# Patient Record
Sex: FEMALE | Race: WHITE | NOT HISPANIC OR LATINO | Employment: UNEMPLOYED | URBAN - METROPOLITAN AREA
[De-identification: names, ages, dates, MRNs, and addresses within clinical notes are randomized per-mention and may not be internally consistent; named-entity substitution may affect disease eponyms.]

---

## 2017-01-03 ENCOUNTER — TRANSCRIBE ORDERS (OUTPATIENT)
Dept: RADIOLOGY | Facility: HOSPITAL | Age: 82
End: 2017-01-03

## 2017-01-03 ENCOUNTER — HOSPITAL ENCOUNTER (OUTPATIENT)
Dept: RADIOLOGY | Facility: HOSPITAL | Age: 82
Discharge: HOME | End: 2017-01-03
Attending: INTERNAL MEDICINE
Payer: MEDICARE

## 2017-01-03 ENCOUNTER — GENERIC CONVERSION - ENCOUNTER (OUTPATIENT)
Dept: OTHER | Facility: OTHER | Age: 82
End: 2017-01-03

## 2017-01-03 DIAGNOSIS — M25.572 LEFT ANKLE PAIN, UNSPECIFIED CHRONICITY: ICD-10-CM

## 2017-01-03 DIAGNOSIS — M25.572 LEFT ANKLE PAIN, UNSPECIFIED CHRONICITY: Primary | ICD-10-CM

## 2017-01-03 PROCEDURE — 73590 X-RAY EXAM OF LOWER LEG: CPT

## 2017-01-03 PROCEDURE — 73610 X-RAY EXAM OF ANKLE: CPT

## 2017-01-05 ENCOUNTER — ALLSCRIPTS OFFICE VISIT (OUTPATIENT)
Dept: OTHER | Facility: OTHER | Age: 82
End: 2017-01-05

## 2017-01-10 ENCOUNTER — APPOINTMENT (OUTPATIENT)
Dept: SPEECH THERAPY | Facility: CLINIC | Age: 82
End: 2017-01-10
Payer: MEDICARE

## 2017-01-10 PROCEDURE — G9171 VOICE CURRENT STATUS: HCPCS

## 2017-01-10 PROCEDURE — G9172 VOICE GOAL STATUS: HCPCS

## 2017-01-10 PROCEDURE — 92524 BEHAVRAL QUALIT ANALYS VOICE: CPT

## 2017-01-11 ENCOUNTER — GENERIC CONVERSION - ENCOUNTER (OUTPATIENT)
Dept: OTHER | Facility: OTHER | Age: 82
End: 2017-01-11

## 2017-01-16 ENCOUNTER — APPOINTMENT (OUTPATIENT)
Dept: SPEECH THERAPY | Facility: CLINIC | Age: 82
End: 2017-01-16
Payer: MEDICARE

## 2017-01-16 PROCEDURE — 92507 TX SP LANG VOICE COMM INDIV: CPT

## 2017-01-17 ENCOUNTER — APPOINTMENT (OUTPATIENT)
Dept: SPEECH THERAPY | Facility: CLINIC | Age: 82
End: 2017-01-17
Payer: MEDICARE

## 2017-01-17 PROCEDURE — 92507 TX SP LANG VOICE COMM INDIV: CPT

## 2017-01-18 ENCOUNTER — APPOINTMENT (OUTPATIENT)
Dept: SPEECH THERAPY | Facility: CLINIC | Age: 82
End: 2017-01-18
Payer: MEDICARE

## 2017-01-18 PROCEDURE — 92507 TX SP LANG VOICE COMM INDIV: CPT

## 2017-01-19 ENCOUNTER — APPOINTMENT (OUTPATIENT)
Dept: SPEECH THERAPY | Facility: CLINIC | Age: 82
End: 2017-01-19
Payer: MEDICARE

## 2017-01-19 PROCEDURE — 92507 TX SP LANG VOICE COMM INDIV: CPT

## 2017-01-23 ENCOUNTER — APPOINTMENT (OUTPATIENT)
Dept: SPEECH THERAPY | Facility: CLINIC | Age: 82
End: 2017-01-23
Payer: MEDICARE

## 2017-01-24 ENCOUNTER — APPOINTMENT (OUTPATIENT)
Dept: SPEECH THERAPY | Facility: CLINIC | Age: 82
End: 2017-01-24
Payer: MEDICARE

## 2017-01-24 PROCEDURE — 92507 TX SP LANG VOICE COMM INDIV: CPT

## 2017-01-25 ENCOUNTER — APPOINTMENT (OUTPATIENT)
Dept: SPEECH THERAPY | Facility: CLINIC | Age: 82
End: 2017-01-25
Payer: MEDICARE

## 2017-01-25 PROCEDURE — 92507 TX SP LANG VOICE COMM INDIV: CPT

## 2017-01-26 ENCOUNTER — APPOINTMENT (OUTPATIENT)
Dept: SPEECH THERAPY | Facility: CLINIC | Age: 82
End: 2017-01-26
Payer: MEDICARE

## 2017-01-26 PROCEDURE — 92507 TX SP LANG VOICE COMM INDIV: CPT

## 2017-01-27 ENCOUNTER — APPOINTMENT (OUTPATIENT)
Dept: SPEECH THERAPY | Facility: CLINIC | Age: 82
End: 2017-01-27
Payer: MEDICARE

## 2017-01-30 ENCOUNTER — APPOINTMENT (OUTPATIENT)
Dept: SPEECH THERAPY | Facility: CLINIC | Age: 82
End: 2017-01-30
Payer: MEDICARE

## 2017-01-30 PROCEDURE — 92507 TX SP LANG VOICE COMM INDIV: CPT

## 2017-01-31 ENCOUNTER — APPOINTMENT (OUTPATIENT)
Dept: SPEECH THERAPY | Facility: CLINIC | Age: 82
End: 2017-01-31
Payer: MEDICARE

## 2017-01-31 ENCOUNTER — GENERIC CONVERSION - ENCOUNTER (OUTPATIENT)
Dept: OTHER | Facility: OTHER | Age: 82
End: 2017-01-31

## 2017-01-31 PROCEDURE — G9172 VOICE GOAL STATUS: HCPCS

## 2017-01-31 PROCEDURE — 92507 TX SP LANG VOICE COMM INDIV: CPT

## 2017-01-31 PROCEDURE — G9171 VOICE CURRENT STATUS: HCPCS

## 2017-02-01 ENCOUNTER — APPOINTMENT (OUTPATIENT)
Dept: SPEECH THERAPY | Facility: CLINIC | Age: 82
End: 2017-02-01
Payer: MEDICARE

## 2017-02-01 PROCEDURE — 92507 TX SP LANG VOICE COMM INDIV: CPT

## 2017-02-02 ENCOUNTER — APPOINTMENT (OUTPATIENT)
Dept: SPEECH THERAPY | Facility: CLINIC | Age: 82
End: 2017-02-02
Payer: MEDICARE

## 2017-02-02 ENCOUNTER — GENERIC CONVERSION - ENCOUNTER (OUTPATIENT)
Dept: OTHER | Facility: OTHER | Age: 82
End: 2017-02-02

## 2017-02-02 PROCEDURE — 92507 TX SP LANG VOICE COMM INDIV: CPT

## 2017-02-06 ENCOUNTER — APPOINTMENT (OUTPATIENT)
Dept: SPEECH THERAPY | Facility: CLINIC | Age: 82
End: 2017-02-06
Payer: MEDICARE

## 2017-02-06 ENCOUNTER — GENERIC CONVERSION - ENCOUNTER (OUTPATIENT)
Dept: OTHER | Facility: OTHER | Age: 82
End: 2017-02-06

## 2017-02-06 PROCEDURE — 92507 TX SP LANG VOICE COMM INDIV: CPT

## 2017-02-07 ENCOUNTER — APPOINTMENT (OUTPATIENT)
Dept: SPEECH THERAPY | Facility: CLINIC | Age: 82
End: 2017-02-07
Payer: MEDICARE

## 2017-02-07 PROCEDURE — 92507 TX SP LANG VOICE COMM INDIV: CPT

## 2017-02-08 ENCOUNTER — APPOINTMENT (OUTPATIENT)
Dept: SPEECH THERAPY | Facility: CLINIC | Age: 82
End: 2017-02-08
Payer: MEDICARE

## 2017-02-08 PROCEDURE — 92507 TX SP LANG VOICE COMM INDIV: CPT

## 2017-02-09 ENCOUNTER — APPOINTMENT (OUTPATIENT)
Dept: SPEECH THERAPY | Facility: CLINIC | Age: 82
End: 2017-02-09
Payer: MEDICARE

## 2017-02-11 ENCOUNTER — OFFICE VISIT (OUTPATIENT)
Dept: URGENT CARE | Facility: MEDICAL CENTER | Age: 82
End: 2017-02-11
Payer: MEDICARE

## 2017-02-11 ENCOUNTER — APPOINTMENT (OUTPATIENT)
Dept: LAB | Facility: HOSPITAL | Age: 82
End: 2017-02-11
Attending: FAMILY MEDICINE
Payer: MEDICARE

## 2017-02-11 DIAGNOSIS — G20 PARKINSON'S DISEASE (HCC): ICD-10-CM

## 2017-02-11 DIAGNOSIS — J06.9 ACUTE UPPER RESPIRATORY INFECTION: ICD-10-CM

## 2017-02-11 PROCEDURE — 87798 DETECT AGENT NOS DNA AMP: CPT

## 2017-02-11 PROCEDURE — 99204 OFFICE O/P NEW MOD 45 MIN: CPT

## 2017-02-11 PROCEDURE — G0463 HOSPITAL OUTPT CLINIC VISIT: HCPCS

## 2017-02-13 ENCOUNTER — APPOINTMENT (OUTPATIENT)
Dept: SPEECH THERAPY | Facility: CLINIC | Age: 82
End: 2017-02-13
Payer: MEDICARE

## 2017-02-13 LAB
FLUAV AG SPEC QL: NORMAL
FLUBV AG SPEC QL: NORMAL
RSV B RNA SPEC QL NAA+PROBE: NORMAL

## 2017-02-16 ENCOUNTER — APPOINTMENT (OUTPATIENT)
Dept: SPEECH THERAPY | Facility: CLINIC | Age: 82
End: 2017-02-16
Payer: MEDICARE

## 2017-02-22 ENCOUNTER — APPOINTMENT (INPATIENT)
Dept: RADIOLOGY | Facility: HOSPITAL | Age: 82
DRG: 480 | End: 2017-02-22
Payer: MEDICARE

## 2017-02-22 ENCOUNTER — APPOINTMENT (EMERGENCY)
Dept: RADIOLOGY | Facility: HOSPITAL | Age: 82
DRG: 480 | End: 2017-02-22
Payer: MEDICARE

## 2017-02-22 ENCOUNTER — HOSPITAL ENCOUNTER (INPATIENT)
Facility: HOSPITAL | Age: 82
LOS: 12 days | Discharge: NURSING, REHAB OR LONG-TERM CARE | DRG: 480 | End: 2017-03-06
Attending: EMERGENCY MEDICINE | Admitting: ORTHOPAEDIC SURGERY
Payer: MEDICARE

## 2017-02-22 DIAGNOSIS — E16.2 HYPOGLYCEMIA: ICD-10-CM

## 2017-02-22 DIAGNOSIS — F41.8 DEPRESSION WITH ANXIETY: ICD-10-CM

## 2017-02-22 DIAGNOSIS — M51.36 DDD (DEGENERATIVE DISC DISEASE), LUMBAR: ICD-10-CM

## 2017-02-22 DIAGNOSIS — R41.82 ALTERED MENTAL STATUS, UNSPECIFIED ALTERED MENTAL STATUS TYPE: ICD-10-CM

## 2017-02-22 DIAGNOSIS — S72.141A CLOSED INTERTROCHANTERIC FRACTURE OF RIGHT FEMUR, INITIAL ENCOUNTER (HCC): Primary | ICD-10-CM
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
## 2017-02-22 DIAGNOSIS — S72.91XA FEMUR FRACTURE, RIGHT (HCC): ICD-10-CM

## 2017-02-22 DIAGNOSIS — G20 PARKINSON DISEASE (HCC): ICD-10-CM

## 2017-02-22 LAB
ABO GROUP BLD: NORMAL
ANION GAP SERPL CALCULATED.3IONS-SCNC: 3 MMOL/L (ref 4–13)
APTT PPP: 28 SECONDS (ref 24–36)
ATRIAL RATE: 51 BPM
BASOPHILS # BLD AUTO: 0.01 THOUSANDS/ΜL (ref 0–0.1)
BASOPHILS NFR BLD AUTO: 0 % (ref 0–1)
BLD GP AB SCN SERPL QL: NEGATIVE
BUN SERPL-MCNC: 29 MG/DL (ref 5–25)
CALCIUM SERPL-MCNC: 9.1 MG/DL (ref 8.3–10.1)
CHLORIDE SERPL-SCNC: 105 MMOL/L (ref 100–108)
CO2 SERPL-SCNC: 32 MMOL/L (ref 21–32)
CREAT SERPL-MCNC: 0.74 MG/DL (ref 0.6–1.3)
EOSINOPHIL # BLD AUTO: 0.06 THOUSAND/ΜL (ref 0–0.61)
EOSINOPHIL NFR BLD AUTO: 1 % (ref 0–6)
ERYTHROCYTE [DISTWIDTH] IN BLOOD BY AUTOMATED COUNT: 14.4 % (ref 11.6–15.1)
GFR SERPL CREATININE-BSD FRML MDRD: >60 ML/MIN/1.73SQ M
GLUCOSE SERPL-MCNC: 129 MG/DL (ref 65–140)
HCT VFR BLD AUTO: 39.6 % (ref 34.8–46.1)
HGB BLD-MCNC: 13.3 G/DL (ref 11.5–15.4)
INR PPP: 0.98 (ref 0.86–1.16)
LYMPHOCYTES # BLD AUTO: 1.13 THOUSANDS/ΜL (ref 0.6–4.47)
LYMPHOCYTES NFR BLD AUTO: 17 % (ref 14–44)
MCH RBC QN AUTO: 31.6 PG (ref 26.8–34.3)
MCHC RBC AUTO-ENTMCNC: 33.6 G/DL (ref 31.4–37.4)
MCV RBC AUTO: 94 FL (ref 82–98)
MONOCYTES # BLD AUTO: 0.24 THOUSAND/ΜL (ref 0.17–1.22)
MONOCYTES NFR BLD AUTO: 4 % (ref 4–12)
NEUTROPHILS # BLD AUTO: 5.23 THOUSANDS/ΜL (ref 1.85–7.62)
NEUTS SEG NFR BLD AUTO: 78 % (ref 43–75)
NRBC BLD AUTO-RTO: 0 /100 WBCS
P AXIS: 60 DEGREES
PLATELET # BLD AUTO: 163 THOUSANDS/UL (ref 149–390)
PMV BLD AUTO: 10.9 FL (ref 8.9–12.7)
POTASSIUM SERPL-SCNC: 4.1 MMOL/L (ref 3.5–5.3)
PR INTERVAL: 138 MS
PROTHROMBIN TIME: 13.1 SECONDS (ref 12–14.3)
QRS AXIS: 2 DEGREES
QRSD INTERVAL: 158 MS
QT INTERVAL: 486 MS
QTC INTERVAL: 447 MS
RBC # BLD AUTO: 4.21 MILLION/UL (ref 3.81–5.12)
RH BLD: POSITIVE
SODIUM SERPL-SCNC: 140 MMOL/L (ref 136–145)
T WAVE AXIS: 48 DEGREES
VENTRICULAR RATE: 51 BPM
WBC # BLD AUTO: 6.69 THOUSAND/UL (ref 4.31–10.16)

## 2017-02-22 PROCEDURE — 93005 ELECTROCARDIOGRAM TRACING: CPT | Performed by: ORTHOPAEDIC SURGERY

## 2017-02-22 PROCEDURE — 86901 BLOOD TYPING SEROLOGIC RH(D): CPT | Performed by: EMERGENCY MEDICINE

## 2017-02-22 PROCEDURE — 73502 X-RAY EXAM HIP UNI 2-3 VIEWS: CPT

## 2017-02-22 PROCEDURE — 36415 COLL VENOUS BLD VENIPUNCTURE: CPT | Performed by: EMERGENCY MEDICINE

## 2017-02-22 PROCEDURE — 80048 BASIC METABOLIC PNL TOTAL CA: CPT | Performed by: EMERGENCY MEDICINE

## 2017-02-22 PROCEDURE — 86850 RBC ANTIBODY SCREEN: CPT | Performed by: EMERGENCY MEDICINE

## 2017-02-22 PROCEDURE — 93005 ELECTROCARDIOGRAM TRACING: CPT | Performed by: EMERGENCY MEDICINE

## 2017-02-22 PROCEDURE — 73501 X-RAY EXAM HIP UNI 1 VIEW: CPT

## 2017-02-22 PROCEDURE — 85610 PROTHROMBIN TIME: CPT | Performed by: EMERGENCY MEDICINE

## 2017-02-22 PROCEDURE — 73552 X-RAY EXAM OF FEMUR 2/>: CPT

## 2017-02-22 PROCEDURE — 71010 HB CHEST X-RAY 1 VIEW FRONTAL (PORTABLE): CPT

## 2017-02-22 PROCEDURE — 85025 COMPLETE CBC W/AUTO DIFF WBC: CPT | Performed by: EMERGENCY MEDICINE

## 2017-02-22 PROCEDURE — 86900 BLOOD TYPING SEROLOGIC ABO: CPT | Performed by: EMERGENCY MEDICINE

## 2017-02-22 PROCEDURE — 85730 THROMBOPLASTIN TIME PARTIAL: CPT | Performed by: EMERGENCY MEDICINE

## 2017-02-22 RX ORDER — SODIUM CHLORIDE, SODIUM LACTATE, POTASSIUM CHLORIDE, CALCIUM CHLORIDE 600; 310; 30; 20 MG/100ML; MG/100ML; MG/100ML; MG/100ML
75 INJECTION, SOLUTION INTRAVENOUS CONTINUOUS
Status: DISCONTINUED | OUTPATIENT
Start: 2017-02-22 | End: 2017-02-23

## 2017-02-22 RX ORDER — MIRTAZAPINE 15 MG/1
7.5 TABLET, FILM COATED ORAL
Status: DISCONTINUED | OUTPATIENT
Start: 2017-02-23 | End: 2017-03-06

## 2017-02-22 RX ORDER — ENTACAPONE 200 MG/1
200 TABLET ORAL
Status: DISCONTINUED | OUTPATIENT
Start: 2017-02-23 | End: 2017-03-06 | Stop reason: HOSPADM

## 2017-02-22 RX ORDER — OXYCODONE HYDROCHLORIDE 5 MG/1
5 TABLET ORAL EVERY 4 HOURS PRN
Status: DISCONTINUED | OUTPATIENT
Start: 2017-02-22 | End: 2017-02-25

## 2017-02-22 RX ORDER — OXYCODONE HYDROCHLORIDE 10 MG/1
10 TABLET ORAL EVERY 4 HOURS PRN
Status: DISCONTINUED | OUTPATIENT
Start: 2017-02-22 | End: 2017-02-25

## 2017-02-22 RX ORDER — B-COMPLEX WITH VITAMIN C
1 TABLET ORAL DAILY
Status: DISCONTINUED | OUTPATIENT
Start: 2017-02-23 | End: 2017-03-06 | Stop reason: HOSPADM

## 2017-02-22 RX ORDER — MORPHINE SULFATE 2 MG/ML
1 INJECTION, SOLUTION INTRAMUSCULAR; INTRAVENOUS EVERY 4 HOURS PRN
Status: DISCONTINUED | OUTPATIENT
Start: 2017-02-22 | End: 2017-03-01

## 2017-02-22 RX ORDER — DOCUSATE SODIUM 100 MG/1
100 CAPSULE, LIQUID FILLED ORAL 2 TIMES DAILY
Status: DISCONTINUED | OUTPATIENT
Start: 2017-02-22 | End: 2017-03-06 | Stop reason: HOSPADM

## 2017-02-22 RX ORDER — MORPHINE SULFATE 2 MG/ML
INJECTION, SOLUTION INTRAMUSCULAR; INTRAVENOUS
Status: DISPENSED
Start: 2017-02-22 | End: 2017-02-23

## 2017-02-22 RX ORDER — ONDANSETRON 2 MG/ML
4 INJECTION INTRAMUSCULAR; INTRAVENOUS EVERY 6 HOURS PRN
Status: DISCONTINUED | OUTPATIENT
Start: 2017-02-22 | End: 2017-03-06 | Stop reason: HOSPADM

## 2017-02-22 RX ORDER — GLUCOSAMINE SULFATE 500 MG
1000 CAPSULE ORAL DAILY
COMMUNITY

## 2017-02-22 RX ORDER — SODIUM PHOSPHATE,MONO-DIBASIC 19G-7G/118
1000 ENEMA (ML) RECTAL DAILY
Status: DISCONTINUED | OUTPATIENT
Start: 2017-02-23 | End: 2017-03-06 | Stop reason: HOSPADM

## 2017-02-22 RX ORDER — TOLTERODINE 4 MG/1
4 CAPSULE, EXTENDED RELEASE ORAL DAILY
Status: DISCONTINUED | OUTPATIENT
Start: 2017-02-23 | End: 2017-03-06 | Stop reason: HOSPADM

## 2017-02-22 RX ORDER — CHOLECALCIFEROL (VITAMIN D3) 125 MCG
1000 CAPSULE ORAL DAILY
Status: DISCONTINUED | OUTPATIENT
Start: 2017-02-23 | End: 2017-03-06 | Stop reason: HOSPADM

## 2017-02-22 RX ORDER — SENNOSIDES 8.6 MG
1 TABLET ORAL DAILY
Status: DISCONTINUED | OUTPATIENT
Start: 2017-02-23 | End: 2017-03-06 | Stop reason: HOSPADM

## 2017-02-22 RX ORDER — SODIUM CHLORIDE 9 MG/ML
75 INJECTION, SOLUTION INTRAVENOUS CONTINUOUS
Status: DISCONTINUED | OUTPATIENT
Start: 2017-02-22 | End: 2017-02-23

## 2017-02-22 RX ORDER — CARBIDOPA AND LEVODOPA 50; 200 MG/1; MG/1
1 TABLET, EXTENDED RELEASE ORAL
Status: DISCONTINUED | OUTPATIENT
Start: 2017-02-23 | End: 2017-02-25

## 2017-02-22 RX ADMIN — SODIUM CHLORIDE, SODIUM LACTATE, POTASSIUM CHLORIDE, AND CALCIUM CHLORIDE 75 ML/HR: .6; .31; .03; .02 INJECTION, SOLUTION INTRAVENOUS at 21:58

## 2017-02-22 RX ADMIN — MORPHINE SULFATE 1 MG: 2 INJECTION, SOLUTION INTRAMUSCULAR; INTRAVENOUS at 22:34

## 2017-02-23 ENCOUNTER — APPOINTMENT (INPATIENT)
Dept: RADIOLOGY | Facility: HOSPITAL | Age: 82
DRG: 480 | End: 2017-02-23
Payer: MEDICARE

## 2017-02-23 ENCOUNTER — ANESTHESIA (INPATIENT)
Dept: PERIOP | Facility: HOSPITAL | Age: 82
DRG: 480 | End: 2017-02-23
Payer: MEDICARE

## 2017-02-23 ENCOUNTER — ANESTHESIA EVENT (INPATIENT)
Dept: PERIOP | Facility: HOSPITAL | Age: 82
DRG: 480 | End: 2017-02-23
Payer: MEDICARE

## 2017-02-23 LAB
ALBUMIN SERPL BCP-MCNC: 3.4 G/DL (ref 3.5–5)
ALP SERPL-CCNC: 75 U/L (ref 46–116)
ALT SERPL W P-5'-P-CCNC: 9 U/L (ref 12–78)
ANION GAP SERPL CALCULATED.3IONS-SCNC: 4 MMOL/L (ref 4–13)
APTT PPP: 25 SECONDS (ref 24–36)
AST SERPL W P-5'-P-CCNC: 21 U/L (ref 5–45)
BILIRUB SERPL-MCNC: 0.58 MG/DL (ref 0.2–1)
BUN SERPL-MCNC: 25 MG/DL (ref 5–25)
CALCIUM SERPL-MCNC: 8.7 MG/DL (ref 8.3–10.1)
CHLORIDE SERPL-SCNC: 105 MMOL/L (ref 100–108)
CO2 SERPL-SCNC: 30 MMOL/L (ref 21–32)
CREAT SERPL-MCNC: 0.66 MG/DL (ref 0.6–1.3)
ERYTHROCYTE [DISTWIDTH] IN BLOOD BY AUTOMATED COUNT: 14.1 % (ref 11.6–15.1)
GFR SERPL CREATININE-BSD FRML MDRD: >60 ML/MIN/1.73SQ M
GLUCOSE SERPL-MCNC: 107 MG/DL (ref 65–140)
HCT VFR BLD AUTO: 34.7 % (ref 34.8–46.1)
HGB BLD-MCNC: 11.6 G/DL (ref 11.5–15.4)
INR PPP: 1.05 (ref 0.86–1.16)
MCH RBC QN AUTO: 31.3 PG (ref 26.8–34.3)
MCHC RBC AUTO-ENTMCNC: 33.4 G/DL (ref 31.4–37.4)
MCV RBC AUTO: 94 FL (ref 82–98)
PLATELET # BLD AUTO: 163 THOUSANDS/UL (ref 149–390)
PMV BLD AUTO: 11 FL (ref 8.9–12.7)
POTASSIUM SERPL-SCNC: 3.8 MMOL/L (ref 3.5–5.3)
PROT SERPL-MCNC: 6.4 G/DL (ref 6.4–8.2)
PROTHROMBIN TIME: 13.8 SECONDS (ref 12–14.3)
RBC # BLD AUTO: 3.71 MILLION/UL (ref 3.81–5.12)
SODIUM SERPL-SCNC: 139 MMOL/L (ref 136–145)
WBC # BLD AUTO: 8.26 THOUSAND/UL (ref 4.31–10.16)

## 2017-02-23 PROCEDURE — 85610 PROTHROMBIN TIME: CPT | Performed by: ORTHOPAEDIC SURGERY

## 2017-02-23 PROCEDURE — 99285 EMERGENCY DEPT VISIT HI MDM: CPT

## 2017-02-23 PROCEDURE — C1769 GUIDE WIRE: HCPCS | Performed by: ORTHOPAEDIC SURGERY

## 2017-02-23 PROCEDURE — C1713 ANCHOR/SCREW BN/BN,TIS/BN: HCPCS | Performed by: ORTHOPAEDIC SURGERY

## 2017-02-23 PROCEDURE — 36415 COLL VENOUS BLD VENIPUNCTURE: CPT | Performed by: ORTHOPAEDIC SURGERY

## 2017-02-23 PROCEDURE — 85730 THROMBOPLASTIN TIME PARTIAL: CPT | Performed by: ORTHOPAEDIC SURGERY

## 2017-02-23 PROCEDURE — 73502 X-RAY EXAM HIP UNI 2-3 VIEWS: CPT

## 2017-02-23 PROCEDURE — 0QH606Z INSERTION OF INTRAMEDULLARY INTERNAL FIXATION DEVICE INTO RIGHT UPPER FEMUR, OPEN APPROACH: ICD-10-PCS | Performed by: ORTHOPAEDIC SURGERY

## 2017-02-23 PROCEDURE — 80053 COMPREHEN METABOLIC PANEL: CPT | Performed by: ORTHOPAEDIC SURGERY

## 2017-02-23 PROCEDURE — 85027 COMPLETE CBC AUTOMATED: CPT | Performed by: ORTHOPAEDIC SURGERY

## 2017-02-23 DEVICE — 10MM/130 DEG TI CANN TROCH FIXATION NAIL 170MM-STERILE: Type: IMPLANTABLE DEVICE | Site: HIP | Status: FUNCTIONAL

## 2017-02-23 DEVICE — 11.0MM TI HELICAL BLADE 100MM-STERILE: Type: IMPLANTABLE DEVICE | Site: HIP | Status: FUNCTIONAL

## 2017-02-23 RX ORDER — ONDANSETRON 2 MG/ML
4 INJECTION INTRAMUSCULAR; INTRAVENOUS EVERY 4 HOURS PRN
Status: DISCONTINUED | OUTPATIENT
Start: 2017-02-23 | End: 2017-02-23 | Stop reason: HOSPADM

## 2017-02-23 RX ORDER — CLINDAMYCIN PHOSPHATE 900 MG/50ML
900 INJECTION INTRAVENOUS ONCE
Status: DISCONTINUED | OUTPATIENT
Start: 2017-02-23 | End: 2017-02-23 | Stop reason: HOSPADM

## 2017-02-23 RX ORDER — FENTANYL CITRATE 50 UG/ML
INJECTION, SOLUTION INTRAMUSCULAR; INTRAVENOUS AS NEEDED
Status: DISCONTINUED | OUTPATIENT
Start: 2017-02-23 | End: 2017-02-23 | Stop reason: SURG

## 2017-02-23 RX ORDER — SODIUM CHLORIDE, SODIUM LACTATE, POTASSIUM CHLORIDE, CALCIUM CHLORIDE 600; 310; 30; 20 MG/100ML; MG/100ML; MG/100ML; MG/100ML
100 INJECTION, SOLUTION INTRAVENOUS CONTINUOUS
Status: DISCONTINUED | OUTPATIENT
Start: 2017-02-23 | End: 2017-02-23

## 2017-02-23 RX ORDER — SODIUM CHLORIDE 9 MG/ML
75 INJECTION, SOLUTION INTRAVENOUS CONTINUOUS
Status: DISCONTINUED | OUTPATIENT
Start: 2017-02-23 | End: 2017-03-02

## 2017-02-23 RX ORDER — ONDANSETRON 2 MG/ML
INJECTION INTRAMUSCULAR; INTRAVENOUS AS NEEDED
Status: DISCONTINUED | OUTPATIENT
Start: 2017-02-23 | End: 2017-02-23 | Stop reason: SURG

## 2017-02-23 RX ORDER — MAGNESIUM HYDROXIDE 1200 MG/15ML
LIQUID ORAL AS NEEDED
Status: DISCONTINUED | OUTPATIENT
Start: 2017-02-23 | End: 2017-02-23 | Stop reason: HOSPADM

## 2017-02-23 RX ORDER — PROPOFOL 10 MG/ML
INJECTION, EMULSION INTRAVENOUS CONTINUOUS PRN
Status: DISCONTINUED | OUTPATIENT
Start: 2017-02-23 | End: 2017-02-23 | Stop reason: SURG

## 2017-02-23 RX ORDER — CLINDAMYCIN PHOSPHATE 150 MG/ML
INJECTION, SOLUTION INTRAVENOUS AS NEEDED
Status: DISCONTINUED | OUTPATIENT
Start: 2017-02-23 | End: 2017-02-23 | Stop reason: SURG

## 2017-02-23 RX ORDER — EPHEDRINE SULFATE 50 MG/ML
INJECTION, SOLUTION INTRAVENOUS AS NEEDED
Status: DISCONTINUED | OUTPATIENT
Start: 2017-02-23 | End: 2017-02-23 | Stop reason: SURG

## 2017-02-23 RX ORDER — CLINDAMYCIN PHOSPHATE 600 MG/50ML
600 INJECTION INTRAVENOUS EVERY 8 HOURS
Status: COMPLETED | OUTPATIENT
Start: 2017-02-23 | End: 2017-02-24

## 2017-02-23 RX ORDER — PROPOFOL 10 MG/ML
INJECTION, EMULSION INTRAVENOUS AS NEEDED
Status: DISCONTINUED | OUTPATIENT
Start: 2017-02-23 | End: 2017-02-23 | Stop reason: SURG

## 2017-02-23 RX ADMIN — PROPOFOL 100 MCG/KG/MIN: 10 INJECTION, EMULSION INTRAVENOUS at 12:48

## 2017-02-23 RX ADMIN — DEXAMETHASONE SODIUM PHOSPHATE 10 MG: 10 INJECTION INTRAMUSCULAR; INTRAVENOUS at 12:49

## 2017-02-23 RX ADMIN — CARBIDOPA AND LEVODOPA 1 TABLET: 50; 200 TABLET, EXTENDED RELEASE ORAL at 19:26

## 2017-02-23 RX ADMIN — OXYCODONE HYDROCHLORIDE 10 MG: 10 TABLET ORAL at 21:30

## 2017-02-23 RX ADMIN — TOLTERODINE TARTRATE 4 MG: 4 CAPSULE, EXTENDED RELEASE ORAL at 22:09

## 2017-02-23 RX ADMIN — FENTANYL CITRATE 25 MCG: 50 INJECTION, SOLUTION INTRAMUSCULAR; INTRAVENOUS at 13:08

## 2017-02-23 RX ADMIN — SODIUM CHLORIDE 75 ML/HR: 0.9 INJECTION, SOLUTION INTRAVENOUS at 15:00

## 2017-02-23 RX ADMIN — PROPOFOL 100 MG: 10 INJECTION, EMULSION INTRAVENOUS at 12:47

## 2017-02-23 RX ADMIN — CLINDAMYCIN PHOSPHATE 900 MG: 150 INJECTION, SOLUTION INTRAMUSCULAR; INTRAVENOUS at 12:55

## 2017-02-23 RX ADMIN — EPHEDRINE SULFATE 15 MG: 50 INJECTION, SOLUTION INTRAMUSCULAR; INTRAVENOUS; SUBCUTANEOUS at 12:50

## 2017-02-23 RX ADMIN — DOCUSATE SODIUM 100 MG: 100 CAPSULE, LIQUID FILLED ORAL at 01:54

## 2017-02-23 RX ADMIN — CARBIDOPA AND LEVODOPA 1 TABLET: 50; 200 TABLET, EXTENDED RELEASE ORAL at 22:10

## 2017-02-23 RX ADMIN — ONDANSETRON 4 MG: 2 INJECTION INTRAMUSCULAR; INTRAVENOUS at 13:35

## 2017-02-23 RX ADMIN — ENTACAPONE 200 MG: 200 TABLET, FILM COATED ORAL at 09:21

## 2017-02-23 RX ADMIN — CARBIDOPA AND LEVODOPA 1 TABLET: 50; 200 TABLET, EXTENDED RELEASE ORAL at 09:21

## 2017-02-23 RX ADMIN — SODIUM CHLORIDE, SODIUM LACTATE, POTASSIUM CHLORIDE, AND CALCIUM CHLORIDE: .6; .31; .03; .02 INJECTION, SOLUTION INTRAVENOUS at 12:39

## 2017-02-23 RX ADMIN — EPHEDRINE SULFATE 5 MG: 50 INJECTION, SOLUTION INTRAMUSCULAR; INTRAVENOUS; SUBCUTANEOUS at 13:43

## 2017-02-23 RX ADMIN — OXYCODONE HYDROCHLORIDE 10 MG: 10 TABLET ORAL at 01:54

## 2017-02-23 RX ADMIN — EPHEDRINE SULFATE 5 MG: 50 INJECTION, SOLUTION INTRAMUSCULAR; INTRAVENOUS; SUBCUTANEOUS at 13:05

## 2017-02-23 RX ADMIN — ENTACAPONE 200 MG: 200 TABLET, FILM COATED ORAL at 22:09

## 2017-02-23 RX ADMIN — MIRTAZAPINE 7.5 MG: 15 TABLET, FILM COATED ORAL at 22:09

## 2017-02-23 RX ADMIN — OXYCODONE HYDROCHLORIDE 10 MG: 10 TABLET ORAL at 16:42

## 2017-02-23 RX ADMIN — DOCUSATE SODIUM 100 MG: 100 CAPSULE, LIQUID FILLED ORAL at 19:25

## 2017-02-23 RX ADMIN — CALCIUM CARBONATE 500 MG (1,250 MG)-VITAMIN D3 200 UNIT TABLET 1 TABLET: at 19:26

## 2017-02-23 RX ADMIN — ENTACAPONE 200 MG: 200 TABLET, FILM COATED ORAL at 19:25

## 2017-02-23 RX ADMIN — CLINDAMYCIN PHOSPHATE 600 MG: 12 INJECTION, SOLUTION INTRAMUSCULAR; INTRAVENOUS at 22:08

## 2017-02-23 RX ADMIN — EPHEDRINE SULFATE 10 MG: 50 INJECTION, SOLUTION INTRAMUSCULAR; INTRAVENOUS; SUBCUTANEOUS at 13:20

## 2017-02-24 LAB
ANION GAP SERPL CALCULATED.3IONS-SCNC: 6 MMOL/L (ref 4–13)
BUN SERPL-MCNC: 29 MG/DL (ref 5–25)
CALCIUM SERPL-MCNC: 8.7 MG/DL (ref 8.3–10.1)
CHLORIDE SERPL-SCNC: 104 MMOL/L (ref 100–108)
CO2 SERPL-SCNC: 27 MMOL/L (ref 21–32)
CREAT SERPL-MCNC: 0.86 MG/DL (ref 0.6–1.3)
ERYTHROCYTE [DISTWIDTH] IN BLOOD BY AUTOMATED COUNT: 14.5 % (ref 11.6–15.1)
GFR SERPL CREATININE-BSD FRML MDRD: >60 ML/MIN/1.73SQ M
GLUCOSE SERPL-MCNC: 119 MG/DL (ref 65–140)
HCT VFR BLD AUTO: 27.5 % (ref 34.8–46.1)
HGB BLD-MCNC: 9.5 G/DL (ref 11.5–15.4)
MCH RBC QN AUTO: 32.1 PG (ref 26.8–34.3)
MCHC RBC AUTO-ENTMCNC: 34.5 G/DL (ref 31.4–37.4)
MCV RBC AUTO: 93 FL (ref 82–98)
PLATELET # BLD AUTO: 132 THOUSANDS/UL (ref 149–390)
PMV BLD AUTO: 10.8 FL (ref 8.9–12.7)
POTASSIUM SERPL-SCNC: 4.3 MMOL/L (ref 3.5–5.3)
RBC # BLD AUTO: 2.96 MILLION/UL (ref 3.81–5.12)
SODIUM SERPL-SCNC: 137 MMOL/L (ref 136–145)
WBC # BLD AUTO: 7.11 THOUSAND/UL (ref 4.31–10.16)

## 2017-02-24 PROCEDURE — 85027 COMPLETE CBC AUTOMATED: CPT | Performed by: ORTHOPAEDIC SURGERY

## 2017-02-24 PROCEDURE — 97167 OT EVAL HIGH COMPLEX 60 MIN: CPT

## 2017-02-24 PROCEDURE — G8979 MOBILITY GOAL STATUS: HCPCS

## 2017-02-24 PROCEDURE — 80048 BASIC METABOLIC PNL TOTAL CA: CPT | Performed by: ORTHOPAEDIC SURGERY

## 2017-02-24 PROCEDURE — G8987 SELF CARE CURRENT STATUS: HCPCS

## 2017-02-24 PROCEDURE — 97163 PT EVAL HIGH COMPLEX 45 MIN: CPT

## 2017-02-24 PROCEDURE — G8988 SELF CARE GOAL STATUS: HCPCS

## 2017-02-24 PROCEDURE — G8978 MOBILITY CURRENT STATUS: HCPCS

## 2017-02-24 RX ADMIN — CARBIDOPA AND LEVODOPA 1 TABLET: 50; 200 TABLET, EXTENDED RELEASE ORAL at 18:35

## 2017-02-24 RX ADMIN — ENOXAPARIN SODIUM 40 MG: 40 INJECTION SUBCUTANEOUS at 08:26

## 2017-02-24 RX ADMIN — ENTACAPONE 200 MG: 200 TABLET, FILM COATED ORAL at 11:17

## 2017-02-24 RX ADMIN — CARBIDOPA AND LEVODOPA 1 TABLET: 50; 200 TABLET, EXTENDED RELEASE ORAL at 22:00

## 2017-02-24 RX ADMIN — CARBIDOPA AND LEVODOPA 1 TABLET: 50; 200 TABLET, EXTENDED RELEASE ORAL at 13:09

## 2017-02-24 RX ADMIN — Medication 1 TABLET: at 08:26

## 2017-02-24 RX ADMIN — CLINDAMYCIN PHOSPHATE 600 MG: 12 INJECTION, SOLUTION INTRAMUSCULAR; INTRAVENOUS at 04:57

## 2017-02-24 RX ADMIN — CARBIDOPA AND LEVODOPA 1 TABLET: 50; 200 TABLET, EXTENDED RELEASE ORAL at 10:11

## 2017-02-24 RX ADMIN — SODIUM CHLORIDE 75 ML/HR: 0.9 INJECTION, SOLUTION INTRAVENOUS at 06:37

## 2017-02-24 RX ADMIN — MIRTAZAPINE 7.5 MG: 15 TABLET, FILM COATED ORAL at 21:59

## 2017-02-24 RX ADMIN — DOCUSATE SODIUM 100 MG: 100 CAPSULE, LIQUID FILLED ORAL at 18:35

## 2017-02-24 RX ADMIN — ENTACAPONE 200 MG: 200 TABLET, FILM COATED ORAL at 22:00

## 2017-02-24 RX ADMIN — ENTACAPONE 200 MG: 200 TABLET, FILM COATED ORAL at 18:35

## 2017-02-24 RX ADMIN — SENNOSIDES 8.6 MG: 8.6 TABLET, FILM COATED ORAL at 08:26

## 2017-02-24 RX ADMIN — DOCUSATE SODIUM 100 MG: 100 CAPSULE, LIQUID FILLED ORAL at 08:26

## 2017-02-24 RX ADMIN — CARBIDOPA AND LEVODOPA 1 TABLET: 50; 200 TABLET, EXTENDED RELEASE ORAL at 05:00

## 2017-02-24 RX ADMIN — Medication 1000 MG: at 08:26

## 2017-02-24 RX ADMIN — TOLTERODINE TARTRATE 4 MG: 4 CAPSULE, EXTENDED RELEASE ORAL at 08:26

## 2017-02-24 RX ADMIN — CYANOCOBALAMIN TAB 500 MCG 1000 MCG: 500 TAB at 08:26

## 2017-02-24 RX ADMIN — CALCIUM CARBONATE 500 MG (1,250 MG)-VITAMIN D3 200 UNIT TABLET 1 TABLET: at 08:26

## 2017-02-24 RX ADMIN — ENTACAPONE 200 MG: 200 TABLET, FILM COATED ORAL at 13:09

## 2017-02-24 RX ADMIN — ENTACAPONE 200 MG: 200 TABLET, FILM COATED ORAL at 05:00

## 2017-02-25 ENCOUNTER — APPOINTMENT (INPATIENT)
Dept: RADIOLOGY | Facility: HOSPITAL | Age: 82
DRG: 480 | End: 2017-02-25
Payer: MEDICARE

## 2017-02-25 LAB
BACTERIA UR QL AUTO: ABNORMAL /HPF
BILIRUB UR QL STRIP: ABNORMAL
CLARITY UR: ABNORMAL
COLOR UR: ABNORMAL
GLUCOSE UR STRIP-MCNC: NEGATIVE MG/DL
HGB UR QL STRIP.AUTO: NEGATIVE
KETONES UR STRIP-MCNC: ABNORMAL MG/DL
LEUKOCYTE ESTERASE UR QL STRIP: ABNORMAL
NITRITE UR QL STRIP: POSITIVE
NON-SQ EPI CELLS URNS QL MICRO: ABNORMAL /HPF
PH UR STRIP.AUTO: 8 [PH] (ref 4.5–8)
PROT UR STRIP-MCNC: ABNORMAL MG/DL
RBC #/AREA URNS AUTO: ABNORMAL /HPF
SP GR UR STRIP.AUTO: 1.02 (ref 1–1.03)
TRI-PHOS CRY URNS QL MICRO: ABNORMAL /HPF
UROBILINOGEN UR QL STRIP.AUTO: 0.2 E.U./DL
WBC #/AREA URNS AUTO: ABNORMAL /HPF

## 2017-02-25 PROCEDURE — 97530 THERAPEUTIC ACTIVITIES: CPT

## 2017-02-25 PROCEDURE — 97535 SELF CARE MNGMENT TRAINING: CPT

## 2017-02-25 PROCEDURE — 81001 URINALYSIS AUTO W/SCOPE: CPT | Performed by: PSYCHIATRY & NEUROLOGY

## 2017-02-25 PROCEDURE — 70450 CT HEAD/BRAIN W/O DYE: CPT

## 2017-02-25 RX ORDER — ACETAMINOPHEN 325 MG/1
650 TABLET ORAL EVERY 6 HOURS PRN
Status: DISCONTINUED | OUTPATIENT
Start: 2017-02-25 | End: 2017-03-06 | Stop reason: HOSPADM

## 2017-02-25 RX ADMIN — CYANOCOBALAMIN TAB 500 MCG 1000 MCG: 500 TAB at 08:51

## 2017-02-25 RX ADMIN — SENNOSIDES 8.6 MG: 8.6 TABLET, FILM COATED ORAL at 08:52

## 2017-02-25 RX ADMIN — CARBIDOPA AND LEVODOPA 1 TABLET: 50; 200 TABLET, EXTENDED RELEASE ORAL at 06:19

## 2017-02-25 RX ADMIN — ENOXAPARIN SODIUM 40 MG: 40 INJECTION SUBCUTANEOUS at 08:50

## 2017-02-25 RX ADMIN — MIRTAZAPINE 7.5 MG: 15 TABLET, FILM COATED ORAL at 22:04

## 2017-02-25 RX ADMIN — CARBIDOPA AND LEVODOPA 1 TABLET: 50; 200 TABLET, EXTENDED RELEASE ORAL at 09:03

## 2017-02-25 RX ADMIN — CARBIDOPA AND LEVODOPA 2 TABLET: 25; 100 TABLET ORAL at 17:15

## 2017-02-25 RX ADMIN — DOCUSATE SODIUM 100 MG: 100 CAPSULE, LIQUID FILLED ORAL at 08:53

## 2017-02-25 RX ADMIN — Medication 1 TABLET: at 08:51

## 2017-02-25 RX ADMIN — ENTACAPONE 200 MG: 200 TABLET, FILM COATED ORAL at 17:15

## 2017-02-25 RX ADMIN — CARBIDOPA AND LEVODOPA 2 TABLET: 25; 100 TABLET ORAL at 22:04

## 2017-02-25 RX ADMIN — ENTACAPONE 200 MG: 200 TABLET, FILM COATED ORAL at 22:05

## 2017-02-25 RX ADMIN — ENTACAPONE 200 MG: 200 TABLET, FILM COATED ORAL at 09:03

## 2017-02-25 RX ADMIN — CARBIDOPA AND LEVODOPA 1 TABLET: 50; 200 TABLET, EXTENDED RELEASE ORAL at 14:09

## 2017-02-25 RX ADMIN — DOCUSATE SODIUM 100 MG: 100 CAPSULE, LIQUID FILLED ORAL at 17:16

## 2017-02-25 RX ADMIN — ENTACAPONE 200 MG: 200 TABLET, FILM COATED ORAL at 06:19

## 2017-02-25 RX ADMIN — CALCIUM CARBONATE 500 MG (1,250 MG)-VITAMIN D3 200 UNIT TABLET 1 TABLET: at 08:52

## 2017-02-25 RX ADMIN — Medication 1000 MG: at 08:53

## 2017-02-25 RX ADMIN — ENTACAPONE 200 MG: 200 TABLET, FILM COATED ORAL at 14:09

## 2017-02-25 RX ADMIN — TOLTERODINE TARTRATE 4 MG: 4 CAPSULE, EXTENDED RELEASE ORAL at 08:54

## 2017-02-26 LAB
ANION GAP SERPL CALCULATED.3IONS-SCNC: 5 MMOL/L (ref 4–13)
ATRIAL RATE: 63 BPM
BACTERIA UR QL AUTO: ABNORMAL /HPF
BASOPHILS # BLD MANUAL: 0 THOUSAND/UL (ref 0–0.1)
BASOPHILS NFR MAR MANUAL: 0 % (ref 0–1)
BILIRUB UR QL STRIP: NEGATIVE
BUN SERPL-MCNC: 22 MG/DL (ref 5–25)
CALCIUM SERPL-MCNC: 8.5 MG/DL (ref 8.3–10.1)
CHLORIDE SERPL-SCNC: 103 MMOL/L (ref 100–108)
CLARITY UR: ABNORMAL
CO2 SERPL-SCNC: 30 MMOL/L (ref 21–32)
COLOR UR: ABNORMAL
CREAT SERPL-MCNC: 0.66 MG/DL (ref 0.6–1.3)
EOSINOPHIL # BLD MANUAL: 0 THOUSAND/UL (ref 0–0.4)
EOSINOPHIL NFR BLD MANUAL: 0 % (ref 0–6)
ERYTHROCYTE [DISTWIDTH] IN BLOOD BY AUTOMATED COUNT: 14.3 % (ref 11.6–15.1)
GFR SERPL CREATININE-BSD FRML MDRD: >60 ML/MIN/1.73SQ M
GLUCOSE SERPL-MCNC: 131 MG/DL (ref 65–140)
GLUCOSE SERPL-MCNC: 45 MG/DL (ref 65–140)
GLUCOSE SERPL-MCNC: 50 MG/DL (ref 65–140)
GLUCOSE SERPL-MCNC: 60 MG/DL (ref 65–140)
GLUCOSE SERPL-MCNC: 74 MG/DL (ref 65–140)
GLUCOSE SERPL-MCNC: 99 MG/DL (ref 65–140)
GLUCOSE UR STRIP-MCNC: NEGATIVE MG/DL
HCT VFR BLD AUTO: 26.9 % (ref 34.8–46.1)
HGB BLD-MCNC: 9.2 G/DL (ref 11.5–15.4)
HGB UR QL STRIP.AUTO: ABNORMAL
HYALINE CASTS #/AREA URNS LPF: ABNORMAL /LPF
KETONES UR STRIP-MCNC: ABNORMAL MG/DL
LEUKOCYTE ESTERASE UR QL STRIP: ABNORMAL
LYMPHOCYTES # BLD AUTO: 0.69 THOUSAND/UL (ref 0.6–4.47)
LYMPHOCYTES # BLD AUTO: 10 % (ref 14–44)
MCH RBC QN AUTO: 32.2 PG (ref 26.8–34.3)
MCHC RBC AUTO-ENTMCNC: 34.2 G/DL (ref 31.4–37.4)
MCV RBC AUTO: 94 FL (ref 82–98)
MONOCYTES # BLD AUTO: 0.21 THOUSAND/UL (ref 0–1.22)
MONOCYTES NFR BLD: 3 % (ref 4–12)
NEUTROPHILS # BLD MANUAL: 5.72 THOUSAND/UL (ref 1.85–7.62)
NEUTS SEG NFR BLD AUTO: 83 % (ref 43–75)
NITRITE UR QL STRIP: NEGATIVE
NON-SQ EPI CELLS URNS QL MICRO: ABNORMAL /HPF
NRBC BLD AUTO-RTO: 0 /100 WBCS
P AXIS: 27 DEGREES
PH UR STRIP.AUTO: 7.5 [PH] (ref 4.5–8)
PLATELET # BLD AUTO: 139 THOUSANDS/UL (ref 149–390)
PLATELET BLD QL SMEAR: ADEQUATE
PMV BLD AUTO: 10.6 FL (ref 8.9–12.7)
POTASSIUM SERPL-SCNC: 4 MMOL/L (ref 3.5–5.3)
PR INTERVAL: 134 MS
PROT UR STRIP-MCNC: ABNORMAL MG/DL
QRS AXIS: 44 DEGREES
QRSD INTERVAL: 150 MS
QT INTERVAL: 452 MS
QTC INTERVAL: 462 MS
RBC # BLD AUTO: 2.86 MILLION/UL (ref 3.81–5.12)
RBC #/AREA URNS AUTO: ABNORMAL /HPF
RBC MORPH BLD: NORMAL
SODIUM SERPL-SCNC: 138 MMOL/L (ref 136–145)
SP GR UR STRIP.AUTO: 1.02 (ref 1–1.03)
T WAVE AXIS: 6 DEGREES
TSH SERPL DL<=0.05 MIU/L-ACNC: 3.27 UIU/ML (ref 0.36–3.74)
UROBILINOGEN UR QL STRIP.AUTO: 0.2 E.U./DL
VARIANT LYMPHS # BLD AUTO: 4 %
VENTRICULAR RATE: 63 BPM
WBC # BLD AUTO: 6.89 THOUSAND/UL (ref 4.31–10.16)
WBC #/AREA URNS AUTO: ABNORMAL /HPF

## 2017-02-26 PROCEDURE — 97535 SELF CARE MNGMENT TRAINING: CPT

## 2017-02-26 PROCEDURE — 87086 URINE CULTURE/COLONY COUNT: CPT | Performed by: INTERNAL MEDICINE

## 2017-02-26 PROCEDURE — 93005 ELECTROCARDIOGRAM TRACING: CPT | Performed by: INTERNAL MEDICINE

## 2017-02-26 PROCEDURE — 87077 CULTURE AEROBIC IDENTIFY: CPT | Performed by: INTERNAL MEDICINE

## 2017-02-26 PROCEDURE — 85027 COMPLETE CBC AUTOMATED: CPT | Performed by: PSYCHIATRY & NEUROLOGY

## 2017-02-26 PROCEDURE — 81001 URINALYSIS AUTO W/SCOPE: CPT | Performed by: INTERNAL MEDICINE

## 2017-02-26 PROCEDURE — 84443 ASSAY THYROID STIM HORMONE: CPT | Performed by: INTERNAL MEDICINE

## 2017-02-26 PROCEDURE — 82948 REAGENT STRIP/BLOOD GLUCOSE: CPT

## 2017-02-26 PROCEDURE — 85007 BL SMEAR W/DIFF WBC COUNT: CPT | Performed by: PSYCHIATRY & NEUROLOGY

## 2017-02-26 PROCEDURE — 80048 BASIC METABOLIC PNL TOTAL CA: CPT | Performed by: PSYCHIATRY & NEUROLOGY

## 2017-02-26 PROCEDURE — 87186 SC STD MICRODIL/AGAR DIL: CPT | Performed by: INTERNAL MEDICINE

## 2017-02-26 PROCEDURE — 97530 THERAPEUTIC ACTIVITIES: CPT

## 2017-02-26 RX ORDER — DEXTROSE MONOHYDRATE 25 G/50ML
INJECTION, SOLUTION INTRAVENOUS
Status: DISPENSED
Start: 2017-02-26 | End: 2017-02-26

## 2017-02-26 RX ORDER — DEXTROSE MONOHYDRATE 50 MG/ML
75 INJECTION, SOLUTION INTRAVENOUS CONTINUOUS
Status: DISCONTINUED | OUTPATIENT
Start: 2017-02-26 | End: 2017-02-27

## 2017-02-26 RX ORDER — DEXTROSE MONOHYDRATE 25 G/50ML
25 INJECTION, SOLUTION INTRAVENOUS ONCE
Status: COMPLETED | OUTPATIENT
Start: 2017-02-26 | End: 2017-02-26

## 2017-02-26 RX ORDER — DEXTROSE MONOHYDRATE 25 G/50ML
25 INJECTION, SOLUTION INTRAVENOUS ONCE
Status: DISCONTINUED | OUTPATIENT
Start: 2017-02-26 | End: 2017-02-26

## 2017-02-26 RX ADMIN — ENTACAPONE 200 MG: 200 TABLET, FILM COATED ORAL at 18:05

## 2017-02-26 RX ADMIN — DEXTROSE MONOHYDRATE 25 ML: 25 INJECTION, SOLUTION INTRAVENOUS at 08:38

## 2017-02-26 RX ADMIN — DOCUSATE SODIUM 100 MG: 100 CAPSULE, LIQUID FILLED ORAL at 18:05

## 2017-02-26 RX ADMIN — DEXTROSE 75 ML/HR: 5 SOLUTION INTRAVENOUS at 12:59

## 2017-02-26 RX ADMIN — CARBIDOPA AND LEVODOPA 2 TABLET: 25; 100 TABLET ORAL at 18:05

## 2017-02-26 RX ADMIN — ENOXAPARIN SODIUM 40 MG: 40 INJECTION SUBCUTANEOUS at 08:24

## 2017-02-27 LAB
GLUCOSE SERPL-MCNC: 106 MG/DL (ref 65–140)
GLUCOSE SERPL-MCNC: 144 MG/DL (ref 65–140)
GLUCOSE SERPL-MCNC: 162 MG/DL (ref 65–140)
GLUCOSE SERPL-MCNC: 73 MG/DL (ref 65–140)

## 2017-02-27 PROCEDURE — 82948 REAGENT STRIP/BLOOD GLUCOSE: CPT

## 2017-02-27 PROCEDURE — 97116 GAIT TRAINING THERAPY: CPT

## 2017-02-27 RX ORDER — MAGNESIUM CARB/ALUMINUM HYDROX 105-160MG
296 TABLET,CHEWABLE ORAL ONCE
Status: COMPLETED | OUTPATIENT
Start: 2017-02-27 | End: 2017-02-27

## 2017-02-27 RX ORDER — SODIUM PHOSPHATE, DIBASIC AND SODIUM PHOSPHATE, MONOBASIC 7; 19 G/133ML; G/133ML
1 ENEMA RECTAL DAILY PRN
Status: DISCONTINUED | OUTPATIENT
Start: 2017-02-27 | End: 2017-03-02

## 2017-02-27 RX ORDER — POLYETHYLENE GLYCOL 3350 17 G/17G
17 POWDER, FOR SOLUTION ORAL DAILY
Status: DISCONTINUED | OUTPATIENT
Start: 2017-02-27 | End: 2017-03-06 | Stop reason: HOSPADM

## 2017-02-27 RX ORDER — OXYCODONE HYDROCHLORIDE 5 MG/1
5 TABLET ORAL EVERY 4 HOURS PRN
Qty: 30 TABLET | Refills: 0
Start: 2017-02-27 | End: 2017-03-06 | Stop reason: HOSPADM

## 2017-02-27 RX ADMIN — ENTACAPONE 200 MG: 200 TABLET, FILM COATED ORAL at 14:31

## 2017-02-27 RX ADMIN — DOCUSATE SODIUM 100 MG: 100 CAPSULE, LIQUID FILLED ORAL at 08:51

## 2017-02-27 RX ADMIN — DOCUSATE SODIUM 100 MG: 100 CAPSULE, LIQUID FILLED ORAL at 18:41

## 2017-02-27 RX ADMIN — Medication 1000 MG: at 08:51

## 2017-02-27 RX ADMIN — ENTACAPONE 200 MG: 200 TABLET, FILM COATED ORAL at 22:05

## 2017-02-27 RX ADMIN — DEXTROSE 75 ML/HR: 5 SOLUTION INTRAVENOUS at 02:27

## 2017-02-27 RX ADMIN — MIRTAZAPINE 7.5 MG: 15 TABLET, FILM COATED ORAL at 22:05

## 2017-02-27 RX ADMIN — SODIUM PHOSPHATE 1 ENEMA: 7; 19 ENEMA RECTAL at 12:27

## 2017-02-27 RX ADMIN — ENOXAPARIN SODIUM 40 MG: 40 INJECTION SUBCUTANEOUS at 08:53

## 2017-02-27 RX ADMIN — SENNOSIDES 8.6 MG: 8.6 TABLET, FILM COATED ORAL at 08:51

## 2017-02-27 RX ADMIN — ENTACAPONE 200 MG: 200 TABLET, FILM COATED ORAL at 12:01

## 2017-02-27 RX ADMIN — CARBIDOPA AND LEVODOPA 2 TABLET: 25; 100 TABLET ORAL at 06:36

## 2017-02-27 RX ADMIN — CARBIDOPA AND LEVODOPA 2 TABLET: 25; 100 TABLET ORAL at 18:41

## 2017-02-27 RX ADMIN — POLYETHYLENE GLYCOL 3350 17 G: 17 POWDER, FOR SOLUTION ORAL at 12:00

## 2017-02-27 RX ADMIN — ENTACAPONE 200 MG: 200 TABLET, FILM COATED ORAL at 18:41

## 2017-02-27 RX ADMIN — MAGESIUM CITRATE 296 ML: 1.75 LIQUID ORAL at 16:06

## 2017-02-27 RX ADMIN — ENTACAPONE 200 MG: 200 TABLET, FILM COATED ORAL at 06:36

## 2017-02-27 RX ADMIN — CYANOCOBALAMIN TAB 500 MCG 1000 MCG: 500 TAB at 08:51

## 2017-02-27 RX ADMIN — CARBIDOPA AND LEVODOPA 2 TABLET: 25; 100 TABLET ORAL at 22:05

## 2017-02-27 RX ADMIN — TOLTERODINE TARTRATE 4 MG: 4 CAPSULE, EXTENDED RELEASE ORAL at 08:52

## 2017-02-27 RX ADMIN — CALCIUM CARBONATE 500 MG (1,250 MG)-VITAMIN D3 200 UNIT TABLET 1 TABLET: at 08:51

## 2017-02-27 RX ADMIN — CARBIDOPA AND LEVODOPA 2 TABLET: 25; 100 TABLET ORAL at 12:01

## 2017-02-27 RX ADMIN — CARBIDOPA AND LEVODOPA 2 TABLET: 25; 100 TABLET ORAL at 14:31

## 2017-02-27 RX ADMIN — Medication 1 TABLET: at 08:51

## 2017-02-28 LAB
ANION GAP SERPL CALCULATED.3IONS-SCNC: 6 MMOL/L (ref 4–13)
BACTERIA UR CULT: NORMAL
BUN SERPL-MCNC: 20 MG/DL (ref 5–25)
CALCIUM SERPL-MCNC: 8.1 MG/DL (ref 8.3–10.1)
CHLORIDE SERPL-SCNC: 101 MMOL/L (ref 100–108)
CO2 SERPL-SCNC: 31 MMOL/L (ref 21–32)
CORTIS AM PEAK SERPL-MCNC: 11.8 UG/ML (ref 4.2–22.4)
CREAT SERPL-MCNC: 0.68 MG/DL (ref 0.6–1.3)
GFR SERPL CREATININE-BSD FRML MDRD: >60 ML/MIN/1.73SQ M
GLUCOSE SERPL-MCNC: 100 MG/DL (ref 65–140)
GLUCOSE SERPL-MCNC: 141 MG/DL (ref 65–140)
GLUCOSE SERPL-MCNC: 146 MG/DL (ref 65–140)
GLUCOSE SERPL-MCNC: 155 MG/DL (ref 65–140)
GLUCOSE SERPL-MCNC: 21 MG/DL (ref 65–140)
GLUCOSE SERPL-MCNC: 221 MG/DL (ref 65–140)
GLUCOSE SERPL-MCNC: 28 MG/DL (ref 65–140)
GLUCOSE SERPL-MCNC: 29 MG/DL (ref 65–140)
GLUCOSE SERPL-MCNC: 30 MG/DL (ref 65–140)
GLUCOSE SERPL-MCNC: 300 MG/DL (ref 65–140)
GLUCOSE SERPL-MCNC: 442 MG/DL (ref 65–140)
GLUCOSE SERPL-MCNC: <20 MG/DL (ref 65–140)
GLUCOSE SERPL-MCNC: <20 MG/DL (ref 65–140)
POTASSIUM SERPL-SCNC: 3.9 MMOL/L (ref 3.5–5.3)
SODIUM SERPL-SCNC: 138 MMOL/L (ref 136–145)

## 2017-02-28 PROCEDURE — 82533 TOTAL CORTISOL: CPT | Performed by: INTERNAL MEDICINE

## 2017-02-28 PROCEDURE — 82948 REAGENT STRIP/BLOOD GLUCOSE: CPT

## 2017-02-28 PROCEDURE — 80048 BASIC METABOLIC PNL TOTAL CA: CPT | Performed by: INTERNAL MEDICINE

## 2017-02-28 PROCEDURE — 97530 THERAPEUTIC ACTIVITIES: CPT

## 2017-02-28 RX ORDER — DEXTROSE MONOHYDRATE 25 G/50ML
INJECTION, SOLUTION INTRAVENOUS
Status: COMPLETED
Start: 2017-02-28 | End: 2017-02-28

## 2017-02-28 RX ORDER — DEXTROSE MONOHYDRATE 25 G/50ML
50 INJECTION, SOLUTION INTRAVENOUS ONCE
Status: COMPLETED | OUTPATIENT
Start: 2017-02-28 | End: 2017-02-28

## 2017-02-28 RX ADMIN — ENTACAPONE 200 MG: 200 TABLET, FILM COATED ORAL at 17:31

## 2017-02-28 RX ADMIN — DOCUSATE SODIUM 100 MG: 100 CAPSULE, LIQUID FILLED ORAL at 17:31

## 2017-02-28 RX ADMIN — ENTACAPONE 200 MG: 200 TABLET, FILM COATED ORAL at 06:14

## 2017-02-28 RX ADMIN — CALCIUM CARBONATE 500 MG (1,250 MG)-VITAMIN D3 200 UNIT TABLET 1 TABLET: at 08:30

## 2017-02-28 RX ADMIN — TOLTERODINE TARTRATE 4 MG: 4 CAPSULE, EXTENDED RELEASE ORAL at 08:32

## 2017-02-28 RX ADMIN — ENTACAPONE 200 MG: 200 TABLET, FILM COATED ORAL at 14:39

## 2017-02-28 RX ADMIN — CARBIDOPA AND LEVODOPA 2 TABLET: 25; 100 TABLET ORAL at 17:31

## 2017-02-28 RX ADMIN — ENTACAPONE 200 MG: 200 TABLET, FILM COATED ORAL at 11:22

## 2017-02-28 RX ADMIN — Medication 1000 MG: at 08:31

## 2017-02-28 RX ADMIN — DOCUSATE SODIUM 100 MG: 100 CAPSULE, LIQUID FILLED ORAL at 08:30

## 2017-02-28 RX ADMIN — DEXTROSE MONOHYDRATE 50 ML: 25 INJECTION, SOLUTION INTRAVENOUS at 07:16

## 2017-02-28 RX ADMIN — Medication 1 TABLET: at 08:30

## 2017-02-28 RX ADMIN — CARBIDOPA AND LEVODOPA 2 TABLET: 25; 100 TABLET ORAL at 14:39

## 2017-02-28 RX ADMIN — SENNOSIDES 8.6 MG: 8.6 TABLET, FILM COATED ORAL at 08:30

## 2017-02-28 RX ADMIN — ENTACAPONE 200 MG: 200 TABLET, FILM COATED ORAL at 21:36

## 2017-02-28 RX ADMIN — CYANOCOBALAMIN TAB 500 MCG 1000 MCG: 500 TAB at 08:31

## 2017-02-28 RX ADMIN — ENOXAPARIN SODIUM 40 MG: 40 INJECTION SUBCUTANEOUS at 08:31

## 2017-02-28 RX ADMIN — CARBIDOPA AND LEVODOPA 2 TABLET: 25; 100 TABLET ORAL at 21:35

## 2017-02-28 RX ADMIN — POLYETHYLENE GLYCOL 3350 17 G: 17 POWDER, FOR SOLUTION ORAL at 08:31

## 2017-02-28 RX ADMIN — CARBIDOPA AND LEVODOPA 2 TABLET: 25; 100 TABLET ORAL at 06:14

## 2017-02-28 RX ADMIN — DEXTROSE MONOHYDRATE 50 ML: 25 INJECTION, SOLUTION INTRAVENOUS at 06:39

## 2017-02-28 RX ADMIN — MIRTAZAPINE 7.5 MG: 15 TABLET, FILM COATED ORAL at 21:35

## 2017-02-28 RX ADMIN — CARBIDOPA AND LEVODOPA 2 TABLET: 25; 100 TABLET ORAL at 11:22

## 2017-03-01 ENCOUNTER — APPOINTMENT (INPATIENT)
Dept: PHYSICAL THERAPY | Facility: HOSPITAL | Age: 82
DRG: 480 | End: 2017-03-01
Payer: MEDICARE

## 2017-03-01 ENCOUNTER — APPOINTMENT (INPATIENT)
Dept: OCCUPATIONAL THERAPY | Facility: HOSPITAL | Age: 82
DRG: 480 | End: 2017-03-01
Payer: MEDICARE

## 2017-03-01 PROBLEM — D62 ANEMIA ASSOCIATED WITH ACUTE BLOOD LOSS: Status: ACTIVE | Noted: 2017-03-01

## 2017-03-01 LAB
25(OH)D3 SERPL-MCNC: 38.1 NG/ML (ref 30–100)
ANION GAP SERPL CALCULATED.3IONS-SCNC: 5 MMOL/L (ref 4–13)
BUN SERPL-MCNC: 22 MG/DL (ref 5–25)
CALCIUM SERPL-MCNC: 8.3 MG/DL (ref 8.3–10.1)
CHLORIDE SERPL-SCNC: 100 MMOL/L (ref 100–108)
CO2 SERPL-SCNC: 31 MMOL/L (ref 21–32)
CREAT SERPL-MCNC: 0.76 MG/DL (ref 0.6–1.3)
ERYTHROCYTE [DISTWIDTH] IN BLOOD BY AUTOMATED COUNT: 14.4 % (ref 11.6–15.1)
GFR SERPL CREATININE-BSD FRML MDRD: >60 ML/MIN/1.73SQ M
GLUCOSE SERPL-MCNC: 110 MG/DL (ref 65–140)
GLUCOSE SERPL-MCNC: 114 MG/DL (ref 65–140)
GLUCOSE SERPL-MCNC: 164 MG/DL (ref 65–140)
GLUCOSE SERPL-MCNC: 184 MG/DL (ref 65–140)
GLUCOSE SERPL-MCNC: 191 MG/DL (ref 65–140)
GLUCOSE SERPL-MCNC: 191 MG/DL (ref 65–140)
GLUCOSE SERPL-MCNC: 49 MG/DL (ref 65–140)
GLUCOSE SERPL-MCNC: 74 MG/DL (ref 65–140)
GLUCOSE SERPL-MCNC: 93 MG/DL (ref 65–140)
HCT VFR BLD AUTO: 29.7 % (ref 34.8–46.1)
HGB BLD-MCNC: 10 G/DL (ref 11.5–15.4)
INSULIN SERPL-ACNC: 56.1 MU/L (ref 3–25)
MCH RBC QN AUTO: 31.8 PG (ref 26.8–34.3)
MCHC RBC AUTO-ENTMCNC: 33.7 G/DL (ref 31.4–37.4)
MCV RBC AUTO: 95 FL (ref 82–98)
PLATELET # BLD AUTO: 230 THOUSANDS/UL (ref 149–390)
PMV BLD AUTO: 9.7 FL (ref 8.9–12.7)
POTASSIUM SERPL-SCNC: 4.2 MMOL/L (ref 3.5–5.3)
RBC # BLD AUTO: 3.14 MILLION/UL (ref 3.81–5.12)
SODIUM SERPL-SCNC: 136 MMOL/L (ref 136–145)
WBC # BLD AUTO: 8.43 THOUSAND/UL (ref 4.31–10.16)

## 2017-03-01 PROCEDURE — 85027 COMPLETE CBC AUTOMATED: CPT | Performed by: INTERNAL MEDICINE

## 2017-03-01 PROCEDURE — 97530 THERAPEUTIC ACTIVITIES: CPT

## 2017-03-01 PROCEDURE — 82948 REAGENT STRIP/BLOOD GLUCOSE: CPT

## 2017-03-01 PROCEDURE — 80048 BASIC METABOLIC PNL TOTAL CA: CPT | Performed by: INTERNAL MEDICINE

## 2017-03-01 PROCEDURE — 82306 VITAMIN D 25 HYDROXY: CPT | Performed by: INTERNAL MEDICINE

## 2017-03-01 PROCEDURE — 84681 ASSAY OF C-PEPTIDE: CPT | Performed by: INTERNAL MEDICINE

## 2017-03-01 PROCEDURE — 84305 ASSAY OF SOMATOMEDIN: CPT | Performed by: INTERNAL MEDICINE

## 2017-03-01 PROCEDURE — 83525 ASSAY OF INSULIN: CPT | Performed by: INTERNAL MEDICINE

## 2017-03-01 PROCEDURE — 82947 ASSAY GLUCOSE BLOOD QUANT: CPT | Performed by: INTERNAL MEDICINE

## 2017-03-01 PROCEDURE — 84165 PROTEIN E-PHORESIS SERUM: CPT | Performed by: INTERNAL MEDICINE

## 2017-03-01 PROCEDURE — 84206 ASSAY OF PROINSULIN: CPT | Performed by: INTERNAL MEDICINE

## 2017-03-01 PROCEDURE — 97535 SELF CARE MNGMENT TRAINING: CPT

## 2017-03-01 RX ORDER — DEXTROSE MONOHYDRATE 25 G/50ML
25 INJECTION, SOLUTION INTRAVENOUS ONCE
Status: COMPLETED | OUTPATIENT
Start: 2017-03-01 | End: 2017-03-01

## 2017-03-01 RX ORDER — DEXTROSE MONOHYDRATE 25 G/50ML
25 INJECTION, SOLUTION INTRAVENOUS AS NEEDED
Status: DISCONTINUED | OUTPATIENT
Start: 2017-03-01 | End: 2017-03-06 | Stop reason: HOSPADM

## 2017-03-01 RX ADMIN — TOLTERODINE TARTRATE 4 MG: 4 CAPSULE, EXTENDED RELEASE ORAL at 08:25

## 2017-03-01 RX ADMIN — MIRTAZAPINE 7.5 MG: 15 TABLET, FILM COATED ORAL at 22:21

## 2017-03-01 RX ADMIN — CARBIDOPA AND LEVODOPA 2 TABLET: 25; 100 TABLET ORAL at 14:25

## 2017-03-01 RX ADMIN — DOCUSATE SODIUM 100 MG: 100 CAPSULE, LIQUID FILLED ORAL at 17:39

## 2017-03-01 RX ADMIN — Medication 1 TABLET: at 08:25

## 2017-03-01 RX ADMIN — ENTACAPONE 200 MG: 200 TABLET, FILM COATED ORAL at 17:39

## 2017-03-01 RX ADMIN — CARBIDOPA AND LEVODOPA 2 TABLET: 25; 100 TABLET ORAL at 10:59

## 2017-03-01 RX ADMIN — DOCUSATE SODIUM 100 MG: 100 CAPSULE, LIQUID FILLED ORAL at 08:25

## 2017-03-01 RX ADMIN — CYANOCOBALAMIN TAB 500 MCG 1000 MCG: 500 TAB at 08:25

## 2017-03-01 RX ADMIN — CARBIDOPA AND LEVODOPA 2 TABLET: 25; 100 TABLET ORAL at 22:21

## 2017-03-01 RX ADMIN — CARBIDOPA AND LEVODOPA 2 TABLET: 25; 100 TABLET ORAL at 06:04

## 2017-03-01 RX ADMIN — ENTACAPONE 200 MG: 200 TABLET, FILM COATED ORAL at 14:25

## 2017-03-01 RX ADMIN — ENOXAPARIN SODIUM 40 MG: 40 INJECTION SUBCUTANEOUS at 08:25

## 2017-03-01 RX ADMIN — DEXTROSE MONOHYDRATE 25 ML: 25 INJECTION, SOLUTION INTRAVENOUS at 04:36

## 2017-03-01 RX ADMIN — Medication 1000 MG: at 08:25

## 2017-03-01 RX ADMIN — ENTACAPONE 200 MG: 200 TABLET, FILM COATED ORAL at 06:04

## 2017-03-01 RX ADMIN — ENTACAPONE 200 MG: 200 TABLET, FILM COATED ORAL at 22:22

## 2017-03-01 RX ADMIN — SENNOSIDES 8.6 MG: 8.6 TABLET, FILM COATED ORAL at 08:25

## 2017-03-01 RX ADMIN — POLYETHYLENE GLYCOL 3350 17 G: 17 POWDER, FOR SOLUTION ORAL at 08:25

## 2017-03-01 RX ADMIN — CALCIUM CARBONATE 500 MG (1,250 MG)-VITAMIN D3 200 UNIT TABLET 1 TABLET: at 08:25

## 2017-03-01 RX ADMIN — CARBIDOPA AND LEVODOPA 2 TABLET: 25; 100 TABLET ORAL at 17:39

## 2017-03-01 RX ADMIN — ENTACAPONE 200 MG: 200 TABLET, FILM COATED ORAL at 10:59

## 2017-03-02 ENCOUNTER — GENERIC CONVERSION - ENCOUNTER (OUTPATIENT)
Dept: OTHER | Facility: OTHER | Age: 82
End: 2017-03-02

## 2017-03-02 ENCOUNTER — APPOINTMENT (INPATIENT)
Dept: NEUROLOGY | Facility: AMBULATORY SURGERY CENTER | Age: 82
DRG: 480 | End: 2017-03-02
Payer: MEDICARE

## 2017-03-02 ENCOUNTER — APPOINTMENT (INPATIENT)
Dept: PHYSICAL THERAPY | Facility: HOSPITAL | Age: 82
DRG: 480 | End: 2017-03-02
Payer: MEDICARE

## 2017-03-02 LAB
ANION GAP SERPL CALCULATED.3IONS-SCNC: 5 MMOL/L (ref 4–13)
BASE EXCESS BLDA CALC-SCNC: 6 MMOL/L (ref -2–3)
BUN SERPL-MCNC: 25 MG/DL (ref 5–25)
C PEPTIDE SERPL-MCNC: 6.9 NG/ML (ref 1.1–4.4)
CA-I BLD-SCNC: 1.15 MMOL/L (ref 1.12–1.32)
CALCIUM SERPL-MCNC: 8.5 MG/DL (ref 8.3–10.1)
CHLORIDE SERPL-SCNC: 100 MMOL/L (ref 100–108)
CO2 SERPL-SCNC: 33 MMOL/L (ref 21–32)
CREAT SERPL-MCNC: 0.76 MG/DL (ref 0.6–1.3)
ERYTHROCYTE [DISTWIDTH] IN BLOOD BY AUTOMATED COUNT: 14.7 % (ref 11.6–15.1)
FIO2 GAS DIL.REBREATH: 21 L
GFR SERPL CREATININE-BSD FRML MDRD: >60 ML/MIN/1.73SQ M
GLUCOSE SERPL-MCNC: 101 MG/DL (ref 65–140)
GLUCOSE SERPL-MCNC: 104 MG/DL (ref 65–140)
GLUCOSE SERPL-MCNC: 105 MG/DL (ref 65–140)
GLUCOSE SERPL-MCNC: 105 MG/DL (ref 65–140)
GLUCOSE SERPL-MCNC: 110 MG/DL (ref 65–140)
GLUCOSE SERPL-MCNC: 131 MG/DL (ref 65–140)
GLUCOSE SERPL-MCNC: 138 MG/DL (ref 65–140)
GLUCOSE SERPL-MCNC: 143 MG/DL (ref 65–140)
GLUCOSE SERPL-MCNC: 159 MG/DL (ref 65–140)
GLUCOSE SERPL-MCNC: 163 MG/DL (ref 65–140)
GLUCOSE SERPL-MCNC: 38 MG/DL (ref 65–140)
GLUCOSE SERPL-MCNC: 80 MG/DL (ref 65–140)
GLUCOSE SERPL-MCNC: 99 MG/DL (ref 65–140)
HCO3 BLDA-SCNC: 30.3 MMOL/L (ref 22–28)
HCT VFR BLD AUTO: 27.7 % (ref 34.8–46.1)
HCT VFR BLD CALC: 25 % (ref 34.8–46.1)
HGB BLD-MCNC: 9.1 G/DL (ref 11.5–15.4)
HGB BLDA-MCNC: 8.5 G/DL (ref 11.5–15.4)
IGF-I SERPL-MCNC: 74 NG/ML (ref 34–178)
LABORATORY COMMENT REPORT: NORMAL
MCH RBC QN AUTO: 31.4 PG (ref 26.8–34.3)
MCHC RBC AUTO-ENTMCNC: 32.9 G/DL (ref 31.4–37.4)
MCV RBC AUTO: 96 FL (ref 82–98)
PCO2 BLD: 32 MMOL/L (ref 21–32)
PCO2 BLD: 43.4 MM HG (ref 36–44)
PH BLD: 7.45 [PH] (ref 7.35–7.45)
PLATELET # BLD AUTO: 259 THOUSANDS/UL (ref 149–390)
PMV BLD AUTO: 9.8 FL (ref 8.9–12.7)
PO2 BLD: 68 MM HG (ref 75–129)
POTASSIUM BLD-SCNC: 4.3 MMOL/L (ref 3.5–5.3)
POTASSIUM SERPL-SCNC: 4.5 MMOL/L (ref 3.5–5.3)
RBC # BLD AUTO: 2.9 MILLION/UL (ref 3.81–5.12)
SAO2 % BLD FROM PO2: 94 % (ref 95–98)
SODIUM BLD-SCNC: 136 MMOL/L (ref 136–145)
SODIUM SERPL-SCNC: 138 MMOL/L (ref 136–145)
SPECIMEN SOURCE: ABNORMAL
WBC # BLD AUTO: 7.37 THOUSAND/UL (ref 4.31–10.16)

## 2017-03-02 PROCEDURE — 82948 REAGENT STRIP/BLOOD GLUCOSE: CPT

## 2017-03-02 PROCEDURE — 82803 BLOOD GASES ANY COMBINATION: CPT

## 2017-03-02 PROCEDURE — 82330 ASSAY OF CALCIUM: CPT

## 2017-03-02 PROCEDURE — 97116 GAIT TRAINING THERAPY: CPT

## 2017-03-02 PROCEDURE — 80048 BASIC METABOLIC PNL TOTAL CA: CPT | Performed by: INTERNAL MEDICINE

## 2017-03-02 PROCEDURE — 82947 ASSAY GLUCOSE BLOOD QUANT: CPT

## 2017-03-02 PROCEDURE — 84132 ASSAY OF SERUM POTASSIUM: CPT

## 2017-03-02 PROCEDURE — 85027 COMPLETE CBC AUTOMATED: CPT | Performed by: INTERNAL MEDICINE

## 2017-03-02 PROCEDURE — 97530 THERAPEUTIC ACTIVITIES: CPT

## 2017-03-02 PROCEDURE — 36600 WITHDRAWAL OF ARTERIAL BLOOD: CPT

## 2017-03-02 PROCEDURE — 95816 EEG AWAKE AND DROWSY: CPT

## 2017-03-02 PROCEDURE — 84295 ASSAY OF SERUM SODIUM: CPT

## 2017-03-02 PROCEDURE — 85014 HEMATOCRIT: CPT

## 2017-03-02 RX ADMIN — DOCUSATE SODIUM 100 MG: 100 CAPSULE, LIQUID FILLED ORAL at 10:30

## 2017-03-02 RX ADMIN — CARBIDOPA AND LEVODOPA 2 TABLET: 25; 100 TABLET ORAL at 10:30

## 2017-03-02 RX ADMIN — ENTACAPONE 200 MG: 200 TABLET, FILM COATED ORAL at 10:31

## 2017-03-02 RX ADMIN — Medication 1 TABLET: at 10:31

## 2017-03-02 RX ADMIN — SENNOSIDES 8.6 MG: 8.6 TABLET, FILM COATED ORAL at 10:30

## 2017-03-02 RX ADMIN — ACETAMINOPHEN 650 MG: 325 TABLET, FILM COATED ORAL at 03:03

## 2017-03-02 RX ADMIN — CYANOCOBALAMIN TAB 500 MCG 1000 MCG: 500 TAB at 10:30

## 2017-03-02 RX ADMIN — CARBIDOPA AND LEVODOPA 2 TABLET: 25; 100 TABLET ORAL at 22:17

## 2017-03-02 RX ADMIN — TOLTERODINE TARTRATE 4 MG: 4 CAPSULE, EXTENDED RELEASE ORAL at 10:31

## 2017-03-02 RX ADMIN — ENTACAPONE 200 MG: 200 TABLET, FILM COATED ORAL at 07:45

## 2017-03-02 RX ADMIN — ENTACAPONE 200 MG: 200 TABLET, FILM COATED ORAL at 22:17

## 2017-03-02 RX ADMIN — ENOXAPARIN SODIUM 40 MG: 40 INJECTION SUBCUTANEOUS at 10:31

## 2017-03-02 RX ADMIN — CARBIDOPA AND LEVODOPA 2 TABLET: 25; 100 TABLET ORAL at 07:44

## 2017-03-02 RX ADMIN — CALCIUM CARBONATE 500 MG (1,250 MG)-VITAMIN D3 200 UNIT TABLET 1 TABLET: at 10:30

## 2017-03-02 RX ADMIN — Medication 1000 MG: at 10:31

## 2017-03-02 RX ADMIN — POLYETHYLENE GLYCOL 3350 17 G: 17 POWDER, FOR SOLUTION ORAL at 10:31

## 2017-03-03 LAB
ATRIAL RATE: 55 BPM
GLUCOSE SERPL-MCNC: 107 MG/DL (ref 65–140)
GLUCOSE SERPL-MCNC: 116 MG/DL (ref 65–140)
GLUCOSE SERPL-MCNC: 131 MG/DL (ref 65–140)
GLUCOSE SERPL-MCNC: 133 MG/DL (ref 65–140)
GLUCOSE SERPL-MCNC: 156 MG/DL (ref 65–140)
GLUCOSE SERPL-MCNC: 175 MG/DL (ref 65–140)
GLUCOSE SERPL-MCNC: 87 MG/DL (ref 65–140)
P AXIS: 24 DEGREES
PR INTERVAL: 142 MS
PROINSULIN SERPL-SCNC: 6.7 PMOL/L (ref 0–10)
QRS AXIS: 4 DEGREES
QRSD INTERVAL: 148 MS
QT INTERVAL: 470 MS
QTC INTERVAL: 449 MS
T WAVE AXIS: 10 DEGREES
VENTRICULAR RATE: 55 BPM

## 2017-03-03 PROCEDURE — 97112 NEUROMUSCULAR REEDUCATION: CPT

## 2017-03-03 PROCEDURE — 97530 THERAPEUTIC ACTIVITIES: CPT

## 2017-03-03 PROCEDURE — 82948 REAGENT STRIP/BLOOD GLUCOSE: CPT

## 2017-03-03 PROCEDURE — 97535 SELF CARE MNGMENT TRAINING: CPT

## 2017-03-03 RX ORDER — SULFAMETHOXAZOLE AND TRIMETHOPRIM 800; 160 MG/1; MG/1
1 TABLET ORAL EVERY 12 HOURS SCHEDULED
Refills: 0 | Status: CANCELLED
Start: 2017-03-03 | End: 2017-03-06

## 2017-03-03 RX ORDER — DEXTROSE MONOHYDRATE 25 G/50ML
25 INJECTION, SOLUTION INTRAVENOUS AS NEEDED
Qty: 50 ML | Refills: 0 | Status: CANCELLED
Start: 2017-03-03 | End: 2017-04-02

## 2017-03-03 RX ORDER — SULFAMETHOXAZOLE AND TRIMETHOPRIM 800; 160 MG/1; MG/1
1 TABLET ORAL EVERY 12 HOURS SCHEDULED
Status: COMPLETED | OUTPATIENT
Start: 2017-03-03 | End: 2017-03-05

## 2017-03-03 RX ADMIN — ENTACAPONE 200 MG: 200 TABLET, FILM COATED ORAL at 17:52

## 2017-03-03 RX ADMIN — Medication 1 TABLET: at 09:18

## 2017-03-03 RX ADMIN — ENTACAPONE 200 MG: 200 TABLET, FILM COATED ORAL at 09:17

## 2017-03-03 RX ADMIN — ENTACAPONE 200 MG: 200 TABLET, FILM COATED ORAL at 13:34

## 2017-03-03 RX ADMIN — CARBIDOPA AND LEVODOPA 2 TABLET: 25; 100 TABLET ORAL at 09:17

## 2017-03-03 RX ADMIN — CARBIDOPA AND LEVODOPA 2 TABLET: 25; 100 TABLET ORAL at 13:34

## 2017-03-03 RX ADMIN — CARBIDOPA AND LEVODOPA 2 TABLET: 25; 100 TABLET ORAL at 06:10

## 2017-03-03 RX ADMIN — SULFAMETHOXAZOLE AND TRIMETHOPRIM 1 TABLET: 800; 160 TABLET ORAL at 13:34

## 2017-03-03 RX ADMIN — MIRTAZAPINE 7.5 MG: 15 TABLET, FILM COATED ORAL at 21:23

## 2017-03-03 RX ADMIN — Medication 1000 MG: at 09:17

## 2017-03-03 RX ADMIN — ENTACAPONE 200 MG: 200 TABLET, FILM COATED ORAL at 06:10

## 2017-03-03 RX ADMIN — ENOXAPARIN SODIUM 40 MG: 40 INJECTION SUBCUTANEOUS at 09:18

## 2017-03-03 RX ADMIN — SULFAMETHOXAZOLE AND TRIMETHOPRIM 1 TABLET: 800; 160 TABLET ORAL at 21:23

## 2017-03-03 RX ADMIN — CALCIUM CARBONATE 500 MG (1,250 MG)-VITAMIN D3 200 UNIT TABLET 1 TABLET: at 09:18

## 2017-03-03 RX ADMIN — TOLTERODINE TARTRATE 4 MG: 4 CAPSULE, EXTENDED RELEASE ORAL at 09:17

## 2017-03-03 RX ADMIN — CARBIDOPA AND LEVODOPA 2 TABLET: 25; 100 TABLET ORAL at 17:52

## 2017-03-03 RX ADMIN — CYANOCOBALAMIN TAB 500 MCG 1000 MCG: 500 TAB at 09:18

## 2017-03-03 RX ADMIN — ENTACAPONE 200 MG: 200 TABLET, FILM COATED ORAL at 21:23

## 2017-03-03 RX ADMIN — CARBIDOPA AND LEVODOPA 2 TABLET: 25; 100 TABLET ORAL at 21:23

## 2017-03-04 ENCOUNTER — APPOINTMENT (INPATIENT)
Dept: NON INVASIVE DIAGNOSTICS | Facility: HOSPITAL | Age: 82
DRG: 480 | End: 2017-03-04
Payer: MEDICARE

## 2017-03-04 LAB
GLUCOSE SERPL-MCNC: 107 MG/DL (ref 65–140)
GLUCOSE SERPL-MCNC: 110 MG/DL (ref 65–140)
GLUCOSE SERPL-MCNC: 111 MG/DL (ref 65–140)
GLUCOSE SERPL-MCNC: 117 MG/DL (ref 65–140)
GLUCOSE SERPL-MCNC: 118 MG/DL (ref 65–140)
GLUCOSE SERPL-MCNC: 118 MG/DL (ref 65–140)
GLUCOSE SERPL-MCNC: 120 MG/DL (ref 65–140)

## 2017-03-04 PROCEDURE — 93306 TTE W/DOPPLER COMPLETE: CPT

## 2017-03-04 PROCEDURE — 82948 REAGENT STRIP/BLOOD GLUCOSE: CPT

## 2017-03-04 PROCEDURE — 97530 THERAPEUTIC ACTIVITIES: CPT

## 2017-03-04 PROCEDURE — 97112 NEUROMUSCULAR REEDUCATION: CPT

## 2017-03-04 RX ADMIN — ENTACAPONE 200 MG: 200 TABLET, FILM COATED ORAL at 21:32

## 2017-03-04 RX ADMIN — Medication 1000 MG: at 08:12

## 2017-03-04 RX ADMIN — CARBIDOPA AND LEVODOPA 2 TABLET: 25; 100 TABLET ORAL at 21:32

## 2017-03-04 RX ADMIN — SULFAMETHOXAZOLE AND TRIMETHOPRIM 1 TABLET: 800; 160 TABLET ORAL at 21:32

## 2017-03-04 RX ADMIN — CARBIDOPA AND LEVODOPA 2 TABLET: 25; 100 TABLET ORAL at 05:58

## 2017-03-04 RX ADMIN — CALCIUM CARBONATE 500 MG (1,250 MG)-VITAMIN D3 200 UNIT TABLET 1 TABLET: at 08:11

## 2017-03-04 RX ADMIN — CYANOCOBALAMIN TAB 500 MCG 1000 MCG: 500 TAB at 08:11

## 2017-03-04 RX ADMIN — CARBIDOPA AND LEVODOPA 2 TABLET: 25; 100 TABLET ORAL at 13:41

## 2017-03-04 RX ADMIN — ENTACAPONE 200 MG: 200 TABLET, FILM COATED ORAL at 05:57

## 2017-03-04 RX ADMIN — CARBIDOPA AND LEVODOPA 2 TABLET: 25; 100 TABLET ORAL at 17:34

## 2017-03-04 RX ADMIN — ENTACAPONE 200 MG: 200 TABLET, FILM COATED ORAL at 13:41

## 2017-03-04 RX ADMIN — ENOXAPARIN SODIUM 40 MG: 40 INJECTION SUBCUTANEOUS at 08:11

## 2017-03-04 RX ADMIN — ENTACAPONE 200 MG: 200 TABLET, FILM COATED ORAL at 11:07

## 2017-03-04 RX ADMIN — CARBIDOPA AND LEVODOPA 2 TABLET: 25; 100 TABLET ORAL at 11:08

## 2017-03-04 RX ADMIN — Medication 1 TABLET: at 08:11

## 2017-03-04 RX ADMIN — TOLTERODINE TARTRATE 4 MG: 4 CAPSULE, EXTENDED RELEASE ORAL at 08:13

## 2017-03-04 RX ADMIN — ENTACAPONE 200 MG: 200 TABLET, FILM COATED ORAL at 17:34

## 2017-03-04 RX ADMIN — SULFAMETHOXAZOLE AND TRIMETHOPRIM 1 TABLET: 800; 160 TABLET ORAL at 08:12

## 2017-03-04 RX ADMIN — MIRTAZAPINE 7.5 MG: 15 TABLET, FILM COATED ORAL at 21:32

## 2017-03-05 ENCOUNTER — APPOINTMENT (INPATIENT)
Dept: OCCUPATIONAL THERAPY | Facility: HOSPITAL | Age: 82
DRG: 480 | End: 2017-03-05
Payer: MEDICARE

## 2017-03-05 LAB
ALBUMIN SERPL ELPH-MCNC: 2.88 G/DL (ref 3.5–5)
ALBUMIN SERPL ELPH-MCNC: 53.3 % (ref 52–65)
ALPHA1 GLOB SERPL ELPH-MCNC: 0.44 G/DL (ref 0.1–0.4)
ALPHA1 GLOB SERPL ELPH-MCNC: 8.1 % (ref 2.5–5)
ALPHA2 GLOB SERPL ELPH-MCNC: 0.72 G/DL (ref 0.4–1.2)
ALPHA2 GLOB SERPL ELPH-MCNC: 13.3 % (ref 7–13)
BETA GLOB ABNORMAL SERPL ELPH-MCNC: 0.3 G/DL (ref 0.4–0.8)
BETA1 GLOB SERPL ELPH-MCNC: 5.6 % (ref 5–13)
BETA2 GLOB SERPL ELPH-MCNC: 5.9 % (ref 2–8)
BETA2+GAMMA GLOB SERPL ELPH-MCNC: 0.32 G/DL (ref 0.2–0.5)
GAMMA GLOB ABNORMAL SERPL ELPH-MCNC: 0.75 G/DL (ref 0.5–1.6)
GAMMA GLOB SERPL ELPH-MCNC: 13.8 % (ref 12–22)
GLUCOSE SERPL-MCNC: 108 MG/DL (ref 65–140)
GLUCOSE SERPL-MCNC: 127 MG/DL (ref 65–140)
GLUCOSE SERPL-MCNC: 129 MG/DL (ref 65–140)
GLUCOSE SERPL-MCNC: 53 MG/DL (ref 65–140)
GLUCOSE SERPL-MCNC: 84 MG/DL (ref 65–140)
GLUCOSE SERPL-MCNC: 88 MG/DL (ref 65–140)
GLUCOSE SERPL-MCNC: 96 MG/DL (ref 65–140)
IGG/ALB SER: 1.14 {RATIO} (ref 1.1–1.8)
PROT SERPL-MCNC: 5.4 G/DL (ref 6.4–8.2)

## 2017-03-05 PROCEDURE — 97530 THERAPEUTIC ACTIVITIES: CPT

## 2017-03-05 PROCEDURE — 82948 REAGENT STRIP/BLOOD GLUCOSE: CPT

## 2017-03-05 PROCEDURE — 97532 HB COGNITIVE SKILLS DEVELOPMENT: CPT

## 2017-03-05 RX ADMIN — CARBIDOPA AND LEVODOPA 2 TABLET: 25; 100 TABLET ORAL at 11:26

## 2017-03-05 RX ADMIN — MIRTAZAPINE 7.5 MG: 15 TABLET, FILM COATED ORAL at 21:42

## 2017-03-05 RX ADMIN — ENTACAPONE 200 MG: 200 TABLET, FILM COATED ORAL at 06:11

## 2017-03-05 RX ADMIN — CARBIDOPA AND LEVODOPA 2 TABLET: 25; 100 TABLET ORAL at 06:11

## 2017-03-05 RX ADMIN — Medication 1 TABLET: at 09:00

## 2017-03-05 RX ADMIN — ENTACAPONE 200 MG: 200 TABLET, FILM COATED ORAL at 11:25

## 2017-03-05 RX ADMIN — CARBIDOPA AND LEVODOPA 2 TABLET: 25; 100 TABLET ORAL at 21:42

## 2017-03-05 RX ADMIN — Medication 16 G: at 04:02

## 2017-03-05 RX ADMIN — CARBIDOPA AND LEVODOPA 2 TABLET: 25; 100 TABLET ORAL at 13:53

## 2017-03-05 RX ADMIN — CALCIUM CARBONATE 500 MG (1,250 MG)-VITAMIN D3 200 UNIT TABLET 1 TABLET: at 09:00

## 2017-03-05 RX ADMIN — SULFAMETHOXAZOLE AND TRIMETHOPRIM 1 TABLET: 800; 160 TABLET ORAL at 21:42

## 2017-03-05 RX ADMIN — ENTACAPONE 200 MG: 200 TABLET, FILM COATED ORAL at 13:55

## 2017-03-05 RX ADMIN — DOCUSATE SODIUM 100 MG: 100 CAPSULE, LIQUID FILLED ORAL at 09:00

## 2017-03-05 RX ADMIN — ENTACAPONE 200 MG: 200 TABLET, FILM COATED ORAL at 17:44

## 2017-03-05 RX ADMIN — Medication 1000 MG: at 09:01

## 2017-03-05 RX ADMIN — SULFAMETHOXAZOLE AND TRIMETHOPRIM 1 TABLET: 800; 160 TABLET ORAL at 09:01

## 2017-03-05 RX ADMIN — CARBIDOPA AND LEVODOPA 2 TABLET: 25; 100 TABLET ORAL at 17:44

## 2017-03-05 RX ADMIN — TOLTERODINE TARTRATE 4 MG: 4 CAPSULE, EXTENDED RELEASE ORAL at 09:01

## 2017-03-05 RX ADMIN — ENOXAPARIN SODIUM 40 MG: 40 INJECTION SUBCUTANEOUS at 09:00

## 2017-03-05 RX ADMIN — CYANOCOBALAMIN TAB 500 MCG 1000 MCG: 500 TAB at 10:21

## 2017-03-05 RX ADMIN — DOCUSATE SODIUM 100 MG: 100 CAPSULE, LIQUID FILLED ORAL at 17:44

## 2017-03-05 RX ADMIN — ENTACAPONE 200 MG: 200 TABLET, FILM COATED ORAL at 21:42

## 2017-03-06 ENCOUNTER — APPOINTMENT (INPATIENT)
Dept: PHYSICAL THERAPY | Facility: HOSPITAL | Age: 82
DRG: 480 | End: 2017-03-06
Payer: MEDICARE

## 2017-03-06 VITALS
DIASTOLIC BLOOD PRESSURE: 61 MMHG | OXYGEN SATURATION: 100 % | RESPIRATION RATE: 18 BRPM | HEART RATE: 61 BPM | TEMPERATURE: 98 F | HEIGHT: 64 IN | BODY MASS INDEX: 25.14 KG/M2 | SYSTOLIC BLOOD PRESSURE: 129 MMHG | WEIGHT: 147.27 LBS

## 2017-03-06 LAB
ANION GAP SERPL CALCULATED.3IONS-SCNC: 6 MMOL/L (ref 4–13)
BUN SERPL-MCNC: 31 MG/DL (ref 5–25)
CALCIUM SERPL-MCNC: 8.8 MG/DL (ref 8.3–10.1)
CHLORIDE SERPL-SCNC: 104 MMOL/L (ref 100–108)
CO2 SERPL-SCNC: 29 MMOL/L (ref 21–32)
CREAT SERPL-MCNC: 0.81 MG/DL (ref 0.6–1.3)
ERYTHROCYTE [DISTWIDTH] IN BLOOD BY AUTOMATED COUNT: 15.9 % (ref 11.6–15.1)
GFR SERPL CREATININE-BSD FRML MDRD: >60 ML/MIN/1.73SQ M
GLUCOSE SERPL-MCNC: 100 MG/DL (ref 65–140)
GLUCOSE SERPL-MCNC: 101 MG/DL (ref 65–140)
GLUCOSE SERPL-MCNC: 110 MG/DL (ref 65–140)
GLUCOSE SERPL-MCNC: 89 MG/DL (ref 65–140)
GLUCOSE SERPL-MCNC: 91 MG/DL (ref 65–140)
HCT VFR BLD AUTO: 30.8 % (ref 34.8–46.1)
HGB BLD-MCNC: 10.4 G/DL (ref 11.5–15.4)
MCH RBC QN AUTO: 32.7 PG (ref 26.8–34.3)
MCHC RBC AUTO-ENTMCNC: 33.8 G/DL (ref 31.4–37.4)
MCV RBC AUTO: 97 FL (ref 82–98)
PLATELET # BLD AUTO: 314 THOUSANDS/UL (ref 149–390)
PMV BLD AUTO: 9.9 FL (ref 8.9–12.7)
POTASSIUM SERPL-SCNC: 4.4 MMOL/L (ref 3.5–5.3)
RBC # BLD AUTO: 3.18 MILLION/UL (ref 3.81–5.12)
SODIUM SERPL-SCNC: 139 MMOL/L (ref 136–145)
WBC # BLD AUTO: 6.19 THOUSAND/UL (ref 4.31–10.16)

## 2017-03-06 PROCEDURE — 82948 REAGENT STRIP/BLOOD GLUCOSE: CPT

## 2017-03-06 PROCEDURE — 80048 BASIC METABOLIC PNL TOTAL CA: CPT | Performed by: INTERNAL MEDICINE

## 2017-03-06 PROCEDURE — 97530 THERAPEUTIC ACTIVITIES: CPT

## 2017-03-06 PROCEDURE — 85027 COMPLETE CBC AUTOMATED: CPT | Performed by: INTERNAL MEDICINE

## 2017-03-06 PROCEDURE — 97116 GAIT TRAINING THERAPY: CPT

## 2017-03-06 RX ORDER — SULFAMETHOXAZOLE AND TRIMETHOPRIM 800; 160 MG/1; MG/1
1 TABLET ORAL EVERY 12 HOURS SCHEDULED
Refills: 0
Start: 2017-03-06 | End: 2017-03-06

## 2017-03-06 RX ORDER — MIRTAZAPINE 15 MG/1
15 TABLET, FILM COATED ORAL
Qty: 30 TABLET | Refills: 0 | Status: SHIPPED | OUTPATIENT
Start: 2017-03-06 | End: 2019-08-27

## 2017-03-06 RX ORDER — SULFAMETHOXAZOLE AND TRIMETHOPRIM 800; 160 MG/1; MG/1
1 TABLET ORAL EVERY 12 HOURS SCHEDULED
Qty: 8 TABLET | Refills: 0 | Status: SHIPPED | OUTPATIENT
Start: 2017-03-06 | End: 2017-03-10

## 2017-03-06 RX ORDER — SULFAMETHOXAZOLE AND TRIMETHOPRIM 800; 160 MG/1; MG/1
1 TABLET ORAL EVERY 12 HOURS SCHEDULED
Status: DISCONTINUED | OUTPATIENT
Start: 2017-03-06 | End: 2017-03-06 | Stop reason: HOSPADM

## 2017-03-06 RX ORDER — MIRTAZAPINE 15 MG/1
15 TABLET, FILM COATED ORAL
Status: DISCONTINUED | OUTPATIENT
Start: 2017-03-06 | End: 2017-03-06 | Stop reason: HOSPADM

## 2017-03-06 RX ORDER — MIRTAZAPINE 15 MG/1
15 TABLET, FILM COATED ORAL
Qty: 30 TABLET | Refills: 0
Start: 2017-03-06 | End: 2017-03-06

## 2017-03-06 RX ORDER — DEXTROSE MONOHYDRATE 25 G/50ML
25 INJECTION, SOLUTION INTRAVENOUS AS NEEDED
Qty: 50 ML | Refills: 0
Start: 2017-03-06 | End: 2017-03-06

## 2017-03-06 RX ORDER — DEXTROSE MONOHYDRATE 25 G/50ML
25 INJECTION, SOLUTION INTRAVENOUS AS NEEDED
Qty: 50 ML | Refills: 0 | Status: ON HOLD | OUTPATIENT
Start: 2017-03-06 | End: 2018-12-21 | Stop reason: ALTCHOICE

## 2017-03-06 RX ADMIN — ENOXAPARIN SODIUM 40 MG: 40 INJECTION SUBCUTANEOUS at 10:11

## 2017-03-06 RX ADMIN — CARBIDOPA AND LEVODOPA 2 TABLET: 25; 100 TABLET ORAL at 14:12

## 2017-03-06 RX ADMIN — CALCIUM CARBONATE 500 MG (1,250 MG)-VITAMIN D3 200 UNIT TABLET 1 TABLET: at 10:07

## 2017-03-06 RX ADMIN — ENTACAPONE 200 MG: 200 TABLET, FILM COATED ORAL at 10:07

## 2017-03-06 RX ADMIN — CARBIDOPA AND LEVODOPA 2 TABLET: 25; 100 TABLET ORAL at 07:13

## 2017-03-06 RX ADMIN — CARBIDOPA AND LEVODOPA 2 TABLET: 25; 100 TABLET ORAL at 10:07

## 2017-03-06 RX ADMIN — ENTACAPONE 200 MG: 200 TABLET, FILM COATED ORAL at 17:46

## 2017-03-06 RX ADMIN — ENTACAPONE 200 MG: 200 TABLET, FILM COATED ORAL at 14:12

## 2017-03-06 RX ADMIN — Medication 1 TABLET: at 10:11

## 2017-03-06 RX ADMIN — Medication 1000 MG: at 10:07

## 2017-03-06 RX ADMIN — CARBIDOPA AND LEVODOPA 2 TABLET: 25; 100 TABLET ORAL at 17:46

## 2017-03-06 RX ADMIN — CYANOCOBALAMIN TAB 500 MCG 1000 MCG: 500 TAB at 10:06

## 2017-03-06 RX ADMIN — SULFAMETHOXAZOLE AND TRIMETHOPRIM 1 TABLET: 800; 160 TABLET ORAL at 12:03

## 2017-03-06 RX ADMIN — ENTACAPONE 200 MG: 200 TABLET, FILM COATED ORAL at 07:12

## 2017-03-06 RX ADMIN — TOLTERODINE TARTRATE 4 MG: 4 CAPSULE, EXTENDED RELEASE ORAL at 10:07

## 2017-03-10 ENCOUNTER — GENERIC CONVERSION - ENCOUNTER (OUTPATIENT)
Dept: OTHER | Facility: OTHER | Age: 82
End: 2017-03-10

## 2017-03-28 ENCOUNTER — HOSPITAL ENCOUNTER (OUTPATIENT)
Dept: RADIOLOGY | Facility: HOSPITAL | Age: 82
Discharge: HOME | End: 2017-03-28
Attending: ORTHOPAEDIC SURGERY
Payer: COMMERCIAL

## 2017-03-28 ENCOUNTER — ALLSCRIPTS OFFICE VISIT (OUTPATIENT)
Dept: OTHER | Facility: OTHER | Age: 82
End: 2017-03-28

## 2017-03-28 DIAGNOSIS — Z47.89 ENCOUNTER FOR OTHER ORTHOPEDIC AFTERCARE: ICD-10-CM

## 2017-03-28 DIAGNOSIS — M25.551 PAIN IN RIGHT HIP: ICD-10-CM

## 2017-03-28 PROCEDURE — 73502 X-RAY EXAM HIP UNI 2-3 VIEWS: CPT

## 2017-04-14 ENCOUNTER — ALLSCRIPTS OFFICE VISIT (OUTPATIENT)
Dept: OTHER | Facility: OTHER | Age: 82
End: 2017-04-14

## 2017-04-25 ENCOUNTER — HOSPITAL ENCOUNTER (OUTPATIENT)
Dept: RADIOLOGY | Facility: HOSPITAL | Age: 82
Discharge: HOME | End: 2017-04-25
Attending: ORTHOPAEDIC SURGERY
Payer: MEDICARE

## 2017-04-25 ENCOUNTER — ALLSCRIPTS OFFICE VISIT (OUTPATIENT)
Dept: OTHER | Facility: OTHER | Age: 82
End: 2017-04-25

## 2017-04-25 DIAGNOSIS — Z47.89 OTHER ORTHOPEDIC AFTERCARE(V54.89): ICD-10-CM

## 2017-04-25 DIAGNOSIS — M25.551 PAIN IN RIGHT HIP: ICD-10-CM

## 2017-04-25 PROCEDURE — 73502 X-RAY EXAM HIP UNI 2-3 VIEWS: CPT

## 2017-05-17 ENCOUNTER — HOSPITAL ENCOUNTER (EMERGENCY)
Facility: HOSPITAL | Age: 82
Discharge: HOME | End: 2017-05-17
Attending: EMERGENCY MEDICINE
Payer: MEDICARE

## 2017-05-17 ENCOUNTER — APPOINTMENT (EMERGENCY)
Dept: RADIOLOGY | Facility: HOSPITAL | Age: 82
End: 2017-05-17
Payer: MEDICARE

## 2017-05-17 VITALS
RESPIRATION RATE: 20 BRPM | TEMPERATURE: 97.5 F | WEIGHT: 130 LBS | HEART RATE: 54 BPM | BODY MASS INDEX: 22.2 KG/M2 | HEIGHT: 64 IN | OXYGEN SATURATION: 96 % | DIASTOLIC BLOOD PRESSURE: 74 MMHG | SYSTOLIC BLOOD PRESSURE: 178 MMHG

## 2017-05-17 DIAGNOSIS — N39.0 UTI (URINARY TRACT INFECTION): Primary | ICD-10-CM

## 2017-05-17 LAB
ALBUMIN SERPL BCP-MCNC: 3.2 G/DL (ref 3.5–5)
ALP SERPL-CCNC: 121 U/L (ref 46–116)
ALT SERPL W P-5'-P-CCNC: 8 U/L (ref 12–78)
ANION GAP SERPL CALCULATED.3IONS-SCNC: 4 MMOL/L (ref 4–13)
APTT PPP: 29 SECONDS (ref 23–35)
AST SERPL W P-5'-P-CCNC: 28 U/L (ref 5–45)
ATRIAL RATE: 51 BPM
BACTERIA UR QL AUTO: ABNORMAL /HPF
BASOPHILS # BLD AUTO: 0.02 THOUSANDS/ΜL (ref 0–0.1)
BASOPHILS NFR BLD AUTO: 0 % (ref 0–1)
BILIRUB SERPL-MCNC: 0.48 MG/DL (ref 0.2–1)
BILIRUB UR QL STRIP: NEGATIVE
BUN SERPL-MCNC: 19 MG/DL (ref 5–25)
CALCIUM SERPL-MCNC: 8.8 MG/DL (ref 8.3–10.1)
CHLORIDE SERPL-SCNC: 105 MMOL/L (ref 100–108)
CLARITY UR: CLEAR
CO2 SERPL-SCNC: 27 MMOL/L (ref 21–32)
COLOR UR: YELLOW
COLOR, POC: YELLOW
CREAT SERPL-MCNC: 0.71 MG/DL (ref 0.6–1.3)
EOSINOPHIL # BLD AUTO: 0.07 THOUSAND/ΜL (ref 0–0.61)
EOSINOPHIL NFR BLD AUTO: 1 % (ref 0–6)
ERYTHROCYTE [DISTWIDTH] IN BLOOD BY AUTOMATED COUNT: 14.8 % (ref 11.6–15.1)
GFR SERPL CREATININE-BSD FRML MDRD: >60 ML/MIN/1.73SQ M
GLUCOSE SERPL-MCNC: 103 MG/DL (ref 65–140)
GLUCOSE UR STRIP-MCNC: NEGATIVE MG/DL
HCT VFR BLD AUTO: 39.8 % (ref 34.8–46.1)
HGB BLD-MCNC: 13.1 G/DL (ref 11.5–15.4)
HGB UR QL STRIP.AUTO: NEGATIVE
HYALINE CASTS #/AREA URNS LPF: ABNORMAL /LPF
KETONES UR STRIP-MCNC: NEGATIVE MG/DL
LEUKOCYTE ESTERASE UR QL STRIP: ABNORMAL
LYMPHOCYTES # BLD AUTO: 1.86 THOUSANDS/ΜL (ref 0.6–4.47)
LYMPHOCYTES NFR BLD AUTO: 39 % (ref 14–44)
MCH RBC QN AUTO: 31.3 PG (ref 26.8–34.3)
MCHC RBC AUTO-ENTMCNC: 32.9 G/DL (ref 31.4–37.4)
MCV RBC AUTO: 95 FL (ref 82–98)
MONOCYTES # BLD AUTO: 0.27 THOUSAND/ΜL (ref 0.17–1.22)
MONOCYTES NFR BLD AUTO: 6 % (ref 4–12)
NEUTROPHILS # BLD AUTO: 2.6 THOUSANDS/ΜL (ref 1.85–7.62)
NEUTS SEG NFR BLD AUTO: 54 % (ref 43–75)
NITRITE UR QL STRIP: NEGATIVE
NON-SQ EPI CELLS URNS QL MICRO: ABNORMAL /HPF
NRBC BLD AUTO-RTO: 0 /100 WBCS
P AXIS: 50 DEGREES
PH UR STRIP.AUTO: 6.5 [PH] (ref 4.5–8)
PLATELET # BLD AUTO: 202 THOUSANDS/UL (ref 149–390)
PMV BLD AUTO: 11 FL (ref 8.9–12.7)
POTASSIUM SERPL-SCNC: 4.8 MMOL/L (ref 3.5–5.3)
PR INTERVAL: 146 MS
PROT SERPL-MCNC: 6.6 G/DL (ref 6.4–8.2)
PROT UR STRIP-MCNC: NEGATIVE MG/DL
QRS AXIS: 31 DEGREES
QRSD INTERVAL: 136 MS
QT INTERVAL: 468 MS
QTC INTERVAL: 431 MS
RBC # BLD AUTO: 4.18 MILLION/UL (ref 3.81–5.12)
RBC #/AREA URNS AUTO: ABNORMAL /HPF
SODIUM SERPL-SCNC: 136 MMOL/L (ref 136–145)
SP GR UR STRIP.AUTO: 1.01 (ref 1–1.03)
SPECIMEN SOURCE: NORMAL
SPECIMEN SOURCE: NORMAL
T WAVE AXIS: 43 DEGREES
TROPONIN I BLD-MCNC: 0.01 NG/ML (ref 0–0.08)
TROPONIN I BLD-MCNC: 0.01 NG/ML (ref 0–0.08)
UROBILINOGEN UR QL STRIP.AUTO: 0.2 E.U./DL
VENTRICULAR RATE: 51 BPM
WBC # BLD AUTO: 4.83 THOUSAND/UL (ref 4.31–10.16)
WBC #/AREA URNS AUTO: ABNORMAL /HPF

## 2017-05-17 PROCEDURE — 87077 CULTURE AEROBIC IDENTIFY: CPT

## 2017-05-17 PROCEDURE — 99285 EMERGENCY DEPT VISIT HI MDM: CPT

## 2017-05-17 PROCEDURE — 85025 COMPLETE CBC W/AUTO DIFF WBC: CPT

## 2017-05-17 PROCEDURE — 81001 URINALYSIS AUTO W/SCOPE: CPT

## 2017-05-17 PROCEDURE — 93005 ELECTROCARDIOGRAM TRACING: CPT

## 2017-05-17 PROCEDURE — 96361 HYDRATE IV INFUSION ADD-ON: CPT

## 2017-05-17 PROCEDURE — 36415 COLL VENOUS BLD VENIPUNCTURE: CPT

## 2017-05-17 PROCEDURE — 71020 HB CHEST X-RAY 2VW FRONTAL&LATL: CPT

## 2017-05-17 PROCEDURE — 87186 SC STD MICRODIL/AGAR DIL: CPT

## 2017-05-17 PROCEDURE — 87086 URINE CULTURE/COLONY COUNT: CPT

## 2017-05-17 PROCEDURE — 96360 HYDRATION IV INFUSION INIT: CPT

## 2017-05-17 PROCEDURE — 84484 ASSAY OF TROPONIN QUANT: CPT

## 2017-05-17 PROCEDURE — 85730 THROMBOPLASTIN TIME PARTIAL: CPT | Performed by: EMERGENCY MEDICINE

## 2017-05-17 PROCEDURE — 81002 URINALYSIS NONAUTO W/O SCOPE: CPT | Performed by: EMERGENCY MEDICINE

## 2017-05-17 PROCEDURE — 70450 CT HEAD/BRAIN W/O DYE: CPT

## 2017-05-17 PROCEDURE — 80053 COMPREHEN METABOLIC PANEL: CPT | Performed by: EMERGENCY MEDICINE

## 2017-05-17 RX ORDER — SULFAMETHOXAZOLE AND TRIMETHOPRIM 800; 160 MG/1; MG/1
1 TABLET ORAL 2 TIMES DAILY
Qty: 6 TABLET | Refills: 0 | Status: SHIPPED | OUTPATIENT
Start: 2017-05-17 | End: 2017-05-20

## 2017-05-17 RX ORDER — LISINOPRIL 5 MG/1
5 TABLET ORAL DAILY
COMMUNITY
End: 2018-11-26 | Stop reason: ALTCHOICE

## 2017-05-17 RX ADMIN — SODIUM CHLORIDE 1000 ML: 0.9 INJECTION, SOLUTION INTRAVENOUS at 12:10

## 2017-05-18 ENCOUNTER — ALLSCRIPTS OFFICE VISIT (OUTPATIENT)
Dept: OTHER | Facility: OTHER | Age: 82
End: 2017-05-18

## 2017-06-02 ENCOUNTER — APPOINTMENT (OUTPATIENT)
Dept: LAB | Facility: HOSPITAL | Age: 82
End: 2017-06-02
Attending: INTERNAL MEDICINE
Payer: MEDICARE

## 2017-06-02 ENCOUNTER — TRANSCRIBE ORDERS (OUTPATIENT)
Dept: LAB | Facility: HOSPITAL | Age: 82
End: 2017-06-02

## 2017-06-02 DIAGNOSIS — D55.1: ICD-10-CM

## 2017-06-02 DIAGNOSIS — D55.1: Primary | ICD-10-CM

## 2017-06-02 LAB
ERYTHROCYTE [DISTWIDTH] IN BLOOD BY AUTOMATED COUNT: 15.2 % (ref 11.6–15.1)
FERRITIN SERPL-MCNC: 328 NG/ML (ref 8–388)
FOLATE SERPL-MCNC: >20 NG/ML (ref 3.1–17.5)
HCT VFR BLD AUTO: 39.7 % (ref 34.8–46.1)
HCYS SERPL-SCNC: 10.9 UMOL/L (ref 3.7–11.2)
HGB BLD-MCNC: 12.9 G/DL (ref 11.5–15.4)
MCH RBC QN AUTO: 30.7 PG (ref 26.8–34.3)
MCHC RBC AUTO-ENTMCNC: 32.5 G/DL (ref 31.4–37.4)
MCV RBC AUTO: 95 FL (ref 82–98)
PLATELET # BLD AUTO: 211 THOUSANDS/UL (ref 149–390)
PMV BLD AUTO: 11.1 FL (ref 8.9–12.7)
RBC # BLD AUTO: 4.2 MILLION/UL (ref 3.81–5.12)
VIT B12 SERPL-MCNC: 803 PG/ML (ref 100–900)
WBC # BLD AUTO: 5.5 THOUSAND/UL (ref 4.31–10.16)

## 2017-06-02 PROCEDURE — 36415 COLL VENOUS BLD VENIPUNCTURE: CPT

## 2017-06-02 PROCEDURE — 82728 ASSAY OF FERRITIN: CPT

## 2017-06-02 PROCEDURE — 84207 ASSAY OF VITAMIN B-6: CPT

## 2017-06-02 PROCEDURE — 83090 ASSAY OF HOMOCYSTEINE: CPT

## 2017-06-02 PROCEDURE — 82607 VITAMIN B-12: CPT

## 2017-06-02 PROCEDURE — 82746 ASSAY OF FOLIC ACID SERUM: CPT

## 2017-06-02 PROCEDURE — 83918 ORGANIC ACIDS TOTAL QUANT: CPT

## 2017-06-02 PROCEDURE — 85027 COMPLETE CBC AUTOMATED: CPT

## 2017-06-05 LAB — VIT B6 SERPL-MCNC: 32 UG/L (ref 2–32.8)

## 2017-06-06 LAB
BACTERIA UR CULT: NORMAL
METHYLMALONATE SERPL-SCNC: 211 NMOL/L (ref 0–378)

## 2017-10-30 DIAGNOSIS — G20 PARKINSON'S DISEASE (HCC): ICD-10-CM

## 2017-11-21 ENCOUNTER — ALLSCRIPTS OFFICE VISIT (OUTPATIENT)
Dept: OTHER | Facility: OTHER | Age: 82
End: 2017-11-21

## 2017-11-22 NOTE — PROGRESS NOTES
Assessment    1  Parkinson's disease (332 0) (G20)    Plan  Parkinson's disease    · Follow-up visit in 4 Months Evaluation and Treatment  Follow-up with Francine Robles  in [place] as patient will be leaving to [facility] for the summer/ fall  Status: Hold For -Scheduling  Requested for: [id number]   Ordered;Parkinson's disease; Ordered By: Taylor Lopez Performed:  Due: 64UWA5735    Discussion/Summary  Discussion Summary:   Overall doing well with regards to Parkinson disease  No longer with hypotensive episodes  She has bradykinesia and postural instability  She would benefit from BIG therapy which she will be starting next month  Will remain on Stalevo with no change  Discussed upcoming formulations of levodopa as there is some off time first thing in the morning  Counseling Documentation With Imm: The patient, patient's family was counseled regarding patient and family education,-- impressions  Patient Guardian understands agrees: The treatment plan was reviewed with the patient/guardian  The patient/guardian understands and agrees with the treatment plan      Chief Complaint  Chief Complaint Free Text Note Form: Patient presents today for a neurological follow up for Dementia and also Parkinsons Disease      History of Present Illness  HPI: Ms Vlad Shirley is an 80year old woman with Parkinsonâs disease and dementia who presents for follow up  She is accompanied by her daughter  To review, she was diagnosed with Parkinsonâs disease in 2010  She was started on Sinemet and feels it helped but she is not sure what symptoms were helped  When she initially presented she was taking Sinemet 25/100 1 5 tabs at 7am, 11, 3pm, 7pm  She was never on any other medication for PD  She had been on Paxil for over 5 years and Wellbutrin for over 2 years   Prior swallow evaluation revealed evidence of mild pharyngeal dysphagia and it was recommended that she be on a moist soft food diet with pills given with sheyla  seen after ER visit 5/17/17 with dizziness and fluctuating BP  She had recently been placed on Lisinopril by her PCP  She was also found to have a UTI  She had staring spells in the past mainly noted while in the hospital in 2/2017 following her surgery for right hip fracture  At that time an EEG was performed which revealed intermittent right temporal delta activity with diffuse theta activity and slow posterior rhythm  Findings indicated right temporal focal cerebral dysfunction superimposed on moderate diffuse cerebral dysfunction  No clear cause of her staring spells and confusion was found at that time  remains on Stalevo 200 5 times a day (6am, 10am, 2pm, 6pm, 10pm)  Her blood pressure has stabilize  No more unresponsive episodes  Teddy did not agree with her  She is assisted with dressing and showering  She just returned from being in [facility] the past 5 months  While there she had help every morning  They are having someone come in each morning now here  She will be starting BIG soon at [facility]  She denies any changes in appetite  She denies dysphagia but her son states they are careful with not giving her big pieces of steak, etc          Review of Systems  Neurological ROS:  Constitutional: no fever, no chills, no recent weight gain, no recent weight loss, no complaints of feeling tired, no changes in appetite  HEENT: hearing loss  Cardiovascular:  no chest pain or pressure, no palpitations present, the heart rate was not rapid or irregular, no swelling in the arms or legs, no poor circulation  Respiratory:  no unusual or persistant cough, no shortness of breath with or without exertion  Gastrointestinal:  no nausea, no vomiting, no diarrhea, no abdominal pain, no changes in bowel habits, no melena, no loss of bowel control  Genitourinary: incontinence  Musculoskeletal:  no arthralgias, no myalgias, no immobility or loss of function, no head/neck/back pain, no pain while walking  Integumentary  no masses, no rash, no skin lesions, no livedo reticularis  Psychiatric:  no anxiety, no depression, no mood swings, no psychiatric hospitalizations, no sleep problems  Endocrine  no unusual weight loss or gain, no excessive urination, no excessive thirst, no hair loss or gain, no hot or cold intolerance, no menstrual period change or irregularity, no loss of sexual ability or drive, no erection difficulty, no nipple discharge  Hematologic/Lymphatic:  no unusual bleeding, no tendency for easy bruising, no clotting skin or lumps  Neurological General:  no headache, no nausea or vomiting, no lightheadedness, no convulsions, no blackouts, no syncope, no trauma, no photopsia, no increased sleepiness, no trouble falling asleep, no snoring, no awakening at night  Neurological Mental Status:  no confusion, no mood swings, no alteration or loss of consciousness, no difficulty expressing/understanding speech, no memory problems  Neurological Cranial Nerves:  no blurry or double vision, no loss of vision, no face drooping, no facial numbness or weakness, no taste or smell loss/changes, no hearing loss or ringing, no vertigo or dizziness, no dysphagia, no slurred speech  Neurological Motor findings include:  no tremor, no twitching, no cramping(pre/post exercise), no atrophy  Neurological Coordination: balance difficulties  Neurological Sensory:  no numbness, no pain, no tingling, does not fall when eyes closed or taking a shower  Neurological Gait: difficulty walking  ROS Reviewed:   ROS reviewed  Active Problems    1  Abnormal weight loss (783 21) (R63 4)   2  Acute exacerbation of chronic low back pain (724 2,338 19,338 29) (M54 5,G89 29)   3  Acute gastric ulcer (531 30) (K25 3)   4  Acute URI (465 9) (J06 9)   5  Aftercare following other surgery of musculoskeletal system (V58 78) (Z47 89)   6  Anxiety (300 00) (F41 9)   7  Back pain (724 5) (M54 9)   8   BPPV (benign paroxysmal positional vertigo) (386 11) (H81 10)   9  Candida esophagitis (112 84) (B37 81)   10  Chronic low back pain (724 2,338 29) (M54 5,G89 29)   11  Chronic pain syndrome (338 4) (G89 4)   12  Cognitive changes (799 59) (R41 89)   13  DDD (degenerative disc disease), lumbosacral (722 52) (M51 37)   14  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,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,F02 80)   15  Depression with anxiety (300 4) (F41 8)   16  Dextroscoliosis (737 39) (M41 80)   17  Dysphagia (787 20) (R13 10)   18  Encounter for routine gynecological examination (V72 31) (Z01 419)   19  Encounter for screening mammogram for malignant neoplasm of breast (V76 12)  (Z12 31)   20  Fluctuating blood pressure (796 4) (I99 8)   21  Flu-like symptoms (780 99) (R68 89)   22  Helicobacter pylori (H  pylori) infection (041 86) (A04 8)   23  Intertrochanteric fracture of right hip, closed, with routine healing, subsequent encounter  (V54 13) (S72 141D)   24  Parkinson's disease (332 0) (G20)   25  Postmenopausal Atrophic Vaginitis (627 3)   26  Post-traumatic arthritis of left ankle (716 17) (M19 172)   27  Right hip pain (719 45) (M25 551)   28  Severe scoliosis (737 30) (M41 9)   29  Spondylolisthesis at L4-L5 level (756 12) (M43 16)   30  Transient alteration of awareness (780 02) (R40 4)   31  Visit for screening mammogram (V76 12) (Z12 31)    Past Medical History  1  History of Birth History   2  History of Depression (311) (F32 9)   3  History of hypercholesterolemia (V12 29) (Z86 39)   4  History of Parkinson's disease (V12 49) (Z86 69)   5  History of Raynauds disease (443 0) (I73 00)   6  History of Reported Pap Smear  Active Problems And Past Medical History Reviewed: The active problems and past medical history were reviewed and updated today  Surgical History  1  History of Arthroscopy Knee Left   2  History of  Section   3  History of Cholecystectomy   4  History of Knee Surgery  Surgical History Reviewed:    The surgical history was reviewed and updated today  Family History  Mother    1  No pertinent family history    Social History     · Denied: History of Alcohol Use (History)   · Being A Social Drinker   · Denied: History of Home Environment Domestic Violence   · Never A Smoker  Social History Reviewed: The social history was reviewed and updated today  Current Meds   1  Acetaminophen 325 MG Oral Tablet; PRN; Therapy: (Recorded:14Apr2017) to Recorded   2  Centrum TABS; TAKE 1 TABLET DAILY; Therapy: (Recorded:14Apr2017) to Recorded   3  Dulcolax TBEC; PRN; Therapy: (Recorded:14Apr2017) to Recorded   4  Glucosamine Sulfate 1000 MG Oral Capsule; take 1 capsule daily; Therapy: (Recorded:26May2016) to Recorded   5  Mirtazapine 15 MG Oral Tablet; Take 1 tablet daily; Therapy: (Recorded:14Apr2017) to Recorded   6  Multi-Vitamin TABS; TAKE 1 TABLET DAILY; Therapy: (Recorded:26May2016) to Recorded   7  Oscal 500/200 D-3 TABS; Take 1 tablet daily; Therapy: (Recorded:26May2016) to Recorded   8  Senna-Tabs TABS; TAKE 2 TABLET Bedtime; Therapy: (Recorded:14Apr2017) to Recorded   9  Stalevo 200 -200 MG Oral Tablet; TAKE 1 TABLET 5 TIMES DAILY; Therapy: 06YOY2925 to ([phone] 97 26)  Requested for: 53Bjb0038; Last Rx:04Ojg7979 Ordered   10  Tylenol TABS; TAKE 1 TO 2 TABLETS EVERY 6 HOURS AS NEEDED; Therapy: (Recorded:26May2016) to Recorded   11  VESIcare 5 MG Oral Tablet; Take 1 tablet daily; Therapy: (Recorded:26May2016) to Recorded   12  Vitamin B-12 TABS; TAKE 1 TABLET DAILY; Therapy: (Recorded:26May2016) to Recorded   13  Vitamin D TABS; Take 1 tablet daily; Therapy: (Recorded:26May2016) to Recorded  Medication List Reviewed: The medication list was reviewed and updated today  Allergies    1   Penicillins    Vitals  Signs   Recorded: 53FCV5926 57:91OW   Systolic: 98, LUE, Standing  Diastolic: 64, LUE, Standing  Recorded: 21Nov2017 12:09PM   Respiration: 16  Systolic: 005, LUE, Sitting  Diastolic: 64, LUE, Sitting  Height: 5 ft 3 in  Weight: 114 lb 4 8 oz  BMI Calculated: 20 25  BSA Calculated: 1 52    Physical Exam   Constitutional  General appearance: Abnormal  -- (moderate hypomimia 2, moderate hypophonia 2  Very pleasant and talkative)  Eyes  Ophthalmoscopic examination: Vision is grossly normal  Gross visual field testing by confrontation shows no abnormalities  EOMI in both eyes  Conjunctivae clear  Eyelids normal palpebral fissures equal  Orbits exhibit normal position  No discharge from the eyes  PERRL  Bilateral optic discs: not visualized  Musculoskeletal  Gait and station: Abnormal  -- (Arises using her hands 2  Walks with shortened stride more on the left 2  No freezing or festination  Spontaneous retropulsion)  Muscle strength: Normal strength throughout  Muscle tone: Abnormal  -- (RUE 0, LUE 1, RLE 0, LLE 1, neck 1)  Involuntary movements: Abnormal involuntary movements were observed  -- (Mild to moderate bradykinesia on FT 2,2 with decrement more on the left, HG 0,0, JOSE 1,1, heel taps 2,2  No action tremor  No resting tremor  right  No dystonia or dyskinesias)  Neurologic  Orientation to person, place, and time: Normal    Recent and remote memory: Abnormal    Attention span and concentration: Abnormal    Language: Abnormal  -- (MOCA 1/20/15 â 12/30; 1/5/15 â 14/30: 8 (4/14/17))  2nd cranial nerve: Normal    3rd, 4th, and 6th cranial nerves: Normal    5th cranial nerve: Normal    7th cranial nerve: Normal    8th cranial nerve: Normal    9th cranial nerve: Normal    11th cranial nerve: Normal    12th cranial nerve: Normal    Sensation: Normal    Reflexes: Abnormal  -- (positive glabellar) palmomental reflex absent bilaterally    Mood and affect: Normal        Signatures   Electronically signed by : Marissa Silverman MD; Nov 21 2017 12:49PM EST                       (Author)

## 2017-11-24 ENCOUNTER — GENERIC CONVERSION - ENCOUNTER (OUTPATIENT)
Dept: OTHER | Facility: OTHER | Age: 82
End: 2017-11-24

## 2017-12-04 ENCOUNTER — APPOINTMENT (OUTPATIENT)
Dept: PHYSICAL THERAPY | Facility: CLINIC | Age: 82
End: 2017-12-04
Payer: MEDICARE

## 2017-12-04 PROCEDURE — 97162 PT EVAL MOD COMPLEX 30 MIN: CPT

## 2017-12-04 PROCEDURE — G8978 MOBILITY CURRENT STATUS: HCPCS

## 2017-12-04 PROCEDURE — G8979 MOBILITY GOAL STATUS: HCPCS

## 2017-12-05 ENCOUNTER — APPOINTMENT (OUTPATIENT)
Dept: PHYSICAL THERAPY | Facility: CLINIC | Age: 82
End: 2017-12-05
Payer: MEDICARE

## 2017-12-06 ENCOUNTER — APPOINTMENT (OUTPATIENT)
Dept: PHYSICAL THERAPY | Facility: CLINIC | Age: 82
End: 2017-12-06
Payer: MEDICARE

## 2017-12-06 PROCEDURE — 97112 NEUROMUSCULAR REEDUCATION: CPT

## 2017-12-07 ENCOUNTER — APPOINTMENT (OUTPATIENT)
Dept: PHYSICAL THERAPY | Facility: CLINIC | Age: 82
End: 2017-12-07
Payer: MEDICARE

## 2017-12-07 ENCOUNTER — APPOINTMENT (OUTPATIENT)
Dept: LAB | Facility: HOSPITAL | Age: 82
End: 2017-12-07
Attending: INTERNAL MEDICINE
Payer: MEDICARE

## 2017-12-07 ENCOUNTER — TRANSCRIBE ORDERS (OUTPATIENT)
Dept: LAB | Facility: HOSPITAL | Age: 82
End: 2017-12-07

## 2017-12-07 DIAGNOSIS — I10 ESSENTIAL HYPERTENSION, MALIGNANT: ICD-10-CM

## 2017-12-07 DIAGNOSIS — I10 ESSENTIAL HYPERTENSION, MALIGNANT: Primary | ICD-10-CM

## 2017-12-07 DIAGNOSIS — D68.52 HETEROZYGOUS FOR PROTHROMBIN G20210A MUTATION (HCC): ICD-10-CM

## 2017-12-07 LAB
ALBUMIN SERPL BCP-MCNC: 3.5 G/DL (ref 3.5–5)
ALP SERPL-CCNC: 97 U/L (ref 46–116)
ALT SERPL W P-5'-P-CCNC: 7 U/L (ref 12–78)
ANION GAP SERPL CALCULATED.3IONS-SCNC: 4 MMOL/L (ref 4–13)
AST SERPL W P-5'-P-CCNC: 18 U/L (ref 5–45)
BILIRUB SERPL-MCNC: 0.47 MG/DL (ref 0.2–1)
BUN SERPL-MCNC: 28 MG/DL (ref 5–25)
CALCIUM SERPL-MCNC: 9.2 MG/DL (ref 8.3–10.1)
CHLORIDE SERPL-SCNC: 102 MMOL/L (ref 100–108)
CO2 SERPL-SCNC: 32 MMOL/L (ref 21–32)
CREAT SERPL-MCNC: 0.68 MG/DL (ref 0.6–1.3)
ERYTHROCYTE [DISTWIDTH] IN BLOOD BY AUTOMATED COUNT: 14 % (ref 11.6–15.1)
GFR SERPL CREATININE-BSD FRML MDRD: 79 ML/MIN/1.73SQ M
GLUCOSE P FAST SERPL-MCNC: 86 MG/DL (ref 65–99)
HCT VFR BLD AUTO: 45.6 % (ref 34.8–46.1)
HGB BLD-MCNC: 15 G/DL (ref 11.5–15.4)
MCH RBC QN AUTO: 31.7 PG (ref 26.8–34.3)
MCHC RBC AUTO-ENTMCNC: 32.9 G/DL (ref 31.4–37.4)
MCV RBC AUTO: 96 FL (ref 82–98)
PLATELET # BLD AUTO: 198 THOUSANDS/UL (ref 149–390)
PMV BLD AUTO: 11.9 FL (ref 8.9–12.7)
POTASSIUM SERPL-SCNC: 4.2 MMOL/L (ref 3.5–5.3)
PROT SERPL-MCNC: 7.3 G/DL (ref 6.4–8.2)
RBC # BLD AUTO: 4.73 MILLION/UL (ref 3.81–5.12)
SODIUM SERPL-SCNC: 138 MMOL/L (ref 136–145)
T4 FREE SERPL-MCNC: 1.2 NG/DL (ref 0.76–1.46)
TSH SERPL DL<=0.05 MIU/L-ACNC: 1.97 UIU/ML (ref 0.36–3.74)
WBC # BLD AUTO: 3.25 THOUSAND/UL (ref 4.31–10.16)

## 2017-12-07 PROCEDURE — 36415 COLL VENOUS BLD VENIPUNCTURE: CPT

## 2017-12-07 PROCEDURE — 84439 ASSAY OF FREE THYROXINE: CPT

## 2017-12-07 PROCEDURE — 84443 ASSAY THYROID STIM HORMONE: CPT

## 2017-12-07 PROCEDURE — 85027 COMPLETE CBC AUTOMATED: CPT

## 2017-12-07 PROCEDURE — 80053 COMPREHEN METABOLIC PANEL: CPT

## 2017-12-08 ENCOUNTER — APPOINTMENT (OUTPATIENT)
Dept: PHYSICAL THERAPY | Facility: CLINIC | Age: 82
End: 2017-12-08
Payer: MEDICARE

## 2017-12-11 ENCOUNTER — APPOINTMENT (OUTPATIENT)
Dept: PHYSICAL THERAPY | Facility: CLINIC | Age: 82
End: 2017-12-11
Payer: MEDICARE

## 2017-12-11 PROCEDURE — 97112 NEUROMUSCULAR REEDUCATION: CPT

## 2017-12-11 PROCEDURE — 97116 GAIT TRAINING THERAPY: CPT

## 2017-12-13 ENCOUNTER — APPOINTMENT (OUTPATIENT)
Dept: PHYSICAL THERAPY | Facility: CLINIC | Age: 82
End: 2017-12-13
Payer: MEDICARE

## 2017-12-13 PROCEDURE — 97112 NEUROMUSCULAR REEDUCATION: CPT

## 2017-12-13 PROCEDURE — 97116 GAIT TRAINING THERAPY: CPT

## 2017-12-14 ENCOUNTER — APPOINTMENT (OUTPATIENT)
Dept: PHYSICAL THERAPY | Facility: CLINIC | Age: 82
End: 2017-12-14
Payer: MEDICARE

## 2017-12-15 ENCOUNTER — APPOINTMENT (OUTPATIENT)
Dept: PHYSICAL THERAPY | Facility: CLINIC | Age: 82
End: 2017-12-15
Payer: MEDICARE

## 2017-12-15 PROCEDURE — 97112 NEUROMUSCULAR REEDUCATION: CPT

## 2017-12-15 PROCEDURE — 97116 GAIT TRAINING THERAPY: CPT

## 2017-12-18 ENCOUNTER — APPOINTMENT (OUTPATIENT)
Dept: PHYSICAL THERAPY | Facility: CLINIC | Age: 82
End: 2017-12-18
Payer: MEDICARE

## 2017-12-18 PROCEDURE — 97112 NEUROMUSCULAR REEDUCATION: CPT

## 2017-12-18 PROCEDURE — 97116 GAIT TRAINING THERAPY: CPT

## 2017-12-20 ENCOUNTER — APPOINTMENT (OUTPATIENT)
Dept: PHYSICAL THERAPY | Facility: CLINIC | Age: 82
End: 2017-12-20
Payer: MEDICARE

## 2017-12-20 PROCEDURE — 97112 NEUROMUSCULAR REEDUCATION: CPT

## 2017-12-20 PROCEDURE — 97116 GAIT TRAINING THERAPY: CPT

## 2017-12-21 ENCOUNTER — APPOINTMENT (OUTPATIENT)
Dept: PHYSICAL THERAPY | Facility: CLINIC | Age: 82
End: 2017-12-21
Payer: MEDICARE

## 2017-12-21 PROCEDURE — 97112 NEUROMUSCULAR REEDUCATION: CPT

## 2017-12-21 PROCEDURE — 97116 GAIT TRAINING THERAPY: CPT

## 2017-12-22 ENCOUNTER — APPOINTMENT (OUTPATIENT)
Dept: PHYSICAL THERAPY | Facility: CLINIC | Age: 82
End: 2017-12-22
Payer: MEDICARE

## 2017-12-22 PROCEDURE — 97116 GAIT TRAINING THERAPY: CPT

## 2017-12-22 PROCEDURE — 97112 NEUROMUSCULAR REEDUCATION: CPT

## 2017-12-27 ENCOUNTER — APPOINTMENT (OUTPATIENT)
Dept: PHYSICAL THERAPY | Facility: CLINIC | Age: 82
End: 2017-12-27
Payer: MEDICARE

## 2017-12-27 PROCEDURE — G8978 MOBILITY CURRENT STATUS: HCPCS

## 2017-12-27 PROCEDURE — 97112 NEUROMUSCULAR REEDUCATION: CPT

## 2017-12-27 PROCEDURE — 97530 THERAPEUTIC ACTIVITIES: CPT

## 2017-12-27 PROCEDURE — 97116 GAIT TRAINING THERAPY: CPT

## 2017-12-27 PROCEDURE — G8979 MOBILITY GOAL STATUS: HCPCS

## 2017-12-28 ENCOUNTER — APPOINTMENT (OUTPATIENT)
Dept: PHYSICAL THERAPY | Facility: CLINIC | Age: 82
End: 2017-12-28
Payer: MEDICARE

## 2017-12-28 PROCEDURE — 97116 GAIT TRAINING THERAPY: CPT

## 2017-12-28 PROCEDURE — 97112 NEUROMUSCULAR REEDUCATION: CPT

## 2017-12-29 ENCOUNTER — APPOINTMENT (OUTPATIENT)
Dept: PHYSICAL THERAPY | Facility: CLINIC | Age: 82
End: 2017-12-29
Payer: MEDICARE

## 2017-12-29 PROCEDURE — 97116 GAIT TRAINING THERAPY: CPT

## 2017-12-29 PROCEDURE — 97112 NEUROMUSCULAR REEDUCATION: CPT

## 2018-01-02 ENCOUNTER — APPOINTMENT (OUTPATIENT)
Dept: PHYSICAL THERAPY | Facility: CLINIC | Age: 83
End: 2018-01-02
Payer: MEDICARE

## 2018-01-02 PROCEDURE — 97112 NEUROMUSCULAR REEDUCATION: CPT

## 2018-01-02 PROCEDURE — 97116 GAIT TRAINING THERAPY: CPT

## 2018-01-03 ENCOUNTER — APPOINTMENT (OUTPATIENT)
Dept: PHYSICAL THERAPY | Facility: CLINIC | Age: 83
End: 2018-01-03
Payer: MEDICARE

## 2018-01-03 PROCEDURE — 97112 NEUROMUSCULAR REEDUCATION: CPT

## 2018-01-03 PROCEDURE — 97116 GAIT TRAINING THERAPY: CPT

## 2018-01-08 ENCOUNTER — APPOINTMENT (OUTPATIENT)
Dept: PHYSICAL THERAPY | Facility: CLINIC | Age: 83
End: 2018-01-08
Payer: MEDICARE

## 2018-01-08 PROCEDURE — 97112 NEUROMUSCULAR REEDUCATION: CPT

## 2018-01-08 PROCEDURE — 97116 GAIT TRAINING THERAPY: CPT

## 2018-01-10 ENCOUNTER — APPOINTMENT (OUTPATIENT)
Dept: PHYSICAL THERAPY | Facility: CLINIC | Age: 83
End: 2018-01-10
Payer: MEDICARE

## 2018-01-10 PROCEDURE — G8979 MOBILITY GOAL STATUS: HCPCS

## 2018-01-10 PROCEDURE — 97112 NEUROMUSCULAR REEDUCATION: CPT

## 2018-01-10 PROCEDURE — G8980 MOBILITY D/C STATUS: HCPCS

## 2018-01-12 VITALS
HEIGHT: 63 IN | DIASTOLIC BLOOD PRESSURE: 67 MMHG | HEART RATE: 68 BPM | WEIGHT: 128 LBS | SYSTOLIC BLOOD PRESSURE: 128 MMHG | BODY MASS INDEX: 22.68 KG/M2

## 2018-01-12 VITALS — SYSTOLIC BLOOD PRESSURE: 170 MMHG | RESPIRATION RATE: 18 BRPM | HEART RATE: 55 BPM | DIASTOLIC BLOOD PRESSURE: 60 MMHG

## 2018-01-12 NOTE — MISCELLANEOUS
Message   Recorded as Task   Date: 07/07/2016 02:57 PM, Created By: Devora Navarro   Task Name: Follow Up   Assigned To: Devora Navarro   Regarding Patient: Erin Zaidi, Status: Active   Comment:    Amrit,Jully - 07 Jul 2016 2:57 PM     TASK Shannan Cardenas (daughter) reports patient received about 40% relief from her procedure  Casper Carrillo states that her mom's pain is mostly at night when she is lying down and or waking up in the morning  Patient does take Baclofen at night which helps alleviate that pain and when she wakes up in the morning  Patient is traveling to [place] and would like a 3month supply of Baclofen?   Patient will also have to cancel her f/u appt with you on  7/26/16 since [place] be out of the [place]  She will f/u  when she returns in 3 months  Her pain level toda is a zero  S/p LESI L5-S1 done 7/1/16 by Dr Jose Blanton  [facility] Jul 2016 3:13 PM     TASK REPLIED TO: Previously Assigned To [facility] [facility] 90 day supply of baclofen to pharmacy  Please change follow up appointment to when patient returns  AmritJully - 08 Jul 2016 8:52 AM     TASK EDITED  Paz Siddiqui (daughter) was notified that Dr Jose Blanton called in a 90day supple of Baclofen for Melissa  She was also instructed to call our office to schedule a f/u when she returns from [place]  Active Problems    1  Abnormal weight loss (783 21) (R63 4)   2  Acute exacerbation of chronic low back pain (724 2,338 19,338 29) (M54 5,G89 29)   3  Acute gastric ulcer (531 30) (K25 3)   4  Anxiety (300 00) (F41 9)   5  Back pain (724 5) (M54 9)   6  Candida esophagitis (112 84) (B37 81)   7  Chronic low back pain (724 2,338 29) (M54 5,G89 29)   8  Chronic pain syndrome (338 4) (G89 4)   9  Cognitive changes (799 59) (R41 89)   10  DDD (degenerative disc disease), lumbosacral (722 52) (M51 37)   11  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,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,F02 80)   12  Depression with anxiety (300 4) (F41 8)   13   Dextroscoliosis (737 39) (M41 80)   14  Dysphagia (787 20) (R13 10)   15  Encounter for routine gynecological examination (V72 31) (Z01 419)   16  Encounter for screening mammogram for malignant neoplasm of breast (V76 12)    (Z12 31)   17  Helicobacter pylori (H  pylori) infection (041 86) (A04 8)   18  Parkinson's disease (332 0) (G20)   19  Postmenopausal Atrophic Vaginitis (627 3)   20  Severe scoliosis (737 30) (M41 9)   21  Spondylolisthesis at L4-L5 level (756 12) (M43 16)   22  Visit for screening mammogram (V76 12) (Z12 31)    Current Meds   1  Baclofen 10 MG Oral Tablet; take 1 tablet bedtime prn muscle spasms; Therapy: 98XBZ0793 to (Evaluate:05Oct2016)  Requested for: 69NSO9415; Last   Rx:17Aof1482 Ordered   2  Carbidopa-Levodopa  MG Oral Tablet; TAKE 2 TABLET 4 TIMES DAILY Recorded   3  Glucosamine Sulfate 1000 MG Oral Capsule; take 1 capsule daily; Therapy: (Recorded:26May2016) to Recorded   4  Mirtazapine 15 MG Oral Tablet; Take 1 tablet daily; Therapy: (Recorded:26May2016) to Recorded   5  Multi-Vitamin TABS; TAKE 1 TABLET DAILY; Therapy: (Recorded:26May2016) to Recorded   6  Oscal 500/200 D-3 TABS; Take 1 tablet daily; Therapy: (Recorded:26May2016) to Recorded   7  Stalevo 200 -200 MG Oral Tablet (Carbidopa-Levodopa-Entacapone); TAKE 1   TABLET 5 TIMES DAILY; Therapy: 51VGY4421 to (Evaluate:56Wgk5952)  Requested for: 03Scl0013; Last   Rx:60Uji5808 Ordered   8  Tylenol TABS; TAKE 1 TO 2 TABLETS EVERY 6 HOURS AS NEEDED; Therapy: (Recorded:26May2016) to Recorded   9  VESIcare 5 MG Oral Tablet; Take 1 tablet daily; Therapy: (Recorded:26May2016) to Recorded   10  Vitamin B-12 TABS; TAKE 1 TABLET DAILY; Therapy: (Recorded:26May2016) to Recorded   11  Vitamin D TABS; Take 1 tablet daily; Therapy: (Recorded:26May2016) to Recorded    Allergies    1   Penicillins    Signatures   Electronically signed by : Diane Marsh MA; Jul 8 2016  8:52AM EST                       (Author)

## 2018-01-12 NOTE — PROGRESS NOTES
Assessment    1  Acute gastric ulcer (531 30) (K25 3)    Plan  Acute gastric ulcer    · EGD; Status:Hold For - Scheduling; Requested OIZ:88PJR2163;    Perform:[facility]; ODU:12WMH7239;NDDZOCL; For:Acute gastric ulcer; Ordered By:Dean Pollard;  Postmenopausal Atrophic Vaginitis    · Estrace 0 1 MG/GM Vaginal Cream   Rx By: Navi Jennings; Dispense: 0 Days ; #:1 GM; Refill: 5; For: Postmenopausal Atrophic Vaginitis; BAYLEE = N; Sent To: [facility] [address]; Last Updated By: Lucy Sanford; 1/26/2016 3:12:03 PM    Discussion/Summary  Discussion Summary: This is a pleasant 49-year-old female with a persistent gastric ulcer  Biopsies on her repeat endoscopy were negative for H  pylori  #1 Persistent gastric ulcer: We'll repeat an upper endoscopy and further evaluate for other signs of her unintentional weight loss  #2 weight loss: I've encouraged her to take boost or ensure on a daily basis  If she doesn't GAIN weight or continues to lose weight and consider further evaluation with imaging studies  Chief Complaint  Chief Complaint Free Text Note Form: Follow-up      History of Present Illness  HPI: This is a pleasant 49-year-old female who had seen in last spring when she had an acute gastric ulcer, she had continued ulceration on a follow-up endoscopy  Since her last visit she patient had traveled to [facility]  She is doing relatively well that she has incidentally lost 10-12 pounds  She reports a somewhat decreased appetite and some increasing fatigue  She reports that when she eats, she can eat well, she denies any nausea, vomiting, heartburn, dysphagia, melanoma, rectal bleeding  She has some mild change in her bowel movements  She presents today with her son  As you know she has Parkinson's disease and is being currently evaluated for some of her oral pharyngeal dysphagia  Review of Systems  Complete-Female GI Adult: Other Symptoms: The remainder of the ten ROS was negative     ROS Reviewed:   ROS reviewed  Active Problems    1  Acute gastric ulcer (531 30) (K25 3)   2  Anxiety (300 00) (F41 9)   3  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,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,F02 80)   4  Depression with anxiety (300 4) (F41 8)   5  Encounter for routine gynecological examination (V72 31) (Z01 419)   6  Encounter for screening mammogram for malignant neoplasm of breast (V76 12)   (Z12 31)   7  Helicobacter pylori (H  pylori) infection (041 86) (B96 81)   8  Parkinson's disease (332 0) (G20)   9  Postmenopausal Atrophic Vaginitis (627 3)   10  Visit for screening mammogram (V76 12) (Z12 31)    Past Medical History    1  History of Birth History   2  History of Depression (311) (F32 9)   3  History of hypercholesterolemia (V12 29) (Z86 39)   4  History of Parkinson's disease (V12 49) (Z86 69)   5  History of Reported Pap Smear  Active Problems And Past Medical History Reviewed: The active problems and past medical history were reviewed and updated today  Surgical History    1  History of Arthroscopy Knee Left   2  History of  Section   3  History of Cholecystectomy   4  History of Knee Surgery  Surgical History Reviewed: The surgical history was reviewed and updated today  Social History    · Denied: History of Alcohol Use (History)   · Being A Social Drinker   · Denied: History of Home Environment Domestic Violence   · Never A Smoker    Current Meds   1  Estrace 0 1 MG/GM Vaginal Cream; 1/4 applicator every other wk; Therapy: 03KAO0605 to (Last VX:14VDG4959)  Requested for: 34JXL2651 Ordered   2  O-Arturo-D TABS; Therapy: (Recorded:2014) to Recorded   3  PARoxetine HCl - 30 MG Oral Tablet; TAKE 1 TABLET DAILY; Therapy: (Recorded:2015) to Recorded   4  Stalevo 200 -200 MG Oral Tablet; TAKE 1 TABLET 5 TIMES DAILY; Therapy: 67ZYP3766 to (Holli Severance)  Requested for: 34RRP8124; Last   Rx:92Fii1009 Ordered   5  Stool Softener TABS;    Therapy: (Recorded:92Eid9429) to Recorded   6  Tylenol TABS; Therapy: (Recorded:90Kbf0506) to Recorded   7  Vitamin B-12 TABS; Therapy: (Recorded:20Mar2014) to Recorded   8  Vitamin D TABS; Therapy: (Recorded:20Mar2014) to Recorded    Allergies    1  Penicillins    Vitals  Vital Signs [Data Includes: Current Encounter]    Recorded: 66BYD5165 03:20PM   Heart Rate 76   Systolic 921, LUE, Sitting   Diastolic 56, LUE, Sitting   BP Comments manual b/p   Height 5 ft 3 in   Weight 130 lb 2 oz   BMI Calculated 23 05   BSA Calculated 1 61     Physical Exam   Gen  : Thin, elderly female,, no acute distress  HEENT: Anicteric, moist mucous membranes, no cervical or supraclavicular lymphadenopathy  Cardiovascular: Regular rate and rhythm, no murmurs rubs or gallops  Pulmonary: Clear to auscultation bilaterally  Abdomen: Soft, nontender, nondistended  No hepatosplenomegaly  Bowel sounds present and regular  Extremities: No cyanosis, clubbing, or edema,  Skin: No rashes  Neuro: Alert, awake, oriented x3         Health Management  Acute gastric ulcer   EGD; every 6 months; Last 35YWT0974; Next Due: 76BLJ2362;  Overdue    Future Appointments    Date/Time Provider Specialty Site   03/23/2016 09:00 AM Luz Marina Brand [facility]   02/03/2016 09:45 AM [facility]     Signatures   Electronically signed by : KARLO Flynn ; Jan 26 2016  3:50PM EST                       (Author)

## 2018-01-12 NOTE — RESULT NOTES
Message      Please inform patient that the polyps removed were hyperplastic polyps  There was also 1 tubular adenoma  She does not need a repeat colonoscopy unless there is some clinical change  I would like to see her back in the office for further follow-up or as needed  There was no obvious cause for her weight loss on her upper endoscopy or colonoscopy  It is possible that some of her weight loss may be due to her underlying Parkinson's disease or medication side effects  Verified Results  (1) TISSUE EXAM [id number] 08:43AM Genaro Schrader     Test Name Result Flag Reference   LAB AP CASE REPORT (Report)     Surgical Pathology Report             Case: [id number]                   Authorizing Provider: Naida Fry MD      Collected:      03/18/2016 0843        Ordering Location:   [facility]    Received:      03/18/2016 [address] [facility] Endoscopy                               Pathologist:      Karri Kirkpatrick MD                                Specimens:  A) - Polyp, Colorectal, transverse polyp                                B) - Polyp, Colorectal, sigmoid polyp   LAB AP FINAL DIAGNOSIS (Report)     A  Polyp, Colorectal, transverse polyp ,colonoscopy:   -Benign redundant colonic mucosa with focal hyperplastic surface change   suggestive of early/incipient hyperplastic polyp  B  Polyp, Colorectal, sigmoid polyp:   -One tubular adenoma  -Fragments of hyperplastic polyp   -No evidence of high grade dysplasia or malignancy seen  LAB AP NOTE      Interpretation performed at [facility], [address] [place]   [id number]   LAB AP SURGICAL ADDITIONAL INFORMATION (Report)     These tests were developed and their performance characteristics   determined by [address] [place] [address] Specialty Laboratory or [place]  They may not be cleared or approved by the [place]  Food and   Drug Administration  The FDA has determined that such clearance or   approval is not necessary  These tests are used for clinical purposes  They should not be regarded as investigational or for research  This   laboratory has been approved by [place] [address], designated as a high-complexity   laboratory and is qualified to perform these tests  LAB AP GROSS DESCRIPTION (Report)     A  The specimen is received in formalin, labeled with the patient's name   and hospital number, and is designated transverse polyp  The specimen   consists of a single tan soft tissue fragment measuring 0 3 cm  Entirely   submitted  One cassette  B  The specimen is received in formalin, labeled with the patient's name   and hospital number, and is designated sigmoid polyp  The specimen   consists of multiple tan soft tissue fragments measuring in aggregate 0 5   x 0 4 x 0 1 cm  Entirely submitted  One cassette  Note: The estimated total formalin fixation time based upon information   provided by the submitting clinician and the standard processing schedule   is 19 75 hours      MAC       Signatures   Electronically signed by : KARLO Bliss ; Mar 28 2016  3:56PM EST                       (Author)

## 2018-01-12 NOTE — PSYCH
Assessment    1  Depression with anxiety (300 4) (F41 8)    Plan    1  PARoxetine HCl - 30 MG Oral Tablet (Paxil); TAKE 1 TABLET DAILY    2  PARoxetine HCl - 30 MG Oral Tablet; TAKE 1 TABLET DAILY    Chief Complaint  History of depression secondary to Parkinsons Disease  History of Present Illness  80year old female,accompanied by daughter  Patient has PArkinsons Disease and is now having more issues with swallowing  Sees Neurology for tx  Mood is good  Has a history of depression and anxiety, but lately has been having some mood lability  Today, though, she is doing fairly well  We will monitor and not make any med changes today  No history of suicide attempt  No suicidal ideation  Resides in her own home with her   They also still [place] a home in [facility]  Past Psychiatric History    Past Psychiatric History: Paxil 20mg per PCP  Substance Abuse Hx    Substance Abuse History: None  Active Problems    1  Acute gastric ulcer (531 30) (K25 3)   2  Anxiety (300 00) (F41 9)   3  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,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,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)   4  Depression with anxiety (300 4) (F41 8)   5  Encounter for routine gynecological examination (V72 31) (Z01 419)   6  Encounter for screening mammogram for malignant neoplasm of breast (V76 12)   (Z12 31)   7  Helicobacter pylori (H  pylori) infection (041 86) (B96 81)   8  Parkinson's disease (332 0) (G20)   9  Postmenopausal Atrophic Vaginitis (627 3)   10  Visit for screening mammogram (V76 12) (Z12 31)    Past Medical History    1  History of Birth History   2  History of Depression (311) (F32 9)   3  History of hypercholesterolemia (V12 29) (Z86 39)   4  History of Parkinson's disease (V12 49) (Z86 69)   5  History of Reported Pap Smear    Allergies    1  Penicillins    Current Meds   1  O-Arturo-D TABS; Therapy: (Recorded:20Mar2014) to Recorded   2  PARoxetine HCl - 30 MG Oral Tablet; TAKE 1 TABLET DAILY;    Therapy: (Recorded:13Pky1636) to Recorded   3  Stalevo 200 -200 MG Oral Tablet; TAKE 1 TABLET 5 TIMES DAILY; Therapy: 03WAQ3770 to ([employer])  Requested for: 60XGN7832; Last   Rx:78Hwr5988 Ordered   4  Stool Softener TABS; Therapy: (Recorded:00Nnf8487) to Recorded   5  Tylenol TABS; Therapy: (Recorded:45Hwn4186) to Recorded   6  Vitamin B-12 TABS; Therapy: (Recorded:20Mar2014) to Recorded   7  Vitamin D TABS; Therapy: (Recorded:20Mar2014) to Recorded    Family Psych History    1  No pertinent family history    Social History    · Denied: History of Alcohol Use (History)   · Being A Social Drinker   · Denied: History of Home Environment Domestic Violence   · Never A Smoker    Vitals  Signs [Data Includes: Current Encounter]   Recorded: 46FHN8028 88:34WN   Systolic: 087  Diastolic: 78  Height: 5 ft 3 in  Weight: 130 lb   BMI Calculated: 23 03  BSA Calculated: 1 61    Physical Exam    Appearance: was calm and cooperative and good eye contact  Observed mood: anxious, but mood appropriate  Observed mood: affect was blunted and affect was constricted   Pakinsons Disease  Speech: a normal rate  Thought processes: coherent/organized  Hallucinations: no hallucinations present  Thought Content: no delusions  Abnormal Thoughts: The patient has no suicidal thoughts  Orientation: The patient is oriented to person, place and time  Recent and Remote Memory: short term memory intact  Attention Span And Concentration: concentration intact  Insight: Insight intact  Judgment: Her judgment was intact  DSM    Provisional Diagnosis: Major Depression-Recurrent    GAF--60  End of Encounter Meds    1  PARoxetine HCl - 30 MG Oral Tablet (Paxil); TAKE 1 TABLET DAILY; Therapy: 23TCM2749 to (Evaluate:09Qum7397); Last Rx:86Bpz6738 Ordered    2  Stalevo 200 -200 MG Oral Tablet (Carbidopa-Levodopa-Entacapone); TAKE 1   TABLET 5 TIMES DAILY;    Therapy: 07CTK6728 to ([employer])  Requested for: 84IEN0655; Last   Rx:15Oln8442 Ordered    3  O-Arturo-D TABS; Therapy: (Recorded:20Mar2014) to Recorded   4  PARoxetine HCl - 30 MG Oral Tablet; TAKE 1 TABLET DAILY; Therapy: (Recorded:86Dht9332) to Recorded   5  Stool Softener TABS; Therapy: (Recorded:04Dec2015) to Recorded   6  Tylenol TABS; Therapy: (Recorded:04Dec2015) to Recorded   7  Vitamin B-12 TABS; Therapy: (Recorded:20Mar2014) to Recorded   8  Vitamin D TABS; Therapy: (Recorded:20Mar2014) to Recorded    Future Appointments    Date/Time Provider Specialty Site   03/23/2016 09:00 AM Gallito Oakes [facility] Neurology [facility]   02/24/2016 11:15 AM KARLO Carrillo   Gastroenterology [facility] OUTPATIENT     Signatures   Electronically signed by : Heidy Ryan; Feb  3 2016 10:43AM EST                       (Author)    Electronically signed by : Margie Cannon MD; Feb  3 2016  5:29PM EST

## 2018-01-13 VITALS — RESPIRATION RATE: 18 BRPM | SYSTOLIC BLOOD PRESSURE: 116 MMHG | HEART RATE: 61 BPM | DIASTOLIC BLOOD PRESSURE: 67 MMHG

## 2018-01-13 NOTE — MISCELLANEOUS
Message  GI Reminder Recall [facility]:   Date: 11/24/2017   Dear Manda Eaton:     Review of our records shows you are due for the following: EGD  Or records indicate that you are due at this time to have a follow-up examination for a EGD  As you know, these tests are done to prevent cancer, a very common disease in the [place] and responsible for thousands of patient deaths each year  We at [facility] are concerned for your health, and would very much appreciate you getting in touch with us at your earliest convenience  Again, this examination is vital to your proper health maintenance and for the prevention of cancer  Please call the following office to schedule your appointment:   [address] [phone]  We look forward to hearing from you!      Sincerely,     [facility]      Signatures   Electronically signed by : Padmini Ellis, ; Nov 24 2017 12:36PM EST                       (Author)

## 2018-01-13 NOTE — PROCEDURES
Procedures by LOYDA Espinosa at 2/15/2016 10:45 AM      Author:  LOYDA Espinosa Service:  (none) Author Type:  Speech and Language Pathologist     Filed:  2/15/2016 12:13 PM Date of Service:  2/15/2016 10:45 AM Status:  Signed     :  LOYDA Espinosa (Speech and Language Pathologist)         Pre-procedure Diagnoses:       1  Parkinson's disease [G20]                Post-procedure Diagnoses:       1  Oropharyngeal dysphagia [R13 12]                Procedures:       1  FL BARIUM Luz Marina Augustin SPEECH [TJY971 (Custom)]                                                      Video Swallow Study      Patient Name: Peter CELAYA Date: 2/15/2016  par  General Information:   Ordering Physician: Dr Marissa Silverman  Date of Evaluation: 02/15/16  Type of Study: Initial MBS  Past Medical History: Parkinson's Disease, dementia, anxiety/depression  Positioning: Lateral  Materials Adminstered: Puree, Soft/minced, Soft solid food, Honey thick liquid, Nectar thick liquid, Thin liquid, Pills with thin liquid      Oral Stage:        Oral Stage Performance: Midly impaired to [facility]  Mildly prolonged but adequate mastication c solid food (cookie)  Adequate bolus formation, transfer & control  Pharyngeal Stage:        Pharnygeal Stage: Mild impaired  Swallow initiation was prompt  Laryngeal rise and epiglottic inverstion (airway protection) were complete  Anterior cervical spine curbature noted at [facility] 5-6 & C 6-7  Joyckiley Pata Pureed and soft food, as well as thick and thin  liquid, passed through the pharynx with ease & safety  With solid food (cookie), at least mild residue noted in pyriforms sinuses/hypopharynx which I presume is related to narrowed space 2* anterior spine curvature  Solid food residue cleared with a  secondary swallow &/or sip of liquid  Risk for food stasis is greatest with dense foods/meats/raw vegetables/pills    Pill stasis did not occur with the 13mm pill given today       Esophageal Stage[de-identified] Gross screening of esophagus at the end of testing revealed some air in esophagus & stasis of a small amount of liquid in the distal esophagus  Assessment Summary: Mrs Florence Vazquez presented c mildly prolonged chewing as well as with s/s at least mild pharyngeal dysphagia characterized by transient stasis of solid food in the hypopharynx (appeared to be 2* narrowed passage way related  to excess anterior cervical spine curvature)  The food cleared from the pharynx with a sip of liquid or secondary swallow  No pill stasis occured today but risk for the same is present  Following testing, results and recommendations were reviewed with  Mr & Mrs Florence Vazquez  Recommendations:  Primary Recommendations:  Oral feedigns of Dysphagia 3 advanced/soft moist food diet (no dense raw vegetables, no skins on fruits & cucumbers,, limit nuts/ & fiber cereals/crackers if indicated  Continue thin liquid  Medication Administration:  (Consider cutting large meds, trial medications in applesauce to aid transit through esophagus if needed )  Dysphagia Treatment Recommendated: Not at this time  I provided my contact information should questions/concerns arise  LOYDA Sampson   [phone]                               Received for:Provider  EPIC   Feb 15 2016 12:13PM [facility] Standard Time

## 2018-01-14 VITALS
OXYGEN SATURATION: 94 % | WEIGHT: 118 LBS | BODY MASS INDEX: 20.91 KG/M2 | DIASTOLIC BLOOD PRESSURE: 54 MMHG | SYSTOLIC BLOOD PRESSURE: 98 MMHG | HEIGHT: 63 IN | HEART RATE: 62 BPM | RESPIRATION RATE: 12 BRPM

## 2018-01-14 VITALS
WEIGHT: 114.3 LBS | BODY MASS INDEX: 20.25 KG/M2 | SYSTOLIC BLOOD PRESSURE: 98 MMHG | HEIGHT: 63 IN | DIASTOLIC BLOOD PRESSURE: 64 MMHG | RESPIRATION RATE: 16 BRPM

## 2018-01-14 VITALS
SYSTOLIC BLOOD PRESSURE: 137 MMHG | HEIGHT: 63 IN | DIASTOLIC BLOOD PRESSURE: 63 MMHG | BODY MASS INDEX: 20.91 KG/M2 | WEIGHT: 118 LBS

## 2018-01-14 NOTE — PROCEDURES
Procedures by Raymundo Hdez MD at 3/2/2017   3:30 PM      Author:  Raymundo Hdez MD Service:  Neurology Author Type:  Physician     Filed:  3/2/2017  3:37 PM Date of Service:  3/2/2017  3:30 PM Status:  Signed     :  Raymundo Hdez MD (Physician)            ELECTROENCEPHALOGRAM    Patient Name:  Raisa Park  MRN: [id number]   :  1930 File #: Donna Stark 65   Age: 80 y o  Encounter #: [id number]   Date performed: 3/2/2017 Referring Provider: Paddy Melgar MD          Report date: 3/2/2017          Study type: awake and drowsy EEG    ICD 10 diagnosis: Spells/Fit NOS R56 9 and Transient alteration of awareness R40 4    Patient History:  EEG is requested to assess for seizures and/or classification of epilepsy  Patient is 80 y o  female with Parkinson's Disease, possible dementia, s/p hip surgery, who had a syncopal event, period of confusion, hallucinations, and an episode  of unresponsiveness  Current AEDs:  Medications include:   calcium carbonate-vitamin D 1 tablet Oral Daily   carbidopa-levodopa 2 tablet Oral 5x Daily   vitamin B-12 1,000 mcg Oral Daily   docusate sodium 100 mg Oral BID   enoxaparin 40 mg Subcutaneous Q24H KATI   entacapone 200 mg Oral 5x Daily   glucosamine sulfate 1,000 mg Oral Daily   mirtazapine 7 5 mg Oral HS   multivitamin-minerals 1 tablet Oral Daily   polyethylene glycol 17 g Oral Daily   senna 1 tablet Oral Daily   tolterodine 4 mg Oral Daily       Description of Procedure: The EEG was performed with electrodes applied using the International 10-20 System  Additional electrodes used included EOG, ECG and T1/T2 electrodes  A single lead ECG channel is also  present  At least 16 channels are reviewed at a time  and formatted into longitudinal bipolar, transverse bipolar, and referential (to common reference or calculated common reference) montages  The EEG was recorded  with the patient awake and drowsy  The recording was technically satisfactory     EEG was recorded from 11:44 to 12:17  Findings:   Background Activity: The background is grossly symmetric with respect to voltages and activities  During wakefulness, the background is fairly organized with anterior  low amplitude beta activity and low-moderate amplitude posterior theta activity and admixed low voltage alpha activity  There is a symmetric  6 5-7 Hz posteriorrhythm  Drowsiness is characterized by diffuse moderate voltage 1 Hz frontally predominant delta activity and central theta activity  Activation Procedures:   Hyperventilation was not performed  Stepped photic stimulation from 1 to 30 fps was performed and produced symmetric photic driving response  Abnormal findings: There is intermittent moderate voltage 0 5-1 Hz delta activity over the right temporal region  Other findings: The single lead ECG shows a sinus rhythm  Interpretation: This is an abnormal 33 minutes awake and drowsy EEG due to intermittent right temporal delta activity and diffuse theta activity and slow posterior rhythm  These findings indicate right temporal focal cerebral dysfunction, possibly structural in origin, superimposed on moderate diffuse cerebral dysfunction, which is etiologically nonspecific  Raymundo Hdez MD  [facility]  Luis THOMPSON    Mar  2 2017  3:37PM [facility] Standard Time

## 2018-01-15 NOTE — MISCELLANEOUS
Message   Recorded as Task   Date: 07/26/2016 10:24 AM, Created By: Katharina Carrillo   Task Name: Miscellaneous   Assigned To: Katharina Carrillo   Regarding Patient: Erlin Figueroa, Status: Active   Comment:    Katharina Carrillo - 26 Jul 2016 10:24 AM     TASK CREATED  Caller: son, Adult Child  Her son called to cx appt for today  He didn't realize she had one and she is out of town  He will call to r/s for her if needed  [address] - 26 Jul 2016 10:33 AM     TASK REPLIED TO: Previously Assigned To [place]  Thanks  Active Problems    1  Abnormal weight loss (783 21) (R63 4)   2  Acute exacerbation of chronic low back pain (724 2,338 19,338 29) (M54 5,G89 29)   3  Acute gastric ulcer (531 30) (K25 3)   4  Anxiety (300 00) (F41 9)   5  Back pain (724 5) (M54 9)   6  Candida esophagitis (112 84) (B37 81)   7  Chronic low back pain (724 2,338 29) (M54 5,G89 29)   8  Chronic pain syndrome (338 4) (G89 4)   9  Cognitive changes (799 59) (R41 89)   10  DDD (degenerative disc disease), lumbosacral (722 52) (M51 37)   11  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,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,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)   12  Depression with anxiety (300 4) (F41 8)   13  Dextroscoliosis (737 39) (M41 80)   14  Dysphagia (787 20) (R13 10)   15  Encounter for routine gynecological examination (V72 31) (Z01 419)   16  Encounter for screening mammogram for malignant neoplasm of breast (V76 12)    (Z12 31)   17  Helicobacter pylori (H  pylori) infection (041 86) (A04 8)   18  Parkinson's disease (332 0) (G20)   19  Postmenopausal Atrophic Vaginitis (627 3)   20  Severe scoliosis (737 30) (M41 9)   21  Spondylolisthesis at L4-L5 level (756 12) (M43 16)   22  Visit for screening mammogram (V76 12) (Z12 31)    Current Meds   1  Baclofen 10 MG Oral Tablet; take 1 tablet bedtime prn muscle spasms; Therapy: 48CVS1200 to (Evaluate:05Oct2016)  Requested for: 81QXO6363; Last   Rx:25Gdp1246 Ordered   2   Carbidopa-Levodopa  MG Oral Tablet; TAKE 2 TABLET 4 TIMES DAILY Recorded   3  Glucosamine Sulfate 1000 MG Oral Capsule; take 1 capsule daily; Therapy: (Recorded:26May2016) to Recorded   4  Mirtazapine 15 MG Oral Tablet; Take 1 tablet daily; Therapy: (Recorded:26May2016) to Recorded   5  Multi-Vitamin TABS; TAKE 1 TABLET DAILY; Therapy: (Recorded:26May2016) to Recorded   6  Oscal 500/200 D-3 TABS; Take 1 tablet daily; Therapy: (Recorded:26May2016) to Recorded   7  Stalevo 200 -200 MG Oral Tablet (Carbidopa-Levodopa-Entacapone); TAKE 1   TABLET 5 TIMES DAILY; Therapy: 89XRA9712 to (Evaluate:40Fco6196)  Requested for: 68Pwt1998; Last   Rx:06Zcp6752 Ordered   8  Tylenol TABS; TAKE 1 TO 2 TABLETS EVERY 6 HOURS AS NEEDED; Therapy: (Recorded:26May2016) to Recorded   9  VESIcare 5 MG Oral Tablet; Take 1 tablet daily; Therapy: (Recorded:26May2016) to Recorded   10  Vitamin B-12 TABS; TAKE 1 TABLET DAILY; Therapy: (Recorded:26May2016) to Recorded   11  Vitamin D TABS; Take 1 tablet daily; Therapy: (Recorded:26May2016) to Recorded    Allergies    1   Penicillins    Signatures   Electronically signed by : Bonnie Gonsales, ; Jul 26 2016 10:34AM EST                       (Author)

## 2018-01-15 NOTE — RESULT NOTES
Verified Results  XR SPINE LUMBAR COMPLETE [place] MINIMUM 6 VIEWS [id number] 03:30PM Azalea Aase Order Number: [id number]   Performing Comments: rm 3     Test Name Result Flag Reference   XR SPINE LUMBAR COMPLETE W BENDING MINIMUM 6 VIEWS (Report)     LUMBAR SPINE     INDICATION: Back pain  COMPARISON: None     VIEWS: AP, bilateral oblique down and lateral projections in neutral, flexion and extension; 6 images     FINDINGS:     Moderate thoracolumbar dextroscoliosis  Grade 1 anterolisthesis is noted at L4-5, unchanged in flexion and extension  Degenerative changes are noted in the posterior elements at L3-4 through L5-S1  There is no radiographic evidence of acute fracture or destructive osseous lesion  Surgical clips are noted in the right upper quadrant of the abdomen  Visualized soft tissues appear unremarkable  IMPRESSION:     Degenerative changes, mid and lower lumbar spine associated with grade 1 spondylolisthesis at L4-5, unchanged in flexion and extension  Workstation performed: [id number]     Signed by:   Leia Ta MD   6/14/16

## 2018-01-16 NOTE — RESULT NOTES
Message   Please inform patient and her family that laboratory studies were normal  Biopsies from endoscopy confirmed fungal infection, she has already been prescribed fluconazole  There was no evidence of H  pylori  The CAT scan did demonstrate some thickening in her rectum and I would recommend strongly that she proceed with a colonoscopy  Please schedule for the patient  Verified Results  * CT CHEST ABDOMEN PELVIS W CONTRAST [id number] 09:46AM Dean Pollard     Test Name Result Flag Reference   CT CHEST ABDOMEN PELVIS W CONTRAST (Report)     CT CHEST, ABDOMEN AND PELVIS WITH IV CONTRAST     INDICATION: Weight loss, loss of appetite, history of ulcer  COMPARISON: None  TECHNIQUE: CT examination of the chest, abdomen and pelvis was performed  This examination, like all CT scans performed in the [facility], was performed utilizing techniques to minimize radiation dose exposure, including the use of    iterative reconstruction and automated exposure control  Axial, sagittal, and coronal reformatted images were submitted for interpretation  100 cc of intravenous Omnipaque 350 was administered for this examination  Enteric contrast was administered  FINDINGS:     CHEST     LUNGS: No infiltrates or suspicious pulmonary masses are seen  There is no evidence of bronchial obstruction  There is tiny 2 mm nodule anteriorly in the right upper lobe on image 18 and there is a similar tiny 2 mm nodule posteriorly in the left    lower lobe on image 35  These are likely not of any clinical significance  PLEURA: Unremarkable  HEART/GREAT VESSELS: The heart appears normal size  There is calcification of the mitral annulus and probably at least minimally involving the aortic valve leaflets  Ascending thoracic aorta is 3 8 cm diameter which is upper limits of normal      MEDIASTINUM AND MIMA: Unremarkable  CHEST WALL AND LOWER NECK: Unremarkable       ABDOMEN     LIVER/BILIARY TREE: There is mild to moderate intrahepatic biliary dilatation as well as moderate dilatation of the common bile duct  This is probably due to advanced patient age and cholecystectomy  There is no visible obstructing calculus or mass to   the level of the ampulla Vater and there does not appear to be suspicious pancreatic duct dilatation  No parenchymal masses are seen in the liver  GALLBLADDER: Gallbladder is surgically absent  SPLEEN: Unremarkable  PANCREAS: The pancreatic duct is visible but does not seem suspiciously dilated  No calcifications or masses or inflammation seen  ADRENAL GLANDS: Unremarkable  KIDNEYS/URETERS: There is a cyst posteriorly at the interpolar level of the left kidney  It measures 13 mm  Otherwise, the kidneys appear within normal limits  No ureteral dilatation  STOMACH AND BOWEL: There is no obstruction  The only area of suspected potential abnormality is in the rectal area where there seems to be somewhat prominent enhancement of the wall of the bowel and perhaps some slight thickening  I would recommend    correlation with physical exam findings or perhaps sigmoidoscopy to help exclude any rectosigmoid pathology  APPENDIX: A normal appendix was visualized  ABDOMINOPELVIC CAVITY: No ascites or free intraperitoneal air  No lymphadenopathy  VESSELS: There is atherosclerosis of the aorta  PELVIS     REPRODUCTIVE ORGANS: Unremarkable for patient's age  URINARY BLADDER: Unremarkable  ABDOMINAL WALL/INGUINAL REGIONS: Unremarkable  OSSEOUS STRUCTURES: 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  There is dextroscoliosis  There is degenerative arthritis of the lower lumbar discs and facet joints  No fractures or destructive bone lesions are seen  IMPRESSION:     Questionable thickening and enhancement of the wall of the rectum  Correlate with physical exam findings and perhaps direct visual assessment with sigmoidoscope       No other suspicious findings       Workstation performed: [id number]     Signed by:   Franklin Valadez MD   2/26/16     (1) TISSUE EXAM [id number] 08:48AM Jorge Rossi     Test Name Result Flag Reference   LAB AP CASE REPORT (Report)     Surgical Pathology Report             Case: [id number]                   Authorizing Provider: Rohith Santizo MD      Collected:      02/24/2016 0848        Ordering Location:   [facility]    Received:      02/24/2016 [address] [place] [address] Endoscopy                               Pathologist:      Melinda Harrell MD                                 Specimens:  A) - Stomach, stomach body bx r/o Cancer, gastritis, gastric ulcer                   B) - Esophagus, bx r/o candida   LAB AP FINAL DIAGNOSIS (Report)     A  Gastric body (biopsy):    - Chronic inactive gastritis involving antral mucosa  - Positive for intestinal metaplasia; negative for dysplasia  - Immunostain for H  pylori (with appropriate positive control) is   negative  - No malignancy identified  B  Esophagus (biopsy):    - Hyperplastic squamous mucosa containing fungal yeast and hyphal forms   with associated acute inflammation and bacteria  - Findings compatible with Candida esophagitis  - Eosinophils number less than 2 per HPF     - No dysplasia or malignancy identified     - See note  LAB AP NOTE (Report)     - The esophageal biopsy (Part B) contains a detached fragment of   foveolar-type mucosa with intestinal metaplasia  This is favored to   represent artifactual displacement  Clinical correlation is recommended  - Microorganism stains to highlight the fungal forms (GMS/PAS) will be   reported in an addendum   - Intradepartmental consultation concurs with the diagnosis  LAB AP SURGICAL ADDITIONAL INFORMATION (Report)     These tests were developed and their performance characteristics   determined by [address] [place] [address] [facility] or [employer]   They may not be cleared or approved by the [place]  Food and   Drug Administration  The FDA has determined that such clearance or   approval is not necessary  These tests are used for clinical purposes  They should not be regarded as investigational or for research  This   laboratory has been approved by CLIA 88, designated as a high-complexity   laboratory and is qualified to perform these tests  LAB AP GROSS DESCRIPTION (Report)     A  The specimen is received in formalin, labeled with the patient's name   and hospital number, and is designated stomach body biopsy rule out   cancer, gastritis, gastric ulcer  The specimen consists of several,   rubbery, tan-brown tissue fragments measuring 1 3 x 0 9 x 0 2 cm in   aggregate dimension  Entirely submitted  One cassette  B  The specimen is received in formalin, labeled with the patient's name   and hospital number, and is designated esophagus biopsy rule out   candida  The specimen consists of several, rubbery, tan-brown tissue   fragments measuring 1 0 x 0 7 x 0 2 cm in aggregate dimension  Entirely   submitted  One cassette  Note: The estimated total formalin fixation elton based upon information   provided by the submitting clinician and the standard processing schedule   is 19 75 hours  SRS   LAB AP CLINICAL INFORMATION Gastric ulcer     LAB AP ADDENDUM 1      Fungal stains (GMS/PAS) highlight pseudohyphae and yeast forms supporting   the diagnosis of Candida esophagitis  (1) CBC/PLT/DIFF [id number] 09:17AM Dean Pollard     Test Name Result Flag Reference   WBC COUNT 5 58 Thousand/uL  4 31-10 16   RBC COUNT 4 58 Million/uL  3 81-5 12   HEMOGLOBIN 14 4 g/dL  11 5-15 4   HEMATOCRIT 43 3 %  34 8-46  1   MCV 95 fL  82-98   MCH 31 4 pg  26 8-34 3   MCHC 33 3 g/dL  31 4-37 4   RDW 14 5 %  11 6-15 1   MPV 10 3 fL  8 9-12 7   PLATELET COUNT 863 Thousands/uL  149-390   nRBC AUTOMATED 0 /100 WBCs     NEUTROPHILS RELATIVE PERCENT 54 %  43-75   LYMPHOCYTES RELATIVE PERCENT 35 %  14-44   MONOCYTES RELATIVE PERCENT 8 %  4-12   EOSINOPHILS RELATIVE PERCENT 2 %  0-6   BASOPHILS RELATIVE PERCENT 1 %  0-1   NEUTROPHILS ABSOLUTE COUNT 3 03 Thousands/µL  1 85-7 62   LYMPHOCYTES ABSOLUTE COUNT 1 93 Thousands/µL  0 60-4 47   MONOCYTES ABSOLUTE COUNT 0 44 Thousand/µL  0 17-1 22   EOSINOPHILS ABSOLUTE COUNT 0 12 Thousand/µL  0 00-0 61   BASOPHILS ABSOLUTE COUNT 0 04 Thousands/µL  0 00-0 10     (1) BILIRUBIN, DIRECT [id number] 09:17AM Dean Pollard     Test Name Result Flag Reference   BILI, DIRECT 0 13 mg/dL  0 00-0 20       Signatures   Electronically signed by : KARLO Bliss ; Feb 27 2016  1:04PM EST                       (Author)

## 2018-01-17 NOTE — RESULT NOTES
Verified Results  * MRI BRAIN WO CONTRAST [id number] 11:43AM Mily Barnhart   TW Order Number: [id number]   Performing Comments: Patient with acute cogntiive changes  R/o stroke   - Patient Instructions: To schedule this appointment, please contact Central Scheduling at [phone]  Test Name Result Flag Reference   MRI BRAIN WO CONTRAST (Report)     This is a summary report  The complete report is available in the patient's medical record  If you cannot access the medical record, please contact the sending organization for a detailed fax or copy  MRI BRAIN WITHOUT CONTRAST     INDICATION: 80-year-old female, cognitive decline, confusion   COMPARISON:  3/21/2015 CT     TECHNIQUE: Sagittal T1, axial T2, axial FLAIR, axial T1, axial Gradient and axial diffusion imaging  IMAGE QUALITY: Diagnostic  FINDINGS:      BRAIN PARENCHYMA:    Mild chronic microvascular ischemic changes are present within the subcortical and deep white matter of the frontal and parietal lobes bilaterally  These findings were less conspicuous by CT  No acute ischemic disease is identified  Age-appropriate cerebral atrophy is present  There is no discrete mass, mass effect or midline shift  No hemorrhage  Brainstem and cerebellum demonstrate normal signal  Diffusion imaging is unremarkable  VENTRICLES: The ventricles are normal in size and contour  SELLA AND PITUITARY GLAND: Normal      ORBITS: Normal      PARANASAL SINUSES: The paranasal sinuses are clear  VASCULATURE: Evaluation of the major intracranial vasculature demonstrates appropriate flow voids       CALVARIUM AND SKULL BASE: Normal      EXTRACRANIAL SOFT TISSUES: Normal        IMPRESSION:   Mild chronic microvascular ischemic disease     No acute ischemic disease     Age-related atrophy       Workstation performed: [id number]     Signed by:   Stella Otero MD   3/14/16

## 2018-02-05 ENCOUNTER — APPOINTMENT (EMERGENCY)
Dept: RADIOLOGY | Facility: HOSPITAL | Age: 83
DRG: 536 | End: 2018-02-05
Payer: MEDICARE

## 2018-02-05 ENCOUNTER — HOSPITAL ENCOUNTER (INPATIENT)
Facility: HOSPITAL | Age: 83
LOS: 3 days | Discharge: NURSING, REHAB OR LONG-TERM CARE | DRG: 536 | End: 2018-02-08
Attending: EMERGENCY MEDICINE | Admitting: ORTHOPAEDIC SURGERY
Payer: MEDICARE

## 2018-02-05 DIAGNOSIS — D62 ANEMIA ASSOCIATED WITH ACUTE BLOOD LOSS: ICD-10-CM

## 2018-02-05 DIAGNOSIS — S72.112A: Primary | ICD-10-CM

## 2018-02-05 DIAGNOSIS — G20 PARKINSON DISEASE (HCC): ICD-10-CM

## 2018-02-05 DIAGNOSIS — E16.2 HYPOGLYCEMIA: ICD-10-CM

## 2018-02-05 LAB
ANION GAP SERPL CALCULATED.3IONS-SCNC: 4 MMOL/L (ref 4–13)
ATRIAL RATE: 54 BPM
BASOPHILS # BLD AUTO: 0.02 THOUSANDS/ΜL (ref 0–0.1)
BASOPHILS NFR BLD AUTO: 0 % (ref 0–1)
BUN SERPL-MCNC: 25 MG/DL (ref 5–25)
CALCIUM SERPL-MCNC: 8.6 MG/DL (ref 8.3–10.1)
CHLORIDE SERPL-SCNC: 104 MMOL/L (ref 100–108)
CO2 SERPL-SCNC: 30 MMOL/L (ref 21–32)
CREAT SERPL-MCNC: 0.71 MG/DL (ref 0.6–1.3)
EOSINOPHIL # BLD AUTO: 0.09 THOUSAND/ΜL (ref 0–0.61)
EOSINOPHIL NFR BLD AUTO: 2 % (ref 0–6)
ERYTHROCYTE [DISTWIDTH] IN BLOOD BY AUTOMATED COUNT: 13.9 % (ref 11.6–15.1)
GFR SERPL CREATININE-BSD FRML MDRD: 77 ML/MIN/1.73SQ M
GLUCOSE SERPL-MCNC: 98 MG/DL (ref 65–140)
HCT VFR BLD AUTO: 37.6 % (ref 34.8–46.1)
HGB BLD-MCNC: 12.6 G/DL (ref 11.5–15.4)
LYMPHOCYTES # BLD AUTO: 1.08 THOUSANDS/ΜL (ref 0.6–4.47)
LYMPHOCYTES NFR BLD AUTO: 18 % (ref 14–44)
MCH RBC QN AUTO: 32.1 PG (ref 26.8–34.3)
MCHC RBC AUTO-ENTMCNC: 33.5 G/DL (ref 31.4–37.4)
MCV RBC AUTO: 96 FL (ref 82–98)
MONOCYTES # BLD AUTO: 0.4 THOUSAND/ΜL (ref 0.17–1.22)
MONOCYTES NFR BLD AUTO: 7 % (ref 4–12)
NEUTROPHILS # BLD AUTO: 4.56 THOUSANDS/ΜL (ref 1.85–7.62)
NEUTS SEG NFR BLD AUTO: 73 % (ref 43–75)
NRBC BLD AUTO-RTO: 0 /100 WBCS
P AXIS: 31 DEGREES
PLATELET # BLD AUTO: 153 THOUSANDS/UL (ref 149–390)
PMV BLD AUTO: 10.8 FL (ref 8.9–12.7)
POTASSIUM SERPL-SCNC: 4 MMOL/L (ref 3.5–5.3)
PR INTERVAL: 144 MS
QRS AXIS: 27 DEGREES
QRSD INTERVAL: 146 MS
QT INTERVAL: 478 MS
QTC INTERVAL: 453 MS
RBC # BLD AUTO: 3.93 MILLION/UL (ref 3.81–5.12)
SODIUM SERPL-SCNC: 138 MMOL/L (ref 136–145)
T WAVE AXIS: 42 DEGREES
TROPONIN I SERPL-MCNC: <0.02 NG/ML
VENTRICULAR RATE: 54 BPM
WBC # BLD AUTO: 6.15 THOUSAND/UL (ref 4.31–10.16)

## 2018-02-05 PROCEDURE — 80048 BASIC METABOLIC PNL TOTAL CA: CPT | Performed by: EMERGENCY MEDICINE

## 2018-02-05 PROCEDURE — 73502 X-RAY EXAM HIP UNI 2-3 VIEWS: CPT

## 2018-02-05 PROCEDURE — 99285 EMERGENCY DEPT VISIT HI MDM: CPT

## 2018-02-05 PROCEDURE — 70450 CT HEAD/BRAIN W/O DYE: CPT

## 2018-02-05 PROCEDURE — 93005 ELECTROCARDIOGRAM TRACING: CPT

## 2018-02-05 PROCEDURE — 84484 ASSAY OF TROPONIN QUANT: CPT | Performed by: EMERGENCY MEDICINE

## 2018-02-05 PROCEDURE — 96361 HYDRATE IV INFUSION ADD-ON: CPT

## 2018-02-05 PROCEDURE — 96360 HYDRATION IV INFUSION INIT: CPT

## 2018-02-05 PROCEDURE — 85025 COMPLETE CBC W/AUTO DIFF WBC: CPT | Performed by: EMERGENCY MEDICINE

## 2018-02-05 PROCEDURE — 93010 ELECTROCARDIOGRAM REPORT: CPT | Performed by: INTERNAL MEDICINE

## 2018-02-05 PROCEDURE — 72125 CT NECK SPINE W/O DYE: CPT

## 2018-02-05 PROCEDURE — 73610 X-RAY EXAM OF ANKLE: CPT

## 2018-02-05 PROCEDURE — 72192 CT PELVIS W/O DYE: CPT

## 2018-02-05 PROCEDURE — 36415 COLL VENOUS BLD VENIPUNCTURE: CPT | Performed by: EMERGENCY MEDICINE

## 2018-02-05 PROCEDURE — 73630 X-RAY EXAM OF FOOT: CPT

## 2018-02-05 RX ORDER — MIRTAZAPINE 15 MG/1
15 TABLET, FILM COATED ORAL
Status: DISCONTINUED | OUTPATIENT
Start: 2018-02-05 | End: 2018-02-08 | Stop reason: HOSPADM

## 2018-02-05 RX ORDER — ONDANSETRON 2 MG/ML
4 INJECTION INTRAMUSCULAR; INTRAVENOUS EVERY 6 HOURS PRN
Status: DISCONTINUED | OUTPATIENT
Start: 2018-02-05 | End: 2018-02-08 | Stop reason: HOSPADM

## 2018-02-05 RX ORDER — MORPHINE SULFATE 2 MG/ML
2 INJECTION, SOLUTION INTRAMUSCULAR; INTRAVENOUS
Status: DISCONTINUED | OUTPATIENT
Start: 2018-02-05 | End: 2018-02-08 | Stop reason: HOSPADM

## 2018-02-05 RX ORDER — SODIUM CHLORIDE, SODIUM LACTATE, POTASSIUM CHLORIDE, CALCIUM CHLORIDE 600; 310; 30; 20 MG/100ML; MG/100ML; MG/100ML; MG/100ML
75 INJECTION, SOLUTION INTRAVENOUS CONTINUOUS
Status: DISCONTINUED | OUTPATIENT
Start: 2018-02-05 | End: 2018-02-08 | Stop reason: HOSPADM

## 2018-02-05 RX ORDER — OXYCODONE HYDROCHLORIDE 10 MG/1
10 TABLET ORAL EVERY 4 HOURS PRN
Status: DISCONTINUED | OUTPATIENT
Start: 2018-02-05 | End: 2018-02-08 | Stop reason: HOSPADM

## 2018-02-05 RX ORDER — SOLIFENACIN SUCCINATE 5 MG/1
5 TABLET, FILM COATED ORAL
COMMUNITY
End: 2019-08-30 | Stop reason: HOSPADM

## 2018-02-05 RX ORDER — ACETAMINOPHEN 325 MG/1
650 TABLET ORAL EVERY 4 HOURS PRN
Status: DISCONTINUED | OUTPATIENT
Start: 2018-02-05 | End: 2018-02-08 | Stop reason: HOSPADM

## 2018-02-05 RX ORDER — OXYCODONE HYDROCHLORIDE 5 MG/1
5 TABLET ORAL EVERY 4 HOURS PRN
Status: DISCONTINUED | OUTPATIENT
Start: 2018-02-05 | End: 2018-02-08 | Stop reason: HOSPADM

## 2018-02-05 RX ORDER — CARBIDOPA, LEVODOPA AND ENTACAPONE 50; 200; 200 MG/1; MG/1; MG/1
1 TABLET, FILM COATED ORAL
Status: DISCONTINUED | OUTPATIENT
Start: 2018-02-05 | End: 2018-02-05 | Stop reason: CLARIF

## 2018-02-05 RX ORDER — SENNOSIDES 8.6 MG
1 TABLET ORAL DAILY
Status: DISCONTINUED | OUTPATIENT
Start: 2018-02-06 | End: 2018-02-08 | Stop reason: HOSPADM

## 2018-02-05 RX ORDER — ACETAMINOPHEN 325 MG/1
650 TABLET ORAL ONCE
Status: COMPLETED | OUTPATIENT
Start: 2018-02-05 | End: 2018-02-05

## 2018-02-05 RX ORDER — ENTACAPONE 200 MG/1
200 TABLET ORAL
Status: DISCONTINUED | OUTPATIENT
Start: 2018-02-05 | End: 2018-02-08 | Stop reason: HOSPADM

## 2018-02-05 RX ORDER — DOCUSATE SODIUM 100 MG/1
100 CAPSULE, LIQUID FILLED ORAL 2 TIMES DAILY
Status: DISCONTINUED | OUTPATIENT
Start: 2018-02-05 | End: 2018-02-08 | Stop reason: HOSPADM

## 2018-02-05 RX ORDER — TOLTERODINE 2 MG/1
2 CAPSULE, EXTENDED RELEASE ORAL DAILY
Status: DISCONTINUED | OUTPATIENT
Start: 2018-02-06 | End: 2018-02-08 | Stop reason: HOSPADM

## 2018-02-05 RX ORDER — LISINOPRIL 5 MG/1
5 TABLET ORAL DAILY
Status: DISCONTINUED | OUTPATIENT
Start: 2018-02-06 | End: 2018-02-05

## 2018-02-05 RX ADMIN — CARBIDOPA AND LEVODOPA 2 TABLET: 25; 100 TABLET ORAL at 21:26

## 2018-02-05 RX ADMIN — ENTACAPONE 200 MG: 200 TABLET, FILM COATED ORAL at 21:26

## 2018-02-05 RX ADMIN — SODIUM CHLORIDE 1000 ML: 0.9 INJECTION, SOLUTION INTRAVENOUS at 13:00

## 2018-02-05 RX ADMIN — SODIUM CHLORIDE, SODIUM LACTATE, POTASSIUM CHLORIDE, AND CALCIUM CHLORIDE 75 ML/HR: .6; .31; .03; .02 INJECTION, SOLUTION INTRAVENOUS at 20:21

## 2018-02-05 RX ADMIN — DOCUSATE SODIUM 100 MG: 100 CAPSULE, LIQUID FILLED ORAL at 20:21

## 2018-02-05 RX ADMIN — MIRTAZAPINE 15 MG: 15 TABLET, FILM COATED ORAL at 21:26

## 2018-02-05 RX ADMIN — ACETAMINOPHEN 650 MG: 325 TABLET ORAL at 14:29

## 2018-02-05 NOTE — CONSULTS
Orthopedics   Sabi Chang 80 y o  female MRN: [id number]  Unit/Bed#: Cat Scan      Chief Complaint:   left hip pain    HPI:   80 y  o female ambulator with walker, history of Parkinson's disease, complaining of left hip pain status post mechanical fall  Per her son, she was not using her walker when she tripped and fell onto her left side  Her fall reportedly happened yesterday afternoon, she was initially able to weightbear on the left leg however her pain became increasingly difficult for her to tolerate  She denies any other injuries or sites of tenderness  Review Of Systems:   · Skin: Normal  · Neuro: See HPI  · Musculoskeletal: See HPI  · 14 point review of systems negative except as stated above     Past Medical History:   Past Medical History:   Diagnosis Date    Anxiety     Dementia     Depression     Gastric ulcer     H/O:  section     HLD (hyperlipidemia) 2016    Hypercholesteremia     Parkinson's disease ([facility] [address] )     Raynaud's disease        Past Surgical History:   Past Surgical History:   Procedure Laterality Date     SECTION      CHOLECYSTECTOMY      ESOPHAGOGASTRODUODENOSCOPY      KNEE ARTHROSCOPY Left     [place] COLONOSCOPY FLX DX W/COLLJ SPEC WHEN PFRMD N/A 3/18/2016    Procedure: COLONOSCOPY;  Surgeon: Brea Arnold MD;  [facility]; Service: Gastroenterology    [place] ESOPHAGOGASTRODUODENOSCOPY TRANSORAL DIAGNOSTIC N/A 2016    Procedure: ESOPHAGOGASTRODUODENOSCOPY (EGD); Surgeon: Brea Arnold MD;  [facility]; Service: Gastroenterology    [place] OPEN RX FEMUR FX+INTRAMED CHARLEY Right 2017    Procedure: INSERTION NAIL IM FEMUR ANTEGRADE (TROCHANTERIC); Surgeon: Rashmi Thayer MD;  [facility];  Service: Orthopedics       Family History:  Family history reviewed and non-contributory  No family history on file      Social History:  Social History     Social History    Marital status: /Civil Union     Spouse name: N/A   Nelli Orona Number of children: N/A    Years of education: N/A     Social History Main Topics    Smoking status: Never Smoker    Smokeless tobacco: Never Used    Alcohol use 0 6 oz/week     1 Glasses of wine per week      Comment: social    Drug use: No    Sexual activity: Not Asked     Other Topics Concern    None     Social History Narrative    None       Allergies: Allergies   Allergen Reactions    Penicillins      Unknown reaction  Does not remember when last had it           Labs:    0  Lab Value Date/Time   HCT 37 6 02/05/2018 1300   HCT 45 6 12/07/2017 0706   HCT 39 7 06/02/2017 1528   HCT 44 2 11/21/2015 0930   HCT 41 2 05/06/2015 1626   HCT 31 3 (L) 04/07/2015 1946   HGB 12 6 02/05/2018 1300   HGB 15 0 12/07/2017 0706   HGB 12 9 06/02/2017 1528   HGB 14 7 11/21/2015 0930   HGB 12 7 05/06/2015 1626   HGB 9 7 (L) 04/07/2015 1946   INR 1 05 02/23/2017 0432   WBC 6 15 02/05/2018 1300   WBC 3 25 (L) 12/07/2017 0706   WBC 5 50 06/02/2017 1528   WBC 5 06 11/21/2015 0930   WBC 4 00 (L) 05/06/2015 1626   WBC 7 16 04/07/2015 1946   ESR 14 06/30/2016 0839       Meds:  No current facility-administered medications for this encounter       Current Outpatient Prescriptions:     Calcium Carbonate-Vitamin D (OYST-KANDI D PO), Take 1 tablet by mouth daily 500mg OsCal/Calcium , Disp: , Rfl:     carbidopa-levodopa-entacapone (STALEVO) -200 MG per tablet, Take 1 tablet by mouth 5 (five) times a day Indications: Parkinson's Disease with Unknown Cause , Disp: , Rfl:     Cholecalciferol (VITAMIN D PO), Take by mouth , Disp: , Rfl:     CHOLINE FENOFIBRATE PO, Take 600 mg by mouth daily, Disp: , Rfl:     Cyanocobalamin (VITAMIN B-12 PO), Take by mouth , Disp: , Rfl:     dextrose 50 %, Infuse 25 mL into a venous catheter as needed for low blood sugar (for blood glucose <60) for up to 30 days, Disp: 50 mL, Rfl: 0    enoxaparin (LOVENOX) 40 mg/0 4 mL, Inject 0 4 mL under the skin every 24 hours for 17 days, Disp: 12 mL, Rfl: 0    glucosamine 500 MG CAPS capsule, Take 1,000 mg by mouth, Disp: , Rfl:     lisinopril (ZESTRIL) 5 mg tablet, Take 5 mg by mouth daily, Disp: , Rfl:     mirtazapine (REMERON) 15 mg tablet, Take 1 tablet by mouth daily at bedtime for 30 days (Patient taking differently: Take 7 5 mg by mouth daily at bedtime  ), Disp: 30 tablet, Rfl: 0    Multiple Vitamins-Minerals (CENTRUM SILVER ADULT 50+ PO), Take by mouth daily  , Disp: , Rfl:     Blood Culture:   No results found for: BLOODCX    Wound Culture:   No results found for: WOUNDCULT    Ins and Outs:  I/O last 24 hours: In: 1000 [IV Piggyback:1000]  Out: -           Physical Exam:   BP (!) 178/74   Pulse (!) 54   Temp 98 4 °F (36 9 °C) (Oral)   Resp 16   LMP  (LMP Unknown)   SpO2 95%   Gen: Alert and responsive  HEENT: EOMI, eyes clear, moist mucus membranes, hearing intact  Respiratory: Bilateral chest rise  No audible wheezing found  Cardiovascular: Regular Rate and Rhythm  Abdomen: soft nontender/nondistended  Musculoskeletal: left lower extremity  · Skin pink dry and intact, no erythema  · Painful range of motion of hip joint  · Sensation intact L1-S1  · 5/5 motor strength to hip flexion/extension, knee flexion/extension, ankle dorsi/plantar flexion  · 2+ DP pulse    Radiology:   I personally reviewed the films  X-rays of left hip shows greater trochanteric fracture    _*_*_*_*_*_*_*_*_*_*_*_*_*_*_*_*_*_*_*_*_*_*_*_*_*_*_*_*_*_*_*_*_*_*_*_*_*_*_*_*_*    Assessment:  80 y  o female with left hip greater trochanteric fracture    Plan:   · WBAT left lower extremity  · PT  · Pain control  · If required, can admit to Medicine for pain control and ambulatory dysfunction  · Encourage ambulation, may require DVT prophylaxis if bed bound for extensive period, mechanical DVT prophylaxis currently      Savanah Perdomo MD

## 2018-02-05 NOTE — ED PROVIDER NOTES
History  Chief Complaint   Patient presents with    Hip Pain     patient fell yesterday  Patient complaining of left hip pain  Difficulty ambulating today  HPI   81 yo female presents after a witnessed mechanical fall which occurred yesterday  pt  was using her walker in the laundry room when she fell forward onto her L hip  pt  denies presyncopal symptoms or LOC  he is not on any blood thinners  pt  was able to ambulate without issues yesterday, but woke up today with pain localized to her L hip that radiates to the L knee  hte pain is made worse with bearing weight  pt  has been unable to ambulate today 2/2 pain in the L hip  she otherwise denies weakness, numbness, tingling, cp/sob/n/v/d/f/ch   pt  has pmh parkinsons disease and is on sinemet  she lives at home wtih her , who is schedule for pacemaker placement later this week  family is concerned that pt  is not safe to return home at this time  pt  [id number]  Imp/plan: 81 yo female presents after mechanical fall yesterday with pain in L hip and inability to bear wieght in L leg 2/2 pain  Concern for fracture  Will get ct head/c-spine  Check basic labs, get plain films L hip/pelvis, L foot, L ankle, treat pain with tylenol, reassess  Prior to Admission Medications   Prescriptions Last Dose Informant Patient Reported? Taking? CHOLINE FENOFIBRATE PO   Yes No   Sig: Take 600 mg by mouth daily   Calcium Carbonate-Vitamin D (OYST-KANDI D PO) 2/5/2018 at Unknown time  Yes Yes   Sig: Take 1 tablet by mouth daily 500mg OsCal/Calcium    Cholecalciferol (VITAMIN D PO) 2/5/2018 at Unknown time  Yes Yes   Sig: Take 1 tablet by mouth daily     Cyanocobalamin (VITAMIN B-12 PO)   Yes No   Sig: Take by mouth  Multiple Vitamins-Minerals (CENTRUM SILVER ADULT 50+ PO) 2/5/2018 at Unknown time  Yes Yes   Sig: Take by mouth daily     carbidopa-levodopa-entacapone (STALEVO) -200 MG per tablet 2/5/2018 at 1400  Yes Yes   Sig: Take 1 tablet by mouth 5 (five) times a day Indications: Parkinson's Disease with Unknown Cause  dextrose 50 %   No No   Sig: Infuse 25 mL into a venous catheter as needed for low blood sugar (for blood glucose <60) for up to 30 days   enoxaparin (LOVENOX) 40 mg/0 4 mL   No No   Sig: Inject 0 4 mL under the skin every 24 hours for 17 days   glucosamine 500 MG CAPS capsule 2018 at Unknown time  Yes Yes   Sig: Take 1,000 mg by mouth daily     lisinopril (ZESTRIL) 5 mg tablet   Yes No   Sig: Take 5 mg by mouth daily   mirtazapine (REMERON) 15 mg tablet 2018 at Unknown time  No Yes   Sig: Take 1 tablet by mouth daily at bedtime for 30 days   solifenacin (VESICARE) 5 mg tablet 2018 at Unknown time  Yes Yes   Sig: Take 5 mg by mouth daily after dinner      Facility-Administered Medications: None       Past Medical History:   Diagnosis Date    Anxiety     Dementia     Depression     Gastric ulcer     H/O:  section     HLD (hyperlipidemia) 2016    Hypercholesteremia     Parkinson's disease ([facility] [address] )     Raynaud's disease        Past Surgical History:   Procedure Laterality Date     SECTION      CHOLECYSTECTOMY      ESOPHAGOGASTRODUODENOSCOPY      KNEE ARTHROSCOPY Left     [place] COLONOSCOPY FLX DX W/COLLJ SPEC WHEN PFRMD N/A 3/18/2016    Procedure: COLONOSCOPY;  Surgeon: Brett Ta MD;  [facility]; Service: Gastroenterology    [place] ESOPHAGOGASTRODUODENOSCOPY TRANSORAL DIAGNOSTIC N/A 2016    Procedure: ESOPHAGOGASTRODUODENOSCOPY (EGD); Surgeon: Brett Ta MD;  [facility]; Service: Gastroenterology    [place] OPEN RX FEMUR FX+INTRAMED CHARLEY Right 2017    Procedure: INSERTION NAIL IM FEMUR ANTEGRADE (TROCHANTERIC); Surgeon: Tere Sheets MD;  [facility];  Service: Orthopedics       History reviewed  No pertinent family history  I have reviewed and agree with the history as documented      Social History   Substance Use Topics    Smoking status: Never Smoker    Smokeless tobacco: Never Used    Alcohol use 0 6 oz/week     1 Glasses of wine per week      Comment: social        Review of Systems   Constitutional: Positive for activity change  Negative for chills and fever  HENT: Negative for sore throat, trouble swallowing and voice change  Eyes: Negative for photophobia  Respiratory: Negative for cough and shortness of breath  Cardiovascular: Negative for chest pain, palpitations and leg swelling  Gastrointestinal: Negative for abdominal pain, diarrhea, nausea and vomiting  Endocrine: Negative for polyuria  Genitourinary: Negative for dysuria, vaginal discharge and vaginal pain  Musculoskeletal: Positive for arthralgias and gait problem  Negative for back pain, joint swelling, myalgias, neck pain and neck stiffness  Skin: Negative for rash  Allergic/Immunologic: Negative  Neurological: Negative for dizziness, tremors, seizures, syncope, facial asymmetry, speech difficulty, weakness, light-headedness, numbness and headaches  Hematological: Negative for adenopathy  Psychiatric/Behavioral: Negative for agitation  Physical Exam  ED Triage Vitals   Temperature Pulse Respirations Blood Pressure SpO2   02/05/18 1029 02/05/18 1029 02/05/18 1029 02/05/18 1029 02/05/18 1029   98 4 °F (36 9 °C) 58 15 95/54 98 %      Temp Source Heart Rate Source Patient Position - Orthostatic VS BP Location FiO2 (%)   02/05/18 1029 02/05/18 1155 02/05/18 1029 02/05/18 1029 --   Oral Monitor Sitting Left arm       Pain Score       02/05/18 1029       5           Orthostatic Vital Signs  Vitals:    02/06/18 1513 02/06/18 2300 02/07/18 0741 02/07/18 1500   BP: 130/60 152/60 140/63 158/65   Pulse: 56 60 55 55   Patient Position - Orthostatic VS:  Lying Lying Sitting       Physical Exam   Constitutional: She is oriented to person, place, and time  She appears well-developed and well-nourished  No distress  HENT:   Head: Normocephalic and atraumatic     Right Ear: No hemotympanum  Left Ear: No hemotympanum  Nose: Nose normal  No nasal septal hematoma  Mouth/Throat: Uvula is midline, oropharynx is clear and moist and mucous membranes are normal  She does not have dentures  No oropharyngeal exudate  Eyes: Conjunctivae and EOM are normal  Pupils are equal, round, and reactive to light  Right eye exhibits no discharge  Left eye exhibits no discharge  Right eye exhibits no nystagmus  Left eye exhibits no nystagmus  Neck: Trachea normal, normal range of motion, full passive range of motion without pain and phonation normal  Neck supple  No JVD present  No tracheal tenderness present  No tracheal deviation present  No thyromegaly present  No c-spine tenderness   Cardiovascular: Normal rate, regular rhythm, normal heart sounds and intact distal pulses  Exam reveals no gallop and no friction rub  No murmur heard  Pulmonary/Chest: Effort normal and breath sounds normal  No stridor  No respiratory distress  She has no wheezes  She has no rales  She exhibits no tenderness  Abdominal: Soft  Bowel sounds are normal  She exhibits no distension and no mass  There is no tenderness  There is no rebound and no guarding  No hernia  Musculoskeletal: Normal range of motion  She exhibits no edema, tenderness or deformity  Tenderness to palpation over L ASIS  No skin changes, deformity  Normal hip ROM  No t, l spine tenderness  No stepoff/deformity   Neurological: She is alert and oriented to person, place, and time  She has normal strength  No cranial nerve deficit or sensory deficit  GCS eye subscore is 4  GCS verbal subscore is 5  GCS motor subscore is 6  Reflex Scores:       Patellar reflexes are 2+ on the right side and 2+ on the left side  Achilles reflexes are 2+ on the right side and 2+ on the left side  nonfocal  Normal speech  Normal strength   Skin: Skin is warm and dry  Capillary refill takes less than 2 seconds  No rash noted  She is not diaphoretic  Psychiatric: She has a normal mood and affect  Nursing note and vitals reviewed        ED Medications  Medications   lactated ringers infusion (0 mL/hr Intravenous Stopped 2/6/18 1927)   docusate sodium (COLACE) capsule 100 mg (100 mg Oral Given 2/7/18 0835)   senna (SENOKOT) tablet 8 6 mg (8 6 mg Oral Given 2/7/18 0833)   ondansetron (ZOFRAN) injection 4 mg (not administered)   acetaminophen (TYLENOL) tablet 650 mg (not administered)   oxyCODONE (ROXICODONE) IR tablet 5 mg (5 mg Oral Given 2/7/18 0834)   oxyCODONE (ROXICODONE) immediate release tablet 10 mg (not administered)   morphine injection 2 mg (not administered)   enoxaparin (LOVENOX) subcutaneous injection 40 mg (40 mg Subcutaneous Given 2/7/18 0835)   mirtazapine (REMERON) tablet 15 mg (15 mg Oral Given 2/6/18 2153)   entacapone (COMTAN) tablet 200 mg (200 mg Oral Given 2/7/18 1338)     And   carbidopa-levodopa (SINEMET)  mg per tablet 2 tablet (2 tablets Oral Given 2/7/18 1338)   tolterodine (DETROL LA) 24 hr capsule 2 mg (2 mg Oral Given 2/7/18 0833)   sodium chloride 0 9 % bolus 1,000 mL (0 mL Intravenous Stopped 2/5/18 1541)   acetaminophen (TYLENOL) tablet 650 mg (650 mg Oral Given 2/5/18 1429)       Diagnostic Studies  Results Reviewed     Procedure Component Value Units Date/Time    Comprehensive metabolic panel [[id number]]  (Abnormal) Collected:  02/06/18 0522    Lab Status:  Final result Specimen:  Blood from Arm, Right Updated:  02/06/18 0618     Sodium 140 mmol/L      Potassium 4 3 mmol/L      Chloride 106 mmol/L      CO2 30 mmol/L      Anion Gap 4 mmol/L      BUN 22 mg/dL      Creatinine 0 77 mg/dL      Glucose 81 mg/dL      Calcium 8 8 mg/dL      AST 14 U/L      ALT 8 (L) U/L      Alkaline Phosphatase 75 U/L      Total Protein 6 6 g/dL      Albumin 3 2 (L) g/dL      Total Bilirubin 0 82 mg/dL      eGFR 70 ml/min/1 73sq m     Narrative:         National Kidney Disease Education Program recommendations are as follows:  GFR calculation is accurate only with a steady state creatinine  Chronic Kidney disease less than 60 ml/min/1 73 sq  meters  Kidney failure less than 15 ml/min/1 73 sq  meters  CBC (With Platelets) [[id number]]  (Normal) Collected:  02/06/18 0522    Lab Status:  Final result Specimen:  Blood from Arm, Right Updated:  02/06/18 0611     WBC 5 21 Thousand/uL      RBC 3 98 Million/uL      Hemoglobin 12 6 g/dL      Hematocrit 37 9 %      MCV 95 fL      MCH 31 7 pg      MCHC 33 2 g/dL      RDW 14 3 %      Platelets 558 Thousands/uL      MPV 11 4 fL     Platelet count [[id number]]     Lab Status:  No result Specimen:  Blood     Troponin I [[id number]]  (Normal) Collected:  02/05/18 1259    Lab Status:  Final result Specimen:  Blood from Arm, Left Updated:  02/05/18 1324     Troponin I <0 02 ng/mL     Narrative:         [employer] Chemistry analyzer 99% cutoff is > 0 04 ng/mL in network labs    o cTnI 99% cutoff is useful only when applied to patients in the clinical setting of myocardial ischemia  o cTnI 99% cutoff should be interpreted in the context of clinical history, ECG findings and possibly cardiac imaging to establish correct diagnosis  o cTnI 99% cutoff may be suggestive but clearly not indicative of a coronary event without the clinical setting of myocardial ischemia  Basic metabolic panel [[id number]] Collected:  02/05/18 1259    Lab Status:  Final result Specimen:  Blood from Hand, Left Updated:  02/05/18 1321     Sodium 138 mmol/L      Potassium 4 0 mmol/L      Chloride 104 mmol/L      CO2 30 mmol/L      Anion Gap 4 mmol/L      BUN 25 mg/dL      Creatinine 0 71 mg/dL      Glucose 98 mg/dL      Calcium 8 6 mg/dL      eGFR 77 ml/min/1 73sq m     Narrative:         National Kidney Disease Education Program recommendations are as follows:  GFR calculation is accurate only with a steady state creatinine  Chronic Kidney disease less than 60 ml/min/1 73 sq  meters  Kidney failure less than 15 ml/min/1 73 sq  meters      CBC and differential [[id number]]  (Normal) Collected:  02/05/18 1300    Lab Status:  Final result Specimen:  Blood from Hand, Left Updated:  02/05/18 1309     WBC 6 15 Thousand/uL      RBC 3 93 Million/uL      Hemoglobin 12 6 g/dL      Hematocrit 37 6 %      MCV 96 fL      MCH 32 1 pg      MCHC 33 5 g/dL      RDW 13 9 %      MPV 10 8 fL      Platelets 748 Thousands/uL      nRBC 0 /100 WBCs      Neutrophils Relative 73 %      Lymphocytes Relative 18 %      Monocytes Relative 7 %      Eosinophils Relative 2 %      Basophils Relative 0 %      Neutrophils Absolute 4 56 Thousands/µL      Lymphocytes Absolute 1 08 Thousands/µL      Monocytes Absolute 0 40 Thousand/µL      Eosinophils Absolute 0 09 Thousand/µL      Basophils Absolute 0 02 Thousands/µL                  CT pelvis wo contrast   Final Result by Rodolfo Calderon DO (02/05 7756)      Fracture greater trochanter left proximal femur  Small volume of simple pelvic free fluid  Large volume of stool in the rectum  Workstation performed: [id number]         XR ankle 3+ views LEFT   ED Interpretation by Bernardo Gr MD (02/05 6215)   No acute findings      Final Result by Isabell Mcnally MD (02/05 1544)      No acute osseous abnormality  Workstation performed: [id number]         XR foot 3+ views LEFT   ED Interpretation by Bernardo Gr MD (02/05 9679)   No acute findings      Final Result by Isabell Mcnally MD (02/05 4220)      No acute osseous abnormality  Given the degree of osteopenia, if occult fracture is suspected, short-term follow-up may be considered  Workstation performed: [id number]         XR hip/pelv 2-3 vws left   ED Interpretation by Bernardo Gr MD (02/05 5070)   Abnormal   R Pubic rami fracture      Final Result by Isabell Mcnally MD (02/05 6807)      Suspected nondisplaced fracture of the left femoral greater trochanter  CT evaluation has been ordered           Workstation performed: [id number]         CT cervical spine without contrast   Final Result by Rodolfo Calderon DO (02/05 1414)      No cervical spine fracture or traumatic malalignment  Degenerative disc disease is noted  Workstation performed: [id number]         CT head without contrast   Final Result by Rodolfo Calderon DO (02/05 1403)      No acute intracranial abnormality  Microangiopathic changes  Workstation performed: [id number]               Procedures  Procedures      Phone Consults  ED Phone Contact    ED Course  ED Course as of Feb 07 1737   Mon Feb 05, 2018   1736 eGFR: 68   1737 Hemoglobin: 12 6   1738 Spoke with dr Hoang Vallejo  Pt will be admitted to their service            Identification of Seniors at [address] [place] [address] Most Recent Value   (ISAR) Identification of Seniors at Risk   Before the illness or injury that brought you to the Emergency, did you need someone to help you on a regular basis? 1 Filed at: 02/05/2018 1028   In the last 24 hours, have you needed more help than usual?  1 Filed at: 02/05/2018 1028   Have you been hospitalized for one or more nights during the past 6 months? 1 Filed at: 02/05/2018 1028   In general, do you see well?  0 Filed at: 02/05/2018 1028   In general, do you have serious problems with your memory? 0 Filed at: 02/05/2018 1028   Do you take more than three different medications every day?   1 Filed at: 02/05/2018 1028   ISAR Score  4 Filed at: 02/05/2018 1028                          MDM  CritCare Time    Disposition  Final diagnoses:   Closed fracture of greater trochanter of left femur ([facility]     Time reflects when diagnosis was documented in both MDM as applicable and the Disposition within this note     Time User Action Codes Description Comment    2/5/2018  3:36 PM Romaine ALCANTARA Add [S72 112A] Closed fracture of greater trochanter of left femur ([facility] [address] )     2/5/2018  5:44 PM Rolo Hudson Add [G20] Parkinson disease ([facility] )     2/5/2018  5:44 PM Rolo Hudson Modify Aliyah Bachelor disease ([facility] )     2/5/2018  5:44 PM David Hudsonl Add [E16 2] Hypoglycemia     2/5/2018  5:44 PM YoavaDavidl Modify [E16 2] Hypoglycemia     2/5/2018  5:44 PM AgarrickaDavidl Add [D62] Anemia associated with acute blood loss     2/5/2018  5:44 PM YoavaKahlilRolo Modify [D62] Anemia associated with acute blood loss       ED Disposition     ED Disposition Condition Comment    Admit  Case was discussed with Orthopedics and the patient's admission status was agreed to be Admission Status: inpatient status to the service of Dr Maria Sifuentes   Follow-up Information     Follow up With Specialties Details Why Contact Info    Monique Bledsoe MD Orthopedic Surgery Call in 4 week(s)  [address]      Monique Bledsoe MD Orthopedic Surgery   [address] [place] [address]      Monique Bledsoe MD Orthopedic Surgery   [address] [place] [address]  [phone]          Current Discharge Medication List      START taking these medications    Details   oxyCODONE (ROXICODONE) 5 mg immediate release tablet Take 1 tablet (5 mg total) by mouth every 4 (four) hours as needed for moderate pain for up to 10 days Max Daily Amount: 30 mg  Qty: 30 tablet, Refills: 0    Associated Diagnoses: Closed fracture of greater trochanter of left femur ([facility] )         CONTINUE these medications which have NOT CHANGED    Details   Calcium Carbonate-Vitamin D (OYST-KANDI D PO) Take 1 tablet by mouth daily 500mg OsCal/Calcium       carbidopa-levodopa-entacapone (STALEVO) -200 MG per tablet Take 1 tablet by mouth 5 (five) times a day Indications: Parkinson's Disease with Unknown Cause        Cholecalciferol (VITAMIN D PO) Take 1 tablet by mouth daily        glucosamine 500 MG CAPS capsule Take 1,000 mg by mouth daily        mirtazapine (REMERON) 15 mg tablet Take 1 tablet by mouth daily at bedtime for 30 days  Qty: 30 tablet, Refills: 0      Multiple Vitamins-Minerals (CENTRUM SILVER ADULT 50+ PO) Take by mouth daily  solifenacin (VESICARE) 5 mg tablet Take 5 mg by mouth daily after dinner      CHOLINE FENOFIBRATE PO Take 600 mg by mouth daily      Cyanocobalamin (VITAMIN B-12 PO) Take by mouth  dextrose 50 % Infuse 25 mL into a venous catheter as needed for low blood sugar (for blood glucose <60) for up to 30 days  Qty: 50 mL, Refills: 0      lisinopril (ZESTRIL) 5 mg tablet Take 5 mg by mouth daily         STOP taking these medications       enoxaparin (LOVENOX) 40 mg/0 4 mL Comments:   Reason for Stopping:               Outpatient Discharge Orders  Activity as tolerated     Weight Bearing Restrictions     Call provider for:  severe uncontrolled pain     Call provider for:  redness, tenderness, or signs of infection (pain, swelling, redness, odor or green/yellow discharge around incision site)     Call provider for: active or persistent bleeding     No dressing needed         ED Provider  Attending physically available and evaluated Rubi Mike  I managed the patient along with the ED Attending      Electronically Signed by         Lora Layton MD  02/07/18 7414

## 2018-02-05 NOTE — ED NOTES
Dr Jackie Mahmood at pt bedside    Per Dr Jackie Mahmood pt permitted to take home medication     Malachi Rae RN  15/49/93 0904

## 2018-02-05 NOTE — ED ATTENDING ATTESTATION
Rashid Holliday MD, saw and evaluated the patient  I have discussed the patient with the resident/non-physician practitioner and agree with the resident's/non-physician practitioner's findings, Plan of Care, and MDM as documented in the resident's/non-physician practitioner's note, except where noted  All available labs and Radiology studies were reviewed  At this point I agree with the current assessment done in the Emergency Department  I have conducted an independent evaluation of this patient a history and physical is as follows: Pt presents after witnessed fall in laundry room Pt denies hitting head or loc Pt co l lateral hip femur pain and l foot pain  Witnessed mechanical fall   PE: alert na heart reg lungs clear abd soft nontender tender latral left tigh proximal area and some L foot tenderness good nv  MDM: will xray    Critical Care Time  CritCare Time    Procedures

## 2018-02-06 PROBLEM — S72.112A DISPLACED FRACTURE OF GREATER TROCHANTER OF LEFT FEMUR, INITIAL ENCOUNTER FOR CLOSED FRACTURE (HCC): Status: ACTIVE | Noted: 2018-02-06

## 2018-02-06 LAB
ALBUMIN SERPL BCP-MCNC: 3.2 G/DL (ref 3.5–5)
ALP SERPL-CCNC: 75 U/L (ref 46–116)
ALT SERPL W P-5'-P-CCNC: 8 U/L (ref 12–78)
ANION GAP SERPL CALCULATED.3IONS-SCNC: 4 MMOL/L (ref 4–13)
AST SERPL W P-5'-P-CCNC: 14 U/L (ref 5–45)
BILIRUB SERPL-MCNC: 0.82 MG/DL (ref 0.2–1)
BUN SERPL-MCNC: 22 MG/DL (ref 5–25)
CALCIUM SERPL-MCNC: 8.8 MG/DL (ref 8.3–10.1)
CHLORIDE SERPL-SCNC: 106 MMOL/L (ref 100–108)
CO2 SERPL-SCNC: 30 MMOL/L (ref 21–32)
CREAT SERPL-MCNC: 0.77 MG/DL (ref 0.6–1.3)
ERYTHROCYTE [DISTWIDTH] IN BLOOD BY AUTOMATED COUNT: 14.3 % (ref 11.6–15.1)
GFR SERPL CREATININE-BSD FRML MDRD: 70 ML/MIN/1.73SQ M
GLUCOSE SERPL-MCNC: 81 MG/DL (ref 65–140)
HCT VFR BLD AUTO: 37.9 % (ref 34.8–46.1)
HGB BLD-MCNC: 12.6 G/DL (ref 11.5–15.4)
MCH RBC QN AUTO: 31.7 PG (ref 26.8–34.3)
MCHC RBC AUTO-ENTMCNC: 33.2 G/DL (ref 31.4–37.4)
MCV RBC AUTO: 95 FL (ref 82–98)
PLATELET # BLD AUTO: 164 THOUSANDS/UL (ref 149–390)
PMV BLD AUTO: 11.4 FL (ref 8.9–12.7)
POTASSIUM SERPL-SCNC: 4.3 MMOL/L (ref 3.5–5.3)
PROT SERPL-MCNC: 6.6 G/DL (ref 6.4–8.2)
RBC # BLD AUTO: 3.98 MILLION/UL (ref 3.81–5.12)
SODIUM SERPL-SCNC: 140 MMOL/L (ref 136–145)
WBC # BLD AUTO: 5.21 THOUSAND/UL (ref 4.31–10.16)

## 2018-02-06 PROCEDURE — 97163 PT EVAL HIGH COMPLEX 45 MIN: CPT

## 2018-02-06 PROCEDURE — G8987 SELF CARE CURRENT STATUS: HCPCS

## 2018-02-06 PROCEDURE — 97167 OT EVAL HIGH COMPLEX 60 MIN: CPT

## 2018-02-06 PROCEDURE — G8978 MOBILITY CURRENT STATUS: HCPCS

## 2018-02-06 PROCEDURE — 85027 COMPLETE CBC AUTOMATED: CPT | Performed by: ORTHOPAEDIC SURGERY

## 2018-02-06 PROCEDURE — G8988 SELF CARE GOAL STATUS: HCPCS

## 2018-02-06 PROCEDURE — 99222 1ST HOSP IP/OBS MODERATE 55: CPT | Performed by: ORTHOPAEDIC SURGERY

## 2018-02-06 PROCEDURE — G8979 MOBILITY GOAL STATUS: HCPCS

## 2018-02-06 PROCEDURE — 80053 COMPREHEN METABOLIC PANEL: CPT | Performed by: ORTHOPAEDIC SURGERY

## 2018-02-06 PROCEDURE — 27246 TREAT THIGH FRACTURE: CPT | Performed by: ORTHOPAEDIC SURGERY

## 2018-02-06 RX ADMIN — CARBIDOPA AND LEVODOPA 2 TABLET: 25; 100 TABLET ORAL at 17:52

## 2018-02-06 RX ADMIN — SENNOSIDES 8.6 MG: 8.6 TABLET, FILM COATED ORAL at 09:04

## 2018-02-06 RX ADMIN — ENTACAPONE 200 MG: 200 TABLET, FILM COATED ORAL at 06:17

## 2018-02-06 RX ADMIN — ENTACAPONE 200 MG: 200 TABLET, FILM COATED ORAL at 17:53

## 2018-02-06 RX ADMIN — CARBIDOPA AND LEVODOPA 2 TABLET: 25; 100 TABLET ORAL at 11:24

## 2018-02-06 RX ADMIN — DOCUSATE SODIUM 100 MG: 100 CAPSULE, LIQUID FILLED ORAL at 17:52

## 2018-02-06 RX ADMIN — SODIUM CHLORIDE, SODIUM LACTATE, POTASSIUM CHLORIDE, AND CALCIUM CHLORIDE 75 ML/HR: .6; .31; .03; .02 INJECTION, SOLUTION INTRAVENOUS at 09:44

## 2018-02-06 RX ADMIN — ENOXAPARIN SODIUM 40 MG: 40 INJECTION SUBCUTANEOUS at 09:04

## 2018-02-06 RX ADMIN — TOLTERODINE TARTRATE 2 MG: 2 CAPSULE, EXTENDED RELEASE ORAL at 09:05

## 2018-02-06 RX ADMIN — ENTACAPONE 200 MG: 200 TABLET, FILM COATED ORAL at 11:24

## 2018-02-06 RX ADMIN — CARBIDOPA AND LEVODOPA 2 TABLET: 25; 100 TABLET ORAL at 14:00

## 2018-02-06 RX ADMIN — OXYCODONE HYDROCHLORIDE 5 MG: 5 TABLET ORAL at 09:03

## 2018-02-06 RX ADMIN — ENTACAPONE 200 MG: 200 TABLET, FILM COATED ORAL at 14:01

## 2018-02-06 RX ADMIN — CARBIDOPA AND LEVODOPA 2 TABLET: 25; 100 TABLET ORAL at 06:16

## 2018-02-06 RX ADMIN — DOCUSATE SODIUM 100 MG: 100 CAPSULE, LIQUID FILLED ORAL at 09:04

## 2018-02-06 RX ADMIN — ENTACAPONE 200 MG: 200 TABLET, FILM COATED ORAL at 21:53

## 2018-02-06 RX ADMIN — MIRTAZAPINE 15 MG: 15 TABLET, FILM COATED ORAL at 21:53

## 2018-02-06 RX ADMIN — CARBIDOPA AND LEVODOPA 2 TABLET: 25; 100 TABLET ORAL at 21:53

## 2018-02-06 NOTE — OCCUPATIONAL THERAPY NOTE
[address]  Evaluation     Patient Name: Anshu Campos  HCTAZ'G Date: 2018  Problem List  Patient Active Problem List   Diagnosis    [facility] disease ([facility] [address] )    Hypoglycemia    Femur fracture, right ([facility] [address] [facility] )    DDD (degenerative disc disease), lumbar    Depression with anxiety    Anemia associated with acute blood loss    Displaced fracture of greater trochanter of left femur, initial encounter for closed fracture [facility])     Past Medical History  Past Medical History:   Diagnosis Date    Anxiety     Dementia     Depression     Gastric ulcer     H/O:  section     HLD (hyperlipidemia) 2016    Hypercholesteremia     Parkinson's disease ([facility] [address] [address] )     Raynaud's disease      Past Surgical History  Past Surgical History:   Procedure Laterality Date     SECTION      CHOLECYSTECTOMY      ESOPHAGOGASTRODUODENOSCOPY      KNEE ARTHROSCOPY Left     NE COLONOSCOPY FLX DX W/COLLJ SPEC WHEN PFRMD N/A 3/18/2016    Procedure: COLONOSCOPY;  Surgeon: Portia Meier MD;  [facility]; Service: Gastroenterology    NE ESOPHAGOGASTRODUODENOSCOPY TRANSORAL DIAGNOSTIC N/A 2016    Procedure: ESOPHAGOGASTRODUODENOSCOPY (EGD); Surgeon: Portia Meier MD;  [facility]: [facility]; Service: Gastroenterology    NE OPEN RX FEMUR FX+INTRAMED CHARLEY Right 2017    Procedure: INSERTION NAIL IM FEMUR ANTEGRADE (TROCHANTERIC); Surgeon: Cally Almonte MD;  Location: [facility];  Service: Orthopedics         18 [id number]   Note Type   Note type Eval/Treat   Restrictions/Precautions   Weight Bearing Precautions Per Order Yes   LLE Weight Bearing Per Order WBAT   Other Precautions Cognitive; Chair Alarm; Bed Alarm;WBS;Fall Risk;Pain   Pain Assessment   Pain Assessment No/denies pain   Pain Score No Pain   Home Living   Type [address] One level;Stairs to enter with rails  (1 SUBHASH )   Bathroom Shower/Tub Tub/shower unit   Bathroom Toilet Standard   Bathroom Accessibility Accessible   Home Equipment Walker   Additional Comments USE OF RW AS NEEDED    Prior Function   Level of [place] Needs assistance with ADLs and functional mobility; Needs assistance with IADLs   Lives With Spouse   Receives Help From Family   ADL Assistance Needs assistance   IADLs Needs assistance   Falls in the last 6 months 1 to 4   Vocational Retired   Lifestyle   [employer] PT'S FAMILY PRESENT TO [facility]  PT REQUIRES ASSIST FROM SPOUSE FOR ADLS/IADLS   Reciprocal Relationships LIVES WITH SPOUSE WHO IS HOME AND ABLE TO ASSIST AS NEEDED, HOWEVER IS ELDERLY    Service to Others RETIRED   Intrinsic Gratification ENJOYS SLEEPING    Psychosocial   Psychosocial (WDL) WDL   ADL   Eating Assistance 5  Supervision/Setup   Grooming Assistance 4  Minimal Assistance   UB Bathing Assistance 3  Moderate Assistance   LB Bathing Assistance 2  Maximal Assistance   UB Dressing Assistance 3  Moderate Assistance   LB Dressing Assistance 2  Maximal Assistance   Toileting Assistance  2  Maximal Assistance   Functional Assistance 2  Maximal Assistance   Bed Mobility   Supine to Sit 2  Maximal assistance   Additional items Assist x 2; Increased time required;Verbal cues;LE management   Sit to Supine Unable to assess  (PT LEFT SITTING OOB WITH CHAIR ALARM ON )   Transfers   Sit to Stand 3  Moderate assistance   Additional items Assist x 2; Increased time required;Verbal cues   Stand to Sit 3  Moderate assistance   Additional items Assist x 2; Increased time required;Verbal cues   Stand pivot 3  Moderate assistance   Additional items Assist x 2; Increased time required;Verbal cues   Functional Mobility   Functional Mobility 3  Moderate assistance   Additional Comments A X2 FOR LIMITED MOBILITY/SPT WITH USE OF RW + MAX CUES      Additional items Rolling walker   Balance   Static Sitting Fair   Static Standing Fair -   Ambulatory Poor -   Activity Tolerance   Activity Tolerance Patient limited by fatigue;Patient limited by pain;Treatment limited secondary to medical complications (Comment)   Medical Staff Made Aware CM AWARE OF D/C PLAN   Nurse Made Aware APPROPRIATE TO SEE    RUE Assessment   RUE Assessment WFL   RUE Strength   RUE Overall Strength Within Functional Limits - able to perform ADL tasks with strength   LUE Assessment   LUE Assessment WFL   LUE Strength   LUE Overall Strength Within Functional Limits - able to perform ADL tasks with strength   Hand Function   Gross Motor Coordination Functional   Fine Motor Coordination Functional   Sensation   Light Touch No apparent deficits   Cognition   Overall Cognitive Status Impaired   Arousal/Participation Alert; Cooperative   Attention Attends with cues to redirect   Orientation Level Oriented to person;Oriented to place; Disoriented to time;Disoriented to situation   Memory Decreased recall of precautions;Decreased short term memory;Decreased recall of recent events   Following Commands Follows one step commands without difficulty   Comments PT WITH BASELINE DEMENTIA WITH LIMITED SAFETY AWARENESS/INSIGHT/JUDGEMENT, RECOMMEND ALARM ON PT AT ALL TIMES  Assessment   Limitation Decreased ADL status; Decreased Safe judgement during ADL;Decreased cognition;Decreased endurance;Decreased self-care trans;Decreased high-level ADLs   Prognosis Fair   Assessment 86 YO FEMALE SEEN FOR INITIAL OT EVAL S/P FALL RESULTING IN L FEMUR FX  PT IS PLANNED FOR NON-OP MANAGEMENT AND IS WBAT ON LLE  PT WITH SIGNIFICANT PROBLEMS LIST INCLUDING BASELINE DEMENTIA, PARKINSONS DISEASE, DEPRESSION AND ANXIETY  PT IS FROM HOME WITH SPOUSE WHERE SHE REQUIRES ASSIST FROM SPOUSE FOR ADLS/IADLS  PT CURRENTLY REQUIRES OVERALL MAX A FOR ADLS, MAX A X2 FOR BED MOBILITY, AND MOD A X2 FOR TRANSFERS/LIMITED MOBILITY W/ USE OF RW  PT IS LIMITED 2' PAIN, FATIGUE, IMPAIRED BALANCE, BASELINE DEMENTIA/PD, GENERALIZED WEAKNESS, LIMITED SAFETY AWARENESS/INSIGHT/JUDGEMENT AND LIMITED ACTIVITY TOLERANCE   FROM AN OT PERSPECTIVE, PT WOULD BENEFIT FROM CONT OT SERVICES IN AN INPT REHAB SETTING UPON D/C  WILL CONT TO FOLLOW  Goals   Patient Goals NONE EXPRESSED    LTG Time Frame 10-14   Long Term Goal #1 SEE BELOW    Plan   Treatment Interventions ADL retraining;Functional transfer training; Endurance training;Patient/family training;Cognitive reorientation;Equipment evaluation/education; Compensatory technique education; Activityengagement; Energy conservation   Goal Expiration Date 02/20/18   OT Frequency 3-5x/wk   Recommendation   OT Discharge Recommendation Short Term Rehab   OT - OK to Discharge Yes  (WHEN MEDICALLY CLEARED )   Barthel Index   Feeding 10   Bathing 0   Grooming Score 0   Dressing Score 0   Bladder Score 5   Bowels Score 10   Toilet Use Score 5   Transfers (Bed/Chair) Score 5   Mobility (Level Surface) Score 0   Stairs Score 0   Barthel Index Score 35   Modified Bandon Scale   Modified Lachelle Scale 4       OCCUPATIONAL THERAPY GOALS TO BE MET WITHIN 10-14 DAYS:    -Pt will complete additional cognitive assessment with 100% attention to task in order to assist with safe d/c plan  -Pt will follow 100% simple 2-step commands and be A&O x4 consistently with environmental cues to increase participation in functional activities  -Pt will increase bed mobility to S LEVEL to participate in functional activities with G tolerance and balance  -Pt will improve functional mobility and transfers to S LEVEL on/off all surfaces w/ G balance/safety including toileting   -Pt will participate in lt grooming task with S LEVEL after set-up standing at sink ~3-5 minutes with G safety and balance  -Pt will increase independence in all ADLS to MIN A with G balance sitting upright in chair   -Pt will improve activity tolerance to G for 30 min txment sessions w/ G carry over of learned energy conservation techniques   -Pt/caregiver will be 100% attentive during pt/caregiver training         Documentation completed by Syeda Nunn MOT, OTR/L

## 2018-02-06 NOTE — PLAN OF CARE
Problem: OCCUPATIONAL THERAPY ADULT  Goal: Performs self-care activities at highest level of function for planned discharge setting  See evaluation for individualized goals  Treatment Interventions: ADL retraining, Functional transfer training, Endurance training, Patient/family training, Cognitive reorientation, Equipment evaluation/education, Compensatory technique education, Activityengagement, Energy conservation          See flowsheet documentation for full assessment, interventions and recommendations  Limitation: Decreased ADL status, Decreased Safe judgement during ADL, Decreased cognition, Decreased endurance, Decreased self-care trans, Decreased high-level ADLs  Prognosis: Fair  Assessment: 88 YO FEMALE SEEN FOR INITIAL OT EVAL S/P FALL RESULTING IN L FEMUR FX  PT IS PLANNED FOR NON-OP MANAGEMENT AND IS WBAT ON LLE  PT WITH SIGNIFICANT PROBLEMS LIST INCLUDING BASELINE DEMENTIA, PARKINSONS DISEASE, DEPRESSION AND ANXIETY  PT IS FROM HOME WITH SPOUSE WHERE SHE REQUIRES ASSIST FROM SPOUSE FOR ADLS/IADLS  PT CURRENTLY REQUIRES OVERALL MAX A FOR ADLS, MAX A X2 FOR BED MOBILITY, AND MOD A X2 FOR TRANSFERS/LIMITED MOBILITY W/ USE OF RW  PT IS LIMITED 2' PAIN, FATIGUE, IMPAIRED BALANCE, BASELINE DEMENTIA/PD, GENERALIZED WEAKNESS, LIMITED SAFETY AWARENESS/INSIGHT/JUDGEMENT AND LIMITED ACTIVITY TOLERANCE  FROM AN OT PERSPECTIVE, PT WOULD BENEFIT FROM CONT OT SERVICES IN AN INPT REHAB SETTING UPON D/C  WILL CONT TO FOLLOW        OT Discharge Recommendation: Short Term Rehab  OT - OK to Discharge: Yes ([address] [place] [address] Osteopathy )

## 2018-02-06 NOTE — CONSULTS
[address] Internal Medicine-[place] TEAM Justin Ochoa 80 y o  female MRN: [id number]  [facility] Encounter: [id number]    Code Status: Level 1 - Full Code  POA:      Reason for Admission / Principal Problem: Fall  Reason for Consult: "Discharge clearance"    Impression:  Patient Active Problem List   Diagnosis    [facility] disease ([facility] )    Hypoglycemia    Femur fracture, right ([facility] )    DDD (degenerative disc disease), lumbar    Depression with anxiety    Anemia associated with acute blood loss    Displaced fracture of greater trochanter of left femur, initial encounter for closed fracture ([facility] [address] )       A/P:    Mechanical fall   In the setting of ambulatory dysfunction, Parkinson's disease, and prior falls at home  However, patient denies a recent fall before this current episode  · Per Orthopedic surgery, no surgical intervention at this time  · Weightbearing as tolerated  · Recommend physical therapy evaluation, placement  · Pain control per Orthopedic Service    Parkinson's disease  · Patient without tremors on physical exam  · Recommend continuing home regimen of carbidopa-levodopa-entacapone  · PT eval    Urinary incontinence  · Likely urge type incontinence  · Continue VESIcare tablet daily    Constipation  · Continue Senokot tablet daily  · Add on MiraLax or Colace if needed    Hypertension  · Patient initially with systolic blood pressure in the 90s  Lisinopril was not reordered on admission  · Currently blood pressure is within acceptable limits  · Do not need to restart antihypertensives at this point  · Can restart home dose of lisinopril if needed    Patient medically stable at this point  She has no anemia or major abnormalities requiring intervention on her lab workup    Not in any apparent distress on exam   Patient is medically stable from a medicine standpoint for discharge to a facility  No other intervention per Internal Medicine  We will sign off  Please call with any questions  HPI: Tisha Zimmer is a 80 y o  female who presents with a mechanical fall witnessed at home yesterday  She has a  medical history of Parkinson's disease and ambulatory dysfunction for which she uses a walker  Patient states that she was at home and had gotten up to walk, but was not using her walker, and then she tripped and fell to her side  She did not hit her head or lose consciousness, however, it became increasingly difficult for her to bear weight on her left leg secondary to pain  She denies chest pain or shortness of breath or dizziness at the time of the fall, although she has somewhat poor recall of the event  In the ED, CT scan of the pelvis showed a fracture of the greater trochanter of the left proximal femur with small volume of some pelvic free fluid  Patient was admitted to the orthopedic service, and a consult was placed to Internal Medicine for clearance to be able to discharge to a facility  She is otherwise alert and oriented to person, place, and year  She is currently not complaining of any other acute issues at this point aside from her chronic ambulatory dysfunction and urinary incontinence  Currently, her vital signs are stable and she has good tremor control with her Parkinson's medication      History obtained from patient and chart    PMH:  Past Medical History:   Diagnosis Date    Anxiety     Dementia     Depression     Gastric ulcer     H/O:  section     HLD (hyperlipidemia) 2016    Hypercholesteremia     [facility] [address] )     Raynaud's disease         PSH:  Past Surgical History:   Procedure Laterality Date     SECTION      CHOLECYSTECTOMY      ESOPHAGOGASTRODUODENOSCOPY      KNEE ARTHROSCOPY Left     [place] COLONOSCOPY FLX DX W/COLLJ SPEC WHEN PFRMD N/A 3/18/2016    Procedure: COLONOSCOPY;  Surgeon: Sylvester Bruno MD; Location: [facility]; Service: Gastroenterology    [place] ESOPHAGOGASTRODUODENOSCOPY TRANSORAL DIAGNOSTIC N/A 2/24/2016    Procedure: ESOPHAGOGASTRODUODENOSCOPY (EGD); Surgeon: Zac Austin MD;  [facility]: [facility]; Service: Gastroenterology    [place] OPEN RX FEMUR FX+INTRAMED CHARLEY Right 2/23/2017    Procedure: INSERTION NAIL IM FEMUR ANTEGRADE (TROCHANTERIC); Surgeon: Josephine Edmondson MD;  [facility];  Service: Orthopedics       Allergies: Allergies   Allergen Reactions    Penicillins      Unknown reaction  Does not remember when last had it       Family History: History reviewed  No pertinent family history  Social History:   History   Smoking Status    Never Smoker   Smokeless Tobacco    Never Used      History   Alcohol Use    0 6 oz/week    1 Glasses of wine per week     Comment: social      History   Drug Use No        Home Medications:   Prior to Admission medications    Medication Sig Start Date End Date Taking? Authorizing Provider   Calcium Carbonate-Vitamin D (OYST-KANDI D PO) Take 1 tablet by mouth daily 500mg OsCal/Calcium    Yes Historical Provider, MD   carbidopa-levodopa-entacapone (STALEVO) -200 MG per tablet Take 1 tablet by mouth 5 (five) times a day Indications: Parkinson's Disease with Unknown Cause  Yes Historical Provider, MD   Cholecalciferol (VITAMIN D PO) Take 1 tablet by mouth daily     Yes Historical Provider, MD   glucosamine 500 MG CAPS capsule Take 1,000 mg by mouth daily     Yes Historical Provider, MD   mirtazapine (REMERON) 15 mg tablet Take 1 tablet by mouth daily at bedtime for 30 days 3/6/17 2/5/18 Yes Zora Nguyen MD   Multiple Vitamins-Minerals (CENTRUM SILVER ADULT 50+ PO) Take by mouth daily     Yes Historical Provider, MD   solifenacin (VESICARE) 5 mg tablet Take 5 mg by mouth daily after dinner   Yes Historical Provider, MD   CHOLINE FENOFIBRATE PO Take 600 mg by mouth daily    Historical Provider, MD   Cyanocobalamin (VITAMIN B-12 PO) Take by mouth     Historical Provider, MD   dextrose 50 % Infuse 25 mL into a venous catheter as needed for low blood sugar (for blood glucose <60) for up to 30 days 3/6/17 4/5/17  Antonio Albrecht MD   enoxaparin (LOVENOX) 40 mg/0 4 mL Inject 0 4 mL under the skin every 24 hours for 17 days 3/6/17 3/23/17  Antonio Albrecht MD   lisinopril (ZESTRIL) 5 mg tablet Take 5 mg by mouth daily    Historical Provider, MD       ROS:   Review of Systems   Constitutional: Negative for chills, diaphoresis and fever  HENT: Negative  Eyes: Negative for visual disturbance  Respiratory: Negative  Cardiovascular: Negative for chest pain  Gastrointestinal: Negative for abdominal pain, constipation and diarrhea  Endocrine: Negative for polyuria  Genitourinary: Negative for dysuria and hematuria  Incontinence   Musculoskeletal: Negative for arthralgias and myalgias  Skin: Negative  Allergic/Immunologic: Negative for immunocompromised state  Neurological: Negative for dizziness, seizures, facial asymmetry, light-headedness, numbness and headaches  Hematological: Negative for adenopathy  Psychiatric/Behavioral: Negative for confusion  Vitals:   Vitals:    02/05/18 2248 02/05/18 2330 02/06/18 0300 02/06/18 0748   BP: (!) 177/72 147/66 164/72 125/58   BP Location: Right arm Right arm Right arm    Pulse: 55  (!) 53 55   Resp: 17 17 18   Temp: 98 2 °F (36 8 °C)  (!) 97 4 °F (36 3 °C) 98 2 °F (36 8 °C)   TempSrc: Oral  Oral    SpO2: 94%  96% 93%   Weight:       Height:         Temp  Min: 97 4 °F (36 3 °C)  Max: 98 7 °F (37 1 °C)  IBW: 57 kg  Body mass index is 20 14 kg/m²  PHYSICAL EXAM:  Physical Exam   Constitutional: She is oriented to person, place, and time  She appears well-developed and well-nourished  No distress  HENT:   Head: Normocephalic and atraumatic  Nose: Nose normal    Mouth/Throat: Oropharynx is clear and moist  No oropharyngeal exudate     Eyes: Conjunctivae and EOM are normal  Pupils are equal, round, and reactive to light  Neck: Normal range of motion  Neck supple  Cardiovascular: Normal rate, regular rhythm, normal heart sounds and intact distal pulses  Pulmonary/Chest: Effort normal and breath sounds normal    Abdominal: Soft  Bowel sounds are normal    Musculoskeletal: Normal range of motion  She exhibits no edema  Neurological: She is alert and oriented to person, place, and time  Skin: Skin is warm and dry  She is not diaphoretic  Psychiatric: She has a normal mood and affect  Vitals reviewed  Labs:     Results from last 7 days  Lab Units 02/06/18  0522 02/05/18  1300   WBC Thousand/uL 5 21 6 15   HEMOGLOBIN g/dL 12 6 12 6   HEMATOCRIT % 37 9 37 6   PLATELETS Thousands/uL 164 153   NEUTROS PCT %  --  73   MONOS PCT %  --  7       Results from last 7 days  Lab Units 02/06/18  0522 02/05/18  1259   SODIUM mmol/L 140 138   POTASSIUM mmol/L 4 3 4 0   CHLORIDE mmol/L 106 104   CO2 mmol/L 30 30   BUN mg/dL 22 25   CREATININE mg/dL 0 77 0 71   CALCIUM mg/dL 8 8 8 6   TOTAL PROTEIN g/dL 6 6  --    BILIRUBIN TOTAL mg/dL 0 82  --    ALK PHOS U/L 75  --    ALT U/L 8*  --    AST U/L 14  --    GLUCOSE RANDOM mg/dL 81 98         No results found for: PHOS       No results found for: TROPONINT  ABG:No results found for: PHART, MEL2AXO, PO2ART, VDW0UXE, W1CTVWOG, BEART, SOURCE    Imaging: I have personally reviewed pertinent reports  EKG: This was personally reviewed by myself     Micro:  Blood Culture: No results found for: BLOODCX  Urine Culture:   Lab Results   Component Value Date    URINECX >100,000 cfu/ml Salmonella species 05/17/2017    URINECX >100,000 cfu/ml Proteus mirabilis 02/26/2017    URINECX No Growth <1000 cfu/mL 03/01/2016     Sputum Culture: No components found for: SPUTUMCX  Wound Culure: No results found for: WOUNDCULT    VTE Pharmacologic Prophylaxis: Enoxaparin (Lovenox)  VTE Mechanical Prophylaxis: sequential compression device    Anita Mckoy DO  2/6/2018 8:33 AM

## 2018-02-06 NOTE — PHYSICAL THERAPY NOTE
Physical Therapy Evaluation    Patient's Name:Melissa Cuenca    Today's Date:18    Patient Active Problem List   Diagnosis    Parkinson disease ([facility] )    Hypoglycemia    Femur fracture, right ([facility] )    DDD (degenerative disc disease), lumbar    Depression with anxiety    Anemia associated with acute blood loss    Displaced fracture of greater trochanter of left femur, initial encounter for closed fracture [facility])       Past Medical History:   Diagnosis Date    Anxiety     Dementia     Depression     Gastric ulcer     H/O:  section     HLD (hyperlipidemia) 2016    Hypercholesteremia     Parkinson's disease ([facility] )     Raynaud's disease        Past Surgical History:   Procedure Laterality Date     SECTION      CHOLECYSTECTOMY      ESOPHAGOGASTRODUODENOSCOPY      KNEE ARTHROSCOPY Left     FL COLONOSCOPY FLX DX W/COLLJ SPEC WHEN PFRMD N/A 3/18/2016    Procedure: COLONOSCOPY;  Surgeon: Alo Reyes MD;  [facility]; Service: Gastroenterology    FL ESOPHAGOGASTRODUODENOSCOPY TRANSORAL DIAGNOSTIC N/A 2016    Procedure: ESOPHAGOGASTRODUODENOSCOPY (EGD); Surgeon: Alo Reyes MD;  [facility]; Service: Gastroenterology    FL OPEN RX FEMUR FX+INTRAMED CHARLEY Right 2017    Procedure: INSERTION NAIL IM FEMUR ANTEGRADE (TROCHANTERIC);   Surgeon: Keri Valverde MD;  [facility]: [facility];  Service: Orthopedics          18 1430   Pain Assessment   Pain Assessment No/denies pain   Hospital Pain Intervention(s) Ambulation/increased activity   Response to Interventions unchanged   Home Living   Type of [address] level   Prior Function   Level of [place] (indep w amb assisted w care)   Lives With Spouse   Receives Help From Family   Restrictions/Precautions   Weight Bearing Precautions Per Order Yes   LLE Weight Bearing Per Order WBAT   General   Family/Caregiver Present Yes   Cognition   Arousal/Participation Alert   Orientation Level Oriented to person   RUE Assessment   RUE Assessment (funct reach and grasp)   LUE Assessment   LUE Assessment (funct reach and grasp)   RLE Assessment   RLE Assessment X   Strength RLE   RLE Overall Strength (2/5)   LLE Assessment   LLE Assessment X   Strength LLE   LLE Overall Strength 3/5   Coordination   Movements are Fluid and Coordinated 0   Coordination and Movement Description antalgic LE instability   Bed Mobility   Supine to Sit 2  Maximal assistance   Additional items Assist x 2;HOB elevated; Increased time required;Verbal cues;LE management   Transfers   Sit to Stand 3  Moderate assistance   Additional items Assist x 2; Increased time required;Verbal cues   Stand to Sit 3  Moderate assistance   Additional items Increased time required;Verbal cues   Stand pivot 3  Moderate assistance   Additional items Increased time required;Verbal cues  (RW)   Ambulation/Elevation   Gait pattern Improper Weight shift; Antalgic;Narrow BIENVENIDO; Decreased foot clearance;Shuffling;Excessively slow   Gait Assistance 3  Moderate assist   Additional items Assist x 2;Verbal cues; Tactile cues   Assistive Device Rolling walker   Distance 2 ft bed-chair   Balance   Dynamic Sitting Fair -  (forward reach)   Static Standing Fair -  (RW)   Dynamic Standing Poor  (RW)   Endurance Deficit   Endurance Deficit Yes   Endurance Deficit Description antalgic LE instability   Activity Tolerance   Activity Tolerance Patient limited by pain   Nurse Made Aware Sue   Assessment   Prognosis Good   Problem List Decreased strength;Decreased range of motion;Decreased endurance; Impaired balance;Decreased mobility; Decreased coordination;Decreased cognition; Impaired judgement;Decreased safety awareness;Pain;Orthopedic restrictions   Assessment Pt is a 80 y o  female admitted to [facility] on 2/5/2018 w/ Displaced fracture of greater trochanter of left femur, initial encounter for closed fracture ([address] ); currently non-op mgt WBAT LLE Pt exhibits significant impairments with weakness, decreased ROM, impaired balance, decreased endurance, impaired coordination, gait deviations, pain, decreased activity tolerance, decreased functional mobility tolerance, decreased safety awareness, impaired judgement, fall risk, orthopedic restrictions and decreased cognition; these impact independence with mobility, ADLs, and IADLs; requires mod assistx2  w transf and amb 2ft using RW LLE WBAT; objective measures on the Barthel Index also reveal limitations;  therapy prognosis is impacted by relevant co morbidities as noted in evaluation; clinical presentation is currently unstable/unpredictable - pt is on cont pulse oximetry, presents w complex med co morbidities and phys impairments as noted a significant regression from baseline ; PTA, pt was Independent with mobility assisted w all care, lives in single level setup; spouse assisted w care however currently he is pend pacer implant on 2/8/18 skilled PT is indicated to to optimize functional independence and discharge planning; pending functional progress, PT recommendation at discharge is IP rehab    Goals   Patient Goals not expressed   STG Expiration Date 02/20/18   Short Term Goal #1 1  Minimum assist with Bed Mobility Rolling Right and Left     2  Minimum assist with Bed Mobility Supine-Sit     3  Minimum assist with Transfer Bed-Chair     4  Increase Dynamic Sitting Balance at least 1 Grade for improved stability with functional reach activities     5  Increase Dynamic Standing Balance at least 1 Grade for improved ease with Activities of Daily Living     6  Increase Lower Extremity Strength at least 1 Grade for improved ease mobility tasks     7  Minimum assist with  Ambulation 50 feet using RW LLE WBAT to facilitate home and community mobility 8  Minimum assist with Ascending/Descending 6 steps to facilitate home and community accessibility     Plan   Treatment/Interventions Functional transfer training;TANK strengthening/ROM; Elevations; Therapeutic exercise; Endurance training;Cognitive reorientation; Bed mobility; Equipment eval/education;Patient/family training;Gait training;Family; Compensatory technique education;Continued evaluation;Spoke to nursing;Spoke to case management;OT   PT Frequency (6x/wk)   Recommendation   Recommendation Post acute IP rehab   Equipment Recommended Walker   PT - OK to Discharge Yes   Barthel Index   Feeding 10   Bathing 0   Grooming Score 0   Dressing Score 0   Bladder Score 10   Bowels Score 10   Toilet Use Score 5   Transfers (Bed/Chair) Score 5   Mobility (Level Surface) Score 0   Stairs Score 0   Barthel Index Score 40

## 2018-02-06 NOTE — PROGRESS NOTES
Pt care rounding with Ollie from Ortho- plan is for patient to be assessed by p/t and o/t   Also pain control

## 2018-02-06 NOTE — PLAN OF CARE
Problem: Potential for Falls  Goal: Patient will remain free of falls  INTERVENTIONS:  - Assess patient frequently for physical needs  -  Identify cognitive and physical deficits and behaviors that affect risk of falls  -  Igo fall precautions as indicated by assessment   - Educate patient/family on patient safety including physical limitations  - Instruct patient to call for assistance with activity based on assessment  - Modify environment to reduce risk of injury  - Consider OT/PT consult to assist with strengthening/mobility   Outcome: Adequate for Discharge      Problem: SAFETY ADULT  Goal: Patient will remain free of falls  INTERVENTIONS:  - Assess patient frequently for physical needs  -  Identify cognitive and physical deficits and behaviors that affect risk of falls    -  Igo fall precautions as indicated by assessment   - Educate patient/family on patient safety including physical limitations  - Instruct patient to call for assistance with activity based on assessment  - Modify environment to reduce risk of injury  - Consider OT/PT consult to assist with strengthening/mobility   Outcome: Adequate for Discharge

## 2018-02-06 NOTE — PLAN OF CARE

## 2018-02-06 NOTE — SOCIAL WORK
Cm met with patient to review role of Cm  Patient presented as alert during conversation  Patient reported that she lives with her , Jyoti Pillai, [address], in a 2 story home with no steps to enter and a 1st floor set up  Patient reported that she was at [address] [place] [address] in the past, and in agreement with a referral back as recommended by PT/OT  Patient denied having in inpatient , or substance abuse treatment  Patient stated that she had [facility] a few years ago from [facility] services  Per patient's , patient has no prescription insurance  Patient's PCP is associated with  physician group  Again, patient in agreement with a referral to [address] [place] [address] for recommended rehab  Referral sent  CM reviewed d/c planning process including the following: identifying help at home, patient preference for d/c planning needs, Discharge Lounge, [facility] Med[facility] to Bed program, availability of treatment team to discuss questions or concerns patient and/or family may have regarding understanding medications and recognizing signs and symptoms once discharged  CM also encouraged patient to follow up with all recommended appointments after discharge  Patient advised of importance for patient and family to participate in managing patients medical well being

## 2018-02-06 NOTE — PROGRESS NOTES
Orthopedics   Emy Graver 80 y o  female MRN: [id number]  [facility]    Subjective:   80 y  o female with left hip greater trochanteric fracture  No events overnight pain controlled         Labs:    0  Lab Value Date/Time   HCT 37 9 02/06/2018 0522   HCT 37 6 02/05/2018 1300   HCT 45 6 12/07/2017 0706   HCT 44 2 11/21/2015 0930   HCT 41 2 05/06/2015 1626   HCT 31 3 (L) 04/07/2015 1946   HGB 12 6 02/06/2018 0522   HGB 12 6 02/05/2018 1300   HGB 15 0 12/07/2017 0706   HGB 14 7 11/21/2015 0930   HGB 12 7 05/06/2015 1626   HGB 9 7 (L) 04/07/2015 1946   INR 1 05 02/23/2017 0432   WBC 5 21 02/06/2018 0522   WBC 6 15 02/05/2018 1300   WBC 3 25 (L) 12/07/2017 0706   WBC 5 06 11/21/2015 0930   WBC 4 00 (L) 05/06/2015 1626   WBC 7 16 04/07/2015 1946   ESR 14 06/30/2016 0839       Meds:    Current Facility-Administered Medications:     acetaminophen (TYLENOL) tablet 650 mg, 650 mg, Oral, Q4H PRN, Rolo Hudson MD    entacapone (COMTAN) tablet 200 mg, 200 mg, Oral, 5x Daily, 200 mg at 02/06/18 0617 **AND** carbidopa-levodopa (SINEMET)  mg per tablet 2 tablet, 2 tablet, Oral, 5x Daily, Rolo Hudson MD, 2 tablet at 02/06/18 0616    docusate sodium (COLACE) capsule 100 mg, 100 mg, Oral, BID, Rolo Hudson MD, 100 mg at 02/05/18 2021    enoxaparin (LOVENOX) subcutaneous injection 40 mg, 40 mg, Subcutaneous, Daily, Rolo Hudson MD    lactated ringers infusion, 75 mL/hr, Intravenous, Continuous, Rolo Hudson MD, Last Rate: 75 mL/hr at 02/05/18 2021, 75 mL/hr at 02/05/18 2021    mirtazapine (REMERON) tablet 15 mg, 15 mg, Oral, HS, Rolo Hudson MD, 15 mg at 02/05/18 2126    morphine injection 2 mg, 2 mg, Intravenous, Q1H PRN, Rolo Hudson MD    ondansetron (ZOFRAN) injection 4 mg, 4 mg, Intravenous, Q6H PRN, Rolo Hudson MD    oxyCODONE (ROXICODONE) immediate release tablet 10 mg, 10 mg, Oral, Q4H PRN, Rolo Hudson MD    oxyCODONE (ROXICODONE) IR tablet 5 mg, 5 mg, Oral, Q4H PRN, Rolo Hudson MD    senna (SENOKOT) tablet 8 6 mg, 1 tablet, Oral, Daily, Rolo Hudson MD    tolterodine (DETROL LA) 24 hr capsule 2 mg, 2 mg, Oral, Daily, Sree Khanna PA-C    Blood Culture:   No results found for: BLOODCX    Wound Culture:   No results found for: WOUNDCULT    Ins and Outs:  I/O last 24 hours: In: 1000 [IV Piggyback:1000]  Out: -           Physical Exam:   /72 (BP Location: Right arm)   Pulse (!) 53   Temp (!) 97 4 °F (36 3 °C) (Oral)   Resp 17   Ht 5' 5" (1 651 m)   Wt 54 9 kg (121 lb)   LMP  (LMP Unknown)   SpO2 96%   BMI 20 14 kg/m²   Musculoskeletal: left lower extremity  · Skin intact  · Painful range of motion of hip joint  · Sensation intact L1-S1  · +fhl/ehl/ta/gsc  · 2+ DP pulse    _*_*_*_*_*_*_*_*_*_*_*_*_*_*_*_*_*_*_*_*_*_*_*_*_*_*_*_*_*_*_*_*_*_*_*_*_*_*_*_*_*    Assessment:  80 y  o female with left hip greater trochanteric fracture    Plan:   · WBAT left lower extremity  · Non op mgmt of fracture  · PT  · Pain control  · Encourage ambulation, may require DVT prophylaxis if bed bound for extensive period, mechanical DVT prophylaxis currently  · Ortho signing off      Vidya Ochoa MD

## 2018-02-06 NOTE — PLAN OF CARE
Problem: PHYSICAL THERAPY ADULT  Goal: Performs mobility at highest level of function for planned discharge setting  See evaluation for individualized goals  Treatment/Interventions: Functional transfer training, LE strengthening/ROM, Elevations, Therapeutic exercise, Endurance training, Cognitive reorientation, Bed mobility, Equipment eval/education, Patient/family training, Gait training, Family, Compensatory technique education, Continued evaluation, Spoke to nursing, Spoke to case management, OT  Equipment Recommended: Angely Brand       See flowsheet documentation for full assessment, interventions and recommendations    Outcome: Progressing  Prognosis: Good  Problem List: Decreased strength, Decreased range of motion, Decreased endurance, Impaired balance, Decreased mobility, Decreased coordination, Decreased cognition, Impaired judgement, Decreased safety awareness, Pain, Orthopedic restrictions  Assessment: Pt is a 80 y o  female admitted to [facility] on 2/5/2018 w/ Displaced fracture of greater trochanter of left femur, initial encounter for closed fracture ([facility] ); currently non-op mgt WBAT LLE Pt exhibits significant impairments with weakness, decreased ROM, impaired balance, decreased endurance, impaired coordination, gait deviations, pain, decreased activity tolerance, decreased functional mobility tolerance, decreased safety awareness, impaired judgement, fall risk, orthopedic restrictions and decreased cognition; these impact independence with mobility, ADLs, and IADLs; requires mod assistx2  w transf and amb 2ft using RW LLE WBAT; objective measures on the Barthel Index also reveal limitations;  therapy prognosis is impacted by relevant co morbidities as noted in evaluation; clinical presentation is currently unstable/unpredictable - pt is on cont pulse oximetry, presents w complex med co morbidities and phys impairments as noted a significant regression from baseline ; PTA, pt was Independent with mobility assisted w all care, lives in single level setup; spouse assisted w care however currently he is pend pacer implant on 2/8/18 skilled PT is indicated to to optimize functional independence and discharge planning; pending functional progress, PT recommendation at discharge is IP rehab         Recommendation: Post acute IP rehab     PT - OK to Discharge: Yes    See flowsheet documentation for full assessment

## 2018-02-07 PROCEDURE — 97535 SELF CARE MNGMENT TRAINING: CPT

## 2018-02-07 PROCEDURE — 97110 THERAPEUTIC EXERCISES: CPT

## 2018-02-07 PROCEDURE — 99024 POSTOP FOLLOW-UP VISIT: CPT | Performed by: ORTHOPAEDIC SURGERY

## 2018-02-07 PROCEDURE — 97530 THERAPEUTIC ACTIVITIES: CPT

## 2018-02-07 RX ORDER — OXYCODONE HYDROCHLORIDE 5 MG/1
5 TABLET ORAL EVERY 4 HOURS PRN
Qty: 30 TABLET | Refills: 0 | Status: SHIPPED | OUTPATIENT
Start: 2018-02-07 | End: 2018-02-17

## 2018-02-07 RX ADMIN — ENTACAPONE 200 MG: 200 TABLET, FILM COATED ORAL at 06:14

## 2018-02-07 RX ADMIN — ENOXAPARIN SODIUM 40 MG: 40 INJECTION SUBCUTANEOUS at 08:35

## 2018-02-07 RX ADMIN — CARBIDOPA AND LEVODOPA 2 TABLET: 25; 100 TABLET ORAL at 06:14

## 2018-02-07 RX ADMIN — CARBIDOPA AND LEVODOPA 2 TABLET: 25; 100 TABLET ORAL at 18:14

## 2018-02-07 RX ADMIN — DOCUSATE SODIUM 100 MG: 100 CAPSULE, LIQUID FILLED ORAL at 18:14

## 2018-02-07 RX ADMIN — OXYCODONE HYDROCHLORIDE 10 MG: 10 TABLET ORAL at 23:33

## 2018-02-07 RX ADMIN — MIRTAZAPINE 15 MG: 15 TABLET, FILM COATED ORAL at 21:54

## 2018-02-07 RX ADMIN — ENTACAPONE 200 MG: 200 TABLET, FILM COATED ORAL at 18:14

## 2018-02-07 RX ADMIN — DOCUSATE SODIUM 100 MG: 100 CAPSULE, LIQUID FILLED ORAL at 08:35

## 2018-02-07 RX ADMIN — ENTACAPONE 200 MG: 200 TABLET, FILM COATED ORAL at 11:53

## 2018-02-07 RX ADMIN — ENTACAPONE 200 MG: 200 TABLET, FILM COATED ORAL at 13:38

## 2018-02-07 RX ADMIN — CARBIDOPA AND LEVODOPA 2 TABLET: 25; 100 TABLET ORAL at 13:38

## 2018-02-07 RX ADMIN — CARBIDOPA AND LEVODOPA 2 TABLET: 25; 100 TABLET ORAL at 21:54

## 2018-02-07 RX ADMIN — TOLTERODINE TARTRATE 2 MG: 2 CAPSULE, EXTENDED RELEASE ORAL at 08:33

## 2018-02-07 RX ADMIN — CARBIDOPA AND LEVODOPA 2 TABLET: 25; 100 TABLET ORAL at 11:53

## 2018-02-07 RX ADMIN — ENTACAPONE 200 MG: 200 TABLET, FILM COATED ORAL at 21:55

## 2018-02-07 RX ADMIN — OXYCODONE HYDROCHLORIDE 5 MG: 5 TABLET ORAL at 08:34

## 2018-02-07 RX ADMIN — SENNOSIDES 8.6 MG: 8.6 TABLET, FILM COATED ORAL at 08:33

## 2018-02-07 NOTE — PROGRESS NOTES
Orthopedics   Rosalba Teran 80 y o  female MRN: [id number]  [facility]    Subjective:   80 y  o female with left hip greater trochanteric fracture  No events overnight pain controlled         Labs:    0  Lab Value Date/Time   HCT 37 9 02/06/2018 0522   HCT 37 6 02/05/2018 1300   HCT 45 6 12/07/2017 0706   HCT 44 2 11/21/2015 0930   HCT 41 2 05/06/2015 1626   HCT 31 3 (L) 04/07/2015 1946   HGB 12 6 02/06/2018 0522   HGB 12 6 02/05/2018 1300   HGB 15 0 12/07/2017 0706   HGB 14 7 11/21/2015 0930   HGB 12 7 05/06/2015 1626   HGB 9 7 (L) 04/07/2015 1946   INR 1 05 02/23/2017 0432   WBC 5 21 02/06/2018 0522   WBC 6 15 02/05/2018 1300   WBC 3 25 (L) 12/07/2017 0706   WBC 5 06 11/21/2015 0930   WBC 4 00 (L) 05/06/2015 1626   WBC 7 16 04/07/2015 1946   ESR 14 06/30/2016 0839       Meds:    Current Facility-Administered Medications:     acetaminophen (TYLENOL) tablet 650 mg, 650 mg, Oral, Q4H PRN, Rolo Hudson MD    entacapone (COMTAN) tablet 200 mg, 200 mg, Oral, 5x Daily, 200 mg at 02/07/18 0614 **AND** carbidopa-levodopa (SINEMET)  mg per tablet 2 tablet, 2 tablet, Oral, 5x Daily, Rolo Hudson MD, 2 tablet at 02/07/18 0614    docusate sodium (COLACE) capsule 100 mg, 100 mg, Oral, BID, Rolo Hudson MD, 100 mg at 02/06/18 1752    enoxaparin (LOVENOX) subcutaneous injection 40 mg, 40 mg, Subcutaneous, Daily, Rolo Hudson MD, 40 mg at 02/06/18 0904    lactated ringers infusion, 75 mL/hr, Intravenous, Continuous, Rolo Hudson MD, Stopped at 02/06/18 1927    mirtazapine (REMERON) tablet 15 mg, 15 mg, Oral, HS, Rolo Hudson MD, 15 mg at 02/06/18 2150    morphine injection 2 mg, 2 mg, Intravenous, Q1H PRN, Rolo Hudson MD    ondansetron (ZOFRAN) injection 4 mg, 4 mg, Intravenous, Q6H PRN, Rolo Hudson MD    oxyCODONE (ROXICODONE) immediate release tablet 10 mg, 10 mg, Oral, Q4H PRN, Rolo Hudson MD    oxyCODONE (ROXICODONE) IR tablet 5 mg, 5 mg, Oral, Q4H PRN, Rolo Gabi Mukherjee MD, 5 mg at 02/06/18 0903    senna (SENOKOT) tablet 8 6 mg, 1 tablet, Oral, Daily, Rolo Hudson MD, 8 6 mg at 02/06/18 0904    tolterodine (DETROL LA) 24 hr capsule 2 mg, 2 mg, Oral, Daily, Yesenia Ritter PA-C, 2 mg at 02/06/18 3836    Blood Culture:   No results found for: BLOODCX    Wound Culture:   No results found for: WOUNDCULT    Ins and Outs:  I/O last 24 hours: In: 2208 8 [P O :480; I V :1728 8]  Out: 1086 [Urine:1086]          Physical Exam:   /60 (BP Location: Right arm)   Pulse 60   Temp 99 °F (37 2 °C) (Oral)   Resp 17   Ht 5' 5" (1 651 m)   Wt 54 9 kg (121 lb)   LMP  (LMP Unknown)   SpO2 96%   BMI 20 14 kg/m²   Musculoskeletal: left lower extremity  · Skin intact at injury  · Painful range of motion of hip joint  · Does not comply with motor/sensory exam this morning  · 2+ DP pulse    _*_*_*_*_*_*_*_*_*_*_*_*_*_*_*_*_*_*_*_*_*_*_*_*_*_*_*_*_*_*_*_*_*_*_*_*_*_*_*_*_*    Assessment:  80 y  o female with left hip greater trochanteric fracture, not indicated for operative intervention    Plan:   · WBAT left lower extremity  · Non op mgmt of fracture  · PT  · Pain control  · Encourage ambulation, may require DVT prophylaxis if bed bound for extensive period, mechanical DVT prophylaxis currently  · Dispo: pending placement      Albert Adilson   02/07/18

## 2018-02-07 NOTE — PLAN OF CARE
Problem: PHYSICAL THERAPY ADULT  Goal: Performs mobility at highest level of function for planned discharge setting  See evaluation for individualized goals  Treatment/Interventions: Functional transfer training, LE strengthening/ROM, Elevations, Therapeutic exercise, Endurance training, Cognitive reorientation, Bed mobility, Equipment eval/education, Patient/family training, Gait training, Family, Compensatory technique education, Continued evaluation, Spoke to nursing, Spoke to case management, OT  Equipment Recommended: Stefany Lopez       See flowsheet documentation for full assessment, interventions and recommendations  Outcome: Progressing  Prognosis: Fair  Problem List: Decreased strength, Decreased range of motion, Decreased endurance, Impaired balance, Decreased mobility, Decreased coordination, Decreased cognition, Impaired judgement, Decreased safety awareness, Pain, Orthopedic restrictions  Assessment: Pt cooperative t/o PT session  Pt required a heavy modA to transition to EOB and perform sit<>stand, stand-pivot toward bedside chair  After assisting pt to chair she requested to return to bed  W/ encouragement pt agreeable to try sitting in chair for dinner  Pt continues to be at very high risk for fall and would benefit from IP rehab  Will follow  Recommendation: Post acute IP rehab     PT - OK to Discharge: Yes    See flowsheet documentation for full assessment

## 2018-02-07 NOTE — PLAN OF CARE
Problem: OCCUPATIONAL THERAPY ADULT  Goal: Performs self-care activities at highest level of function for planned discharge setting  See evaluation for individualized goals  Treatment Interventions: ADL retraining, Functional transfer training, Endurance training, Patient/family training, Cognitive reorientation, Equipment evaluation/education, Compensatory technique education, Activityengagement, Energy conservation          See flowsheet documentation for full assessment, interventions and recommendations  Outcome: Progressing  Limitation: Decreased ADL status, Decreased Safe judgement during ADL, Decreased cognition, Decreased endurance, Decreased self-care trans, Decreased high-level ADLs  Prognosis: Fair  Assessment: pt particiapted in pm ot session and was seen focusing on bed mobiltiy, sit to stand from recliner chair, spt with rw chair to bed and standing balance/ activity tolerence  pt was oob most of am  pt with right sided lean when seated in ercliner and when supine btb needning pillows to adjust balance  pt perfomred spt from ercliner to bed wtowards pts right       OT Discharge Recommendation: Short Term Rehab  OT - OK to Discharge: Yes ([address] [place] [address] Osteopathy )  Jessica Monaco

## 2018-02-07 NOTE — PHYSICAL THERAPY NOTE
PHYSICAL THERAPY NOTE      Patient Name: Aime Anaya  [id number] Date: 2/7/2018 02/07/18 1500   Pain Assessment   Pain Assessment 0-10   Pain Score 3   Pain Type Acute pain   Pain Location Hip   Pain Orientation Left   Restrictions/Precautions   Weight Bearing Precautions Per Order Yes   LLE Weight Bearing Per Order WBAT   Other Precautions Chair Alarm; Bed Alarm;Cognitive; Fall Risk;Pain;WBS   General   Chart Reviewed Yes   Response to Previous Treatment Patient with no complaints from previous session  Family/Caregiver Present No   Cognition   Overall Cognitive Status Impaired   Arousal/Participation Cooperative   Attention Attends with cues to redirect   Orientation Level Oriented X4   Memory Decreased short term memory   Following Commands Follows multistep commands with increased time or repetition   Subjective   Subjective Pt sat in chair 3 min before requesting to return to bed  W/ encouragement, pt agreeable to remain in chair for dinner,   Bed Mobility   Supine to Sit 3  Moderate assistance   Additional items Assist x 1;HOB elevated; Increased time required;Verbal cues;LE management   Transfers   Sit to Stand 3  Moderate assistance   Additional items Assist x 1; Increased time required;Verbal cues   Stand to Sit 3  Moderate assistance   Additional items Assist x 1; Increased time required;Verbal cues   Stand pivot 3  Moderate assistance  (w/ RW)   Additional items Assist x 1; Increased time required;Verbal cues   Additional Comments pt assisted to bedside chair, alarm activated, all needs within reach  Rn aware  Ambulation/Elevation   Gait pattern Improper Weight shift; Forward Flexion;Decreased foot clearance;Narrow BIENVENIDO; Excessively slow; Shuffling   Gait Assistance 3  Moderate assist   Additional items Assist x 1;Verbal cues; Tactile cues   Assistive Device Rolling walker   Distance 2 ft   Balance   Static Sitting Fair -   Static Standing Poor +   Dynamic Standing Poor   Ambulatory Poor - Endurance Deficit   Endurance Deficit Yes   Activity Tolerance   Activity Tolerance Patient limited by fatigue;Patient limited by pain   Nurse Made Aware RN confirmed pt appropriate for PT treatment  RN aware pt was left sitting in bedside chair  Exercises   Hip Flexion 20 reps;AROM; Bilateral;Sitting   Knee AROM Long Arc Quad 20 reps;AROM; Bilateral;Sitting   Ankle Pumps 20 reps;AROM; Bilateral;Sitting   Assessment   Prognosis Fair   Problem List Decreased strength;Decreased range of motion;Decreased endurance; Impaired balance;Decreased mobility; Decreased coordination;Decreased cognition; Impaired judgement;Decreased safety awareness;Pain;Orthopedic restrictions   Assessment Pt cooperative t/o PT session  Pt required a heavy modA to transition to EOB and perform sit<>stand, stand-pivot toward bedside chair  After assisting pt to chair she requested to return to bed  W/ encouragement pt agreeable to try sitting in chair for dinner  Pt continues to be at very high risk for fall and would benefit from IP rehab  Will follow  Goals   Patient Goals none reported   STG Expiration Date 02/20/18   Treatment Day 1   Plan   Treatment/Interventions Functional transfer training;LE strengthening/ROM; Elevations; Therapeutic exercise; Endurance training;Cognitive reorientation;Patient/family training;Equipment eval/education; Bed mobility;Gait training;Spoke to nursing   Progress Progressing toward goals   PT Frequency (6x/wk)   Recommendation   Recommendation Post acute IP rehab   Equipment Recommended Liz Chun   PT - OK to Discharge Yes     Ruiz Queen, PT

## 2018-02-07 NOTE — DISCHARGE INSTRUCTIONS
Discharge Instructions - Orthopedics  Rubi Mckeon 80 y o  female MRN: [id number]  Unit/Bed#: [facility] 931-01    Weight Bearing Status:                                           Weight bearing as tolerated left lower extremity    Pain:  Continue analgesics as directed       PT/OT:  Continue PT/OT on outpatient basis as directed    Appt Instructions: If you do not have your appointment, please call the clinic at [phone] to f/u with Dr Gurinder Luu on 3/9 at 10:15 AM    Contact the office sooner if you experience any increased numbness/tingling in the extremities        Miscellaneous:  None

## 2018-02-07 NOTE — DISCHARGE SUMMARY
Discharge Summary - Orthopedics   Barbara Small 80 y o  female MRN: [id number]  [facility]    Attending Physician: Thedora Spatz    Admitting diagnosis: Parkinson disease ([address] ) Orlene Haff  Hypoglycemia [E16 2]  Anemia associated with acute blood loss [D62]  Closed fracture of greater trochanter of left femur ([facility] [place] [address] ) [S72 112A]  Unspecified multiple injuries, initial encounter [T07  XXXA]    Discharge diagnosis: Parkinson disease ([facility] [place] [address] ) [G20]  Hypoglycemia [E16 2]  Anemia associated with acute blood loss [D62]  Closed fracture of greater trochanter of left femur ([address] ) [S72 112A]  Unspecified multiple injuries, initial encounter [T07  XXXA]    Date of admission: 2/5/2018    Date of discharge: 2/8/2018    Procedure: None     HPI: 80 y o  female with a history of Parkinson's who presented to the ER with a left greater trochanter fracture after a fall  After thorough examination and imaging patient was found to have a greater trochanter fracture  Given patient's injury, physical exam, and baseline mobility, a non-operative course of management was chosen  Patient was admitted for pain control and rehabilitation  Hospital course: Patient was in stable condition when transferred to the floor  On the floor she was monitored for signs of bleeding, increased pain, and instability  Pain control was achieved, and she worked with physical therapy to assess rehabilitation needs  She had a three midnight stay for insurance and rehab placement requirements  On discharge date pt was cleared by PT and the medicine team and determined to be safe for discharge  Daily discussion was had with the patient, nursing staff, orthopaedic team, and family members if present  All questions were answered to the patients satisifaction          0  Lab Value Date/Time   HGB 12 6 02/06/2018 0522   HGB 12 6 02/05/2018 1300   HGB 15 0 12/07/2017 0706   HGB 12 9 06/02/2017 1528   HGB 13 1 05/17/2017 1114   HGB 10 4 (L) 03/06/2017 0442   HGB 9 1 (L) 03/02/2017 0707   HGB 8 5 (L) 03/02/2017 0620   HGB 10 0 (L) 03/01/2017 0506   HGB 9 2 (L) 02/26/2017 0758   HGB 9 5 (L) 02/24/2017 0619   HGB 11 6 02/23/2017 0432   HGB 13 3 02/22/2017 2026   HGB 13 7 11/22/2016 0914   HGB 14 4 06/30/2016 0839   HGB 13 8 04/25/2016 0523   HGB 15 1 04/21/2016 0818   HGB 13 8 04/07/2016 0503   HGB 14 3 04/06/2016 1648   HGB 13 7 03/01/2016 1041   HGB 14 4 02/11/2016 0917   HGB 14 7 11/21/2015 0930   HGB 12 7 05/06/2015 1626   HGB 9 7 (L) 04/07/2015 1946   HGB 8 4 (L) 04/01/2015 1007   HGB 8 1 (L) 03/27/2015 1016   HGB 8 6 (L) 03/25/2015 0604   HGB 8 3 (L) 03/24/2015 0605   HGB 8 5 (L) 03/23/2015 2129   HGB 8 1 (L) 03/23/2015 0602   HGB 7 8 (L) 03/22/2015 1938   HGB 7 9 (L) 03/22/2015 0348   HGB 8 9 (L) 03/21/2015 1926   HGB 12 7 03/15/2015 0906       Discharge Instructions:   · WBAT LLE  · DVT Ppx as instructed  · Physical therapy  · Follow-up as scheduled, otherwise call for appt  Discharge Medications: For the complete list of discharge medications, please refer to the patient's medication reconciliation

## 2018-02-07 NOTE — OCCUPATIONAL THERAPY NOTE
Occupational Therapy Treatment Note:       02/07/18 1352   Restrictions/Precautions   LLE Weight Bearing Per Order WBAT   Pain Assessment   Pain Assessment FLACC   Pain Rating: FLACC (Rest) - Face 0   Pain Rating: FLACC (Rest) - Legs 0   Pain Rating: FLACC (Rest) - Activity 1   Pain Rating: FLACC (Rest) - Cry 0   Pain Rating: FLACC (Rest) - Consolability 0   Score: FLACC (Rest) 1   Pain Rating: FLACC (Activity) - Face 1   Pain Rating: FLACC (Activity) - Legs 1   Pain Rating: FLACC (Activity) - Activity 1   Pain Rating: FLACC (Activity) - Cry 1   Pain Rating: FLACC (Activity) - Consolability 0   Score: FLACC (Activity) 4   ADL   UB Dressing Assistance 3  Moderate Assistance   UB Dressing Deficit (to adjust ue's in hosp gown)   LB Dressing Comments total asst to manage footwear and for clothing magement of hospital gown around hips in stance   Functional Standing Tolerance   Time 30 seconds during transfer demosntrating fair minus balance   Bed Mobility   Sit to Supine 3  Moderate assistance   Additional items Assist x 1   Transfers   Sit to Stand 3  Moderate assistance   Additional items Assist x 2   Stand to Sit 3  Moderate assistance   Additional items Assist x 2   Stand pivot 3  Moderate assistance   Additional items Assist x 2   Cognition   Overall Cognitive Status Impaired   Activity Tolerance   Activity Tolerance Patient tolerated treatment well   Assessment   Assessment pt particiapted in pm ot session and was seen focusing on bed mobiltiy, sit to stand from recliner chair, spt with rw chair to bed and standing balance/ activity tolerence  pt was oob most of am  pt with right sided lean when seated in ercliner and when supine btb needning pillows to adjust balance  pt perfomred spt from ercliner to bed wtowards pts right  Plan   Treatment Interventions ADL retraining;Functional transfer training; Endurance training   Goal Expiration Date 02/20/18   OT Frequency 3-5x/wk   Recommendation   OT Discharge Recommendation Short Term Rehab   Barthel Index   Feeding 5   Bathing 0   Grooming Score 0   Dressing Score 0   Bladder Score 5   Bowels Score 10   Toilet Use Score 5   Transfers (Bed/Chair) Score 5   Mobility (Level Surface) Score 0   Stairs Score 0   Barthel Index Score 30   Modified Lachelle Scale   Modified Lachelle Scale 4   April A ANYA Starr

## 2018-02-08 VITALS
BODY MASS INDEX: 20.16 KG/M2 | RESPIRATION RATE: 18 BRPM | HEIGHT: 65 IN | SYSTOLIC BLOOD PRESSURE: 131 MMHG | HEART RATE: 57 BPM | WEIGHT: 121 LBS | OXYGEN SATURATION: 93 % | TEMPERATURE: 98.7 F | DIASTOLIC BLOOD PRESSURE: 62 MMHG

## 2018-02-08 PROCEDURE — 99024 POSTOP FOLLOW-UP VISIT: CPT | Performed by: ORTHOPAEDIC SURGERY

## 2018-02-08 RX ORDER — POLYETHYLENE GLYCOL 3350 17 G/17G
17 POWDER, FOR SOLUTION ORAL DAILY PRN
Status: DISCONTINUED | OUTPATIENT
Start: 2018-02-08 | End: 2018-02-08 | Stop reason: HOSPADM

## 2018-02-08 RX ORDER — BISACODYL 10 MG
10 SUPPOSITORY, RECTAL RECTAL DAILY PRN
Status: DISCONTINUED | OUTPATIENT
Start: 2018-02-08 | End: 2018-02-08 | Stop reason: HOSPADM

## 2018-02-08 RX ADMIN — CARBIDOPA AND LEVODOPA 2 TABLET: 25; 100 TABLET ORAL at 12:17

## 2018-02-08 RX ADMIN — BISACODYL 10 MG: 10 SUPPOSITORY RECTAL at 14:59

## 2018-02-08 RX ADMIN — DOCUSATE SODIUM 100 MG: 100 CAPSULE, LIQUID FILLED ORAL at 17:40

## 2018-02-08 RX ADMIN — TOLTERODINE TARTRATE 2 MG: 2 CAPSULE, EXTENDED RELEASE ORAL at 09:13

## 2018-02-08 RX ADMIN — LACTULOSE 200 G: 10 SOLUTION ORAL; RECTAL at 16:15

## 2018-02-08 RX ADMIN — SENNOSIDES 8.6 MG: 8.6 TABLET, FILM COATED ORAL at 09:13

## 2018-02-08 RX ADMIN — CARBIDOPA AND LEVODOPA 2 TABLET: 25; 100 TABLET ORAL at 17:40

## 2018-02-08 RX ADMIN — ENTACAPONE 200 MG: 200 TABLET, FILM COATED ORAL at 06:29

## 2018-02-08 RX ADMIN — ENTACAPONE 200 MG: 200 TABLET, FILM COATED ORAL at 12:18

## 2018-02-08 RX ADMIN — CARBIDOPA AND LEVODOPA 2 TABLET: 25; 100 TABLET ORAL at 06:32

## 2018-02-08 RX ADMIN — ENTACAPONE 200 MG: 200 TABLET, FILM COATED ORAL at 17:40

## 2018-02-08 RX ADMIN — DOCUSATE SODIUM 100 MG: 100 CAPSULE, LIQUID FILLED ORAL at 09:13

## 2018-02-08 RX ADMIN — OXYCODONE HYDROCHLORIDE 10 MG: 10 TABLET ORAL at 05:25

## 2018-02-08 RX ADMIN — ENOXAPARIN SODIUM 40 MG: 40 INJECTION SUBCUTANEOUS at 09:13

## 2018-02-08 NOTE — PROGRESS NOTES
Orthopedics   Zeenatsussy Long 80 y o  female MRN: [id number]  [facility]    Subjective:   80 y  o female with left hip greater trochanteric fracture  No overnight events  Pain controlled         Labs:    0  Lab Value Date/Time   HCT 37 9 02/06/2018 0522   HCT 37 6 02/05/2018 1300   HCT 45 6 12/07/2017 0706   HCT 44 2 11/21/2015 0930   HCT 41 2 05/06/2015 1626   HCT 31 3 (L) 04/07/2015 1946   HGB 12 6 02/06/2018 0522   HGB 12 6 02/05/2018 1300   HGB 15 0 12/07/2017 0706   HGB 14 7 11/21/2015 0930   HGB 12 7 05/06/2015 1626   HGB 9 7 (L) 04/07/2015 1946   INR 1 05 02/23/2017 0432   WBC 5 21 02/06/2018 0522   WBC 6 15 02/05/2018 1300   WBC 3 25 (L) 12/07/2017 0706   WBC 5 06 11/21/2015 0930   WBC 4 00 (L) 05/06/2015 1626   WBC 7 16 04/07/2015 1946   ESR 14 06/30/2016 0839       Meds:    Current Facility-Administered Medications:     acetaminophen (TYLENOL) tablet 650 mg, 650 mg, Oral, Q4H PRN, Rolo Hudson MD    entacapone (COMTAN) tablet 200 mg, 200 mg, Oral, 5x Daily, 200 mg at 02/07/18 2155 **AND** carbidopa-levodopa (SINEMET)  mg per tablet 2 tablet, 2 tablet, Oral, 5x Daily, Rolo Hudson MD, 2 tablet at 02/07/18 2154    docusate sodium (COLACE) capsule 100 mg, 100 mg, Oral, BID, Rolo Hudson MD, 100 mg at 02/07/18 1814    enoxaparin (LOVENOX) subcutaneous injection 40 mg, 40 mg, Subcutaneous, Daily, Rolo Hudson MD, 40 mg at 02/07/18 0835    lactated ringers infusion, 75 mL/hr, Intravenous, Continuous, Rolo Hudson MD, Stopped at 02/06/18 1927    mirtazapine (REMERON) tablet 15 mg, 15 mg, Oral, HS, Rolo Hudson MD, 15 mg at 02/07/18 3765    morphine injection 2 mg, 2 mg, Intravenous, Q1H PRN, Rolo Hudson MD    ondansetron (ZOFRAN) injection 4 mg, 4 mg, Intravenous, Q6H PRN, Rolo Hudson MD    oxyCODONE (ROXICODONE) immediate release tablet 10 mg, 10 mg, Oral, Q4H PRN, Rolo Hudson MD, 10 mg at 02/08/18 0525    oxyCODONE (ROXICODONE) IR tablet 5 mg, 5 mg, Oral, Q4H PRN, Rolo Hudson MD, 5 mg at 02/07/18 0834    senna (SENOKOT) tablet 8 6 mg, 1 tablet, Oral, Daily, Rolo Hudson MD, 8 6 mg at 02/07/18 4733    tolterodine (DETROL LA) 24 hr capsule 2 mg, 2 mg, Oral, Daily, Narda Alvarez PA-C, 2 mg at 02/07/18 1925    Blood Culture:   No results found for: BLOODCX    Wound Culture:   No results found for: WOUNDCULT    Ins and Outs:  I/O last 24 hours: In: 987 5 [P O :580; I V :407 5]  Out: 1010 [Urine:1010]          Physical Exam:   /66 (BP Location: Right arm)   Pulse (!) 54   Temp 98 2 °F (36 8 °C) (Oral)   Resp 19   Ht 5' 5" (1 651 m)   Wt 54 9 kg (121 lb)   LMP  (LMP Unknown)   SpO2 98%   BMI 20 14 kg/m²   Musculoskeletal: left lower extremity  · Skin intact   · Can flex at hip with minimal piain  · Does not comply with motor/sensory exam this morning  · 2+ DP pulse    _*_*_*_*_*_*_*_*_*_*_*_*_*_*_*_*_*_*_*_*_*_*_*_*_*_*_*_*_*_*_*_*_*_*_*_*_*_*_*_*_*    Assessment:  80 y  o female with left hip greater trochanteric fracture, not indicated for operative intervention    Plan:   · WBAT left lower extremity  · Non op mgmt of fracture  · PT  · Pain control  · DVT ppx  · Dispo: pending placement      Halley Gil MD   02/08/18

## 2018-02-08 NOTE — SOCIAL WORK
Cm reviewed patient during care coordination rounds  Patient is medically stable for d/c today  Cm informed that accepting facility, [facility] would accept patient today if patient had a documented BM  Per PM RN, patient was disimpacted and Cm communicated such to accepting facility  Cm intern arranged BLS transportation with [facility] for 7:30pm  Cm informed patient' son and accepting facility  All parties in agreement with discharge plan

## 2018-02-28 ENCOUNTER — TELEPHONE (OUTPATIENT)
Dept: OBGYN CLINIC | Facility: HOSPITAL | Age: 83
End: 2018-02-28

## 2018-02-28 NOTE — TELEPHONE ENCOUNTER
Recommended total dose of Lovenox after hip fracture is 30 days  Please continue for a total of 30 days

## 2018-02-28 NOTE — TELEPHONE ENCOUNTER
Scout Davis   [phone]  Patient sees Dr Stern [facility]    Patient is leaving [place] going home  Patient's family wants to know if patient needs to continue the lovenox  Gianluca Joseph is stating that the patient mobil, she's ambulating 150'  She will have a caregiver that will make sure she is moving 150' 3x's/wk  Please advise

## 2018-03-09 ENCOUNTER — OFFICE VISIT (OUTPATIENT)
Dept: OBGYN CLINIC | Facility: HOSPITAL | Age: 83
End: 2018-03-09
Payer: MEDICARE

## 2018-03-09 ENCOUNTER — HOSPITAL ENCOUNTER (OUTPATIENT)
Dept: RADIOLOGY | Facility: HOSPITAL | Age: 83
Discharge: HOME | End: 2018-03-09
Attending: ORTHOPAEDIC SURGERY
Payer: MEDICARE

## 2018-03-09 VITALS — HEART RATE: 54 BPM | SYSTOLIC BLOOD PRESSURE: 132 MMHG | DIASTOLIC BLOOD PRESSURE: 62 MMHG

## 2018-03-09 DIAGNOSIS — Z48.89 AFTERCARE FOLLOWING SURGERY: Primary | ICD-10-CM

## 2018-03-09 PROCEDURE — 73502 X-RAY EXAM HIP UNI 2-3 VIEWS: CPT

## 2018-03-09 PROCEDURE — 99024 POSTOP FOLLOW-UP VISIT: CPT | Performed by: ORTHOPAEDIC SURGERY

## 2018-03-09 NOTE — PROGRESS NOTES
ASSESSMENT/PLAN:    Diagnoses and all orders for this visit:    Aftercare following surgery  -     XR hip/pelv 2-3 vws left if performed    Other orders  -     Acetaminophen 325 MG CAPS; Take by mouth  -     carbidopa-levodopa (SINEMET)  mg per tablet; Assessment:   Fracture Left femur greater trochanter    Plan:   Conservative treatment with ambulation as tolerated  Pt understands that operative treatment for this fracture is not recommended or necessary  Follow Up:  2  month(s)    To Do Next Visit:    and X-rays of the  left  hip    General Discussions:     Fracture - Nonoperative Care: The physiology of a fractured bone was discussed with the patient today  With nondisplaced or minimally displaced fractures, conservative treatment often results in a functional recovery  Typically, these fractures are immobilized in either a cast or splint depending on the pattern  Radiographs are typically taken at intervals throughout the fracture healing to ensure that muscular forces do not cause loss of reduction or alignment  If the fracture loses its alignment, surgical intervention may be required to stabilize it  Medical conditions such as diabetes, osteoporosis, vitamin D deficiency, and a history of or exposure to smoking may delay or prevent fracture healing  Operative Discussions:      _____________________________________________________  CHIEF COMPLAINT:  Chief Complaint   Patient presents with    Left Hip - Fracture         SUBJECTIVE:  Tyler Miguel is a 80y o  year old female who presents with Fracture to the left greater trochanter  This started  5 week(s) ago as Due to a personal injury  Today she still has some pain in the left lateral hip region, worse with movement, better with rest   Radiation: None  Previous Treatments: None   Associated symptoms: No numbness or tingling      PAST MEDICAL HISTORY:  Past Medical History:   Diagnosis Date    Anxiety     Dementia     Depression     Gastric ulcer     H/O:  section     HLD (hyperlipidemia) 2016    Hypercholesteremia     Parkinson's disease ([facility] [address] )     Raynaud's disease        PAST SURGICAL HISTORY:  Past Surgical History:   Procedure Laterality Date     SECTION      CHOLECYSTECTOMY      ESOPHAGOGASTRODUODENOSCOPY      KNEE ARTHROSCOPY Left     MT COLONOSCOPY FLX DX W/COLLJ SPEC WHEN PFRMD N/A 3/18/2016    Procedure: COLONOSCOPY;  Surgeon: Lenka Rankin MD;  [facility]; Service: Gastroenterology    MT ESOPHAGOGASTRODUODENOSCOPY TRANSORAL DIAGNOSTIC N/A 2016    Procedure: ESOPHAGOGASTRODUODENOSCOPY (EGD); Surgeon: Lenka Rankin MD;  [facility]; Service: Gastroenterology    MT OPEN RX FEMUR FX+INTRAMED CHARLEY Right 2017    Procedure: INSERTION NAIL IM FEMUR ANTEGRADE (TROCHANTERIC); Surgeon: Manny Navarro MD;  [facility];  Service: Orthopedics       FAMILY HISTORY:  No family history on file      SOCIAL HISTORY:  Social History   Substance Use Topics    Smoking status: Never Smoker    Smokeless tobacco: Never Used    Alcohol use 0 6 oz/week     1 Glasses of wine per week      Comment: social       MEDICATIONS:    Current Outpatient Prescriptions:     Acetaminophen 325 MG CAPS, Take by mouth, Disp: , Rfl:     Calcium Carbonate-Vitamin D (OYST-KANDI D PO), Take 1 tablet by mouth daily 500mg OsCal/Calcium , Disp: , Rfl:     carbidopa-levodopa (SINEMET)  mg per tablet, , Disp: , Rfl:     carbidopa-levodopa-entacapone (STALEVO) -200 MG per tablet, Take 1 tablet by mouth 5 (five) times a day Indications: Parkinson's Disease with Unknown Cause , Disp: , Rfl:     Cholecalciferol (VITAMIN D PO), Take 1 tablet by mouth daily  , Disp: , Rfl:     CHOLINE FENOFIBRATE PO, Take 600 mg by mouth daily, Disp: , Rfl:     Cyanocobalamin (VITAMIN B-12 PO), Take by mouth , Disp: , Rfl:     dextrose 50 %, Infuse 25 mL into a venous catheter as needed for low blood sugar (for blood glucose <60) for up to 30 days, Disp: 50 mL, Rfl: 0    enoxaparin (LOVENOX) 40 mg/0 4 mL, Inject 0 4 mL (40 mg total) under the skin daily, Disp: 30 Syringe, Rfl: 0    glucosamine 500 MG CAPS capsule, Take 1,000 mg by mouth daily  , Disp: , Rfl:     lisinopril (ZESTRIL) 5 mg tablet, Take 5 mg by mouth daily, Disp: , Rfl:     mirtazapine (REMERON) 15 mg tablet, Take 1 tablet by mouth daily at bedtime for 30 days, Disp: 30 tablet, Rfl: 0    Multiple Vitamins-Minerals (CENTRUM SILVER ADULT 50+ PO), Take by mouth daily  , Disp: , Rfl:     solifenacin (VESICARE) 5 mg tablet, Take 5 mg by mouth daily after dinner, Disp: , Rfl:     ALLERGIES:  Allergies   Allergen Reactions    Penicillins      Unknown reaction  Does not remember when last had it       REVIEW OF SYSTEMS:  Pertinent items are noted in HPI  A comprehensive review of systems was negative  LABS:  HgA1c: No results found for: HGBA1C  BMP:   Lab Results   Component Value Date    GLUCOSE 81 02/06/2018    CALCIUM 8 8 02/06/2018     02/06/2018    K 4 3 02/06/2018    CO2 30 02/06/2018     02/06/2018    BUN 22 02/06/2018    CREATININE 0 77 02/06/2018         _____________________________________________________  PHYSICAL EXAMINATION:  General: well developed and well nourished, alert, oriented times 3 and appears comfortable Pt in wheelchair  Psychiatric: Normal  HEENT: Trachea Midline, No torticollis  Cardiovascular: No discernable arrhythmia  Pulmonary: No wheezing or stridor  Neuro: strength and sensory intact L1-S1  Vascular: distal pulses intact, LLE    MUSCULOSKELETAL EXAMINATION:  LEFT SIDE:  Lower extremity  ROM with hip flexion and extension slightly limited by pain  External rotation at hip limited by pain  Knee extension to about 5 degrees  Knee flexion to 100 degrees   Tenderness to palpation over region of greater trochanter    _____________________________________________________  STUDIES REVIEWED:  PABLO charles personally reviewed pertinent films in PACS and my interpretation is XR left hip shows healing fracture of greater trochanter of left hip        PROCEDURES PERFORMED:  Procedures  No Procedures performed today

## 2018-03-23 ENCOUNTER — OFFICE VISIT (OUTPATIENT)
Dept: NEUROLOGY | Facility: CLINIC | Age: 83
End: 2018-03-23
Payer: MEDICARE

## 2018-03-23 VITALS
BODY MASS INDEX: 20.43 KG/M2 | WEIGHT: 111 LBS | SYSTOLIC BLOOD PRESSURE: 162 MMHG | DIASTOLIC BLOOD PRESSURE: 76 MMHG | HEIGHT: 62 IN

## 2018-03-23 DIAGNOSIS — G20 PARKINSON DISEASE (HCC): Primary | ICD-10-CM

## 2018-03-23 PROCEDURE — 99214 OFFICE O/P EST MOD 30 MIN: CPT | Performed by: PHYSICIAN ASSISTANT

## 2018-03-23 RX ORDER — CARBIDOPA, LEVODOPA AND ENTACAPONE 50; 200; 200 MG/1; MG/1; MG/1
TABLET, FILM COATED ORAL
Qty: 450 TABLET | Refills: 3 | Status: SHIPPED | OUTPATIENT
Start: 2018-03-23 | End: 2019-03-01 | Stop reason: SDUPTHER

## 2018-03-23 NOTE — PROGRESS NOTES
Patient ID: Rubi Mckeon is a 80 y o  female  Assessment/Plan:    Parkinson disease [facility])  Patient with some increased balance issues and falls recently  She is much slower and feels more off balance now  She did have a fall in 1/2018 which resulted in a fractured left femur  She is recovering from this  She now has home PT with the hopes to return to outpatient therapy in the near future  Discussed the option of increasing her medication slightly to see if there is any added benefit in terms of her gait  She will try taking Stalevo 200 5 times a day (every 4 hours) along with Sinemet 25/100mg 1/2 tab in the AM and Pm  If there are any side effects she will stop and remain only on the Stalevo  Also discussed having the  allow the patient to wake on her own in the AM instead of waking her up to take the first dose of Stalevo  Subjective:    Ms Rubi Mckeon is an 80year old woman with Parkinson's disease and dementia who presents for follow up  She is accompanied by her son  To review, she was diagnosed with Parkinson's disease in 2010  She was started on Sinemet and feels it helped but she is not sure what symptoms were helped  When she initially presented she was taking Sinemet 25/100 1 5 tabs at 7am, 11, 3pm, 7pm  She was never on any other medication for PD  She had been on Paxil for over 5 years and Wellbutrin for over 2 years  Prior swallow evaluation revealed evidence of mild pharyngeal dysphagia and it was recommended that she be on a moist soft food diet with pills given with applesauce  At her last visit she was overall doing well with no further episodes of hypotension  She was referred for the BIG therapy  No medication changes were made and she remains on Stalevo 200 5 times a day (6am, 10am, 2pm, 6pm, 10pm)  She was admitted to the hospital in 2/2018 after a witnessed fall in the laundry room    She was found to have a closed fracture of greater trochanter of left femur  Orthopedics was consulted and it was felt to treat nonsurgical   She was transferred to [facility] [place] for rehab following hospitalization  She has an aid come to the house Monday, Weds and Friday  The aids help with showering  Her  helps with dressing  She is eating well  She feels she still has a good appetite  No issues with swallowing  She is still getting home PT right now with the goal to get to the BIG program   She is hopeful to get to [facility] in the near future  No hallucinations  She was having urinary issues in the past however she feels this is better  She is having some falls at times  She feels that she gets very weak and will not be able to hold herself up  She denies any clear on or off with the medication  Total time spent today 35 minutes  Greater than 50% of total time was spent with the patient and / or family counseling and / or coordination of care         Objective:    Blood pressure 162/76, height 5' 2" (1 575 m), weight 50 3 kg (111 lb)  Physical Exam   Eyes: EOM are normal  Pupils are equal, round, and reactive to light  Neurological Exam    Mental Status  The patient is alert  She has normal attention span and concentration  [facility] 4/14/17 - 8/30      Cranial Nerves    CN II: The patient's visual acuity and visual fields are normal   CN III, IV, VI: The patient's pupils are equally round and reactive to light and ocular movements are normal   CN V: The patient has normal facial sensation  CN VII:  The patient has symmetric facial movement  CN VIII:  The patient's hearing is normal   CN IX, X: The patient has symmetric palate movement and normal gag reflex  CN XI: The patient's shoulder shrug strength is normal   CN XII: The patient's tongue is midline without atrophy or fasciculations  Motor    moderate hypomimia 2, moderate hypophonia 2  Very pleasant and talkative    Mild to moderate bradykinesia on FT 2,2 with decrement more on the left, HG 1,1, JOSE 1,1, heel taps 2,2  No action tremor  No dystonia or dyskinesias  Generalized slowness with all movements  Sensory  The patient's sensation is to light touch  Reflexes  She has glabellar tap release signs present  She has no palmomental release signs  Gait and Coordination    Arose using hands and required minimal assistance 2  Walks with shortened stride more on the left 2 with the walker  No freezing or festination  Spontaneous retropulsion  Very slowed movements  En bloc turning  ROS:    Review of Systems   Constitutional: Positive for appetite change and fatigue  HENT: Negative  Eyes: Negative  Respiratory: Negative  Cardiovascular: Negative  Gastrointestinal: Negative  Endocrine: Negative  Genitourinary: Negative  Musculoskeletal: Positive for gait problem  Skin: Negative  Allergic/Immunologic: Negative  Neurological: Positive for dizziness and light-headedness  When she is getting off the bed she feels dizzy   Hematological: Negative  Psychiatric/Behavioral: Positive for confusion  The patient is nervous/anxious

## 2018-03-23 NOTE — ASSESSMENT & PLAN NOTE
Patient with some increased balance issues and falls recently  She is much slower and feels more off balance now  She did have a fall in 1/2018 which resulted in a fractured left femur  She is recovering from this  She now has home PT with the hopes to return to outpatient therapy in the near future  Discussed the option of increasing her medication slightly to see if there is any added benefit in terms of her gait  She will try taking Stalevo 200 5 times a day (every 4 hours) along with Sinemet 25/100mg 1/2 tab in the AM and Pm  If there are any side effects she will stop and remain only on the Stalevo  Also discussed having the  allow the patient to wake on her own in the AM instead of waking her up to take the first dose of Stalevo

## 2018-05-23 ENCOUNTER — TELEPHONE (OUTPATIENT)
Dept: NEUROLOGY | Facility: CLINIC | Age: 83
End: 2018-05-23

## 2018-05-23 NOTE — TELEPHONE ENCOUNTER
[place] going [place] on [place] 5, can pharmacy dispense 6 month supply for Brendavo before she leaves?     [address]  [facility]  Ref number: [id number]

## 2018-05-25 ENCOUNTER — OFFICE VISIT (OUTPATIENT)
Dept: NEUROLOGY | Facility: CLINIC | Age: 83
End: 2018-05-25
Payer: MEDICARE

## 2018-05-25 DIAGNOSIS — F02.80 DEMENTIA DUE TO PARKINSON'S DISEASE WITHOUT BEHAVIORAL DISTURBANCE (HCC): ICD-10-CM

## 2018-05-25 DIAGNOSIS — G20 PARKINSON DISEASE (HCC): Primary | ICD-10-CM

## 2018-05-25 DIAGNOSIS — G20 DEMENTIA DUE TO PARKINSON'S DISEASE WITHOUT BEHAVIORAL DISTURBANCE (HCC): ICD-10-CM

## 2018-05-25 PROCEDURE — 99214 OFFICE O/P EST MOD 30 MIN: CPT | Performed by: PHYSICIAN ASSISTANT

## 2018-05-25 NOTE — ASSESSMENT & PLAN NOTE
Patient with dementia  She has some increased episodes of confusion at times per the aid  She failed a trial of rivastigmine in the past and does not wish to start any other medication at this time  She was encouraged to keep her mind stimulated

## 2018-05-25 NOTE — PROGRESS NOTES
Patient ID: Deshawn Montiel is a 80 y o  female  Assessment/Plan:    [facility]  Patient overall stable however she continues to have these episodes in the AM when she will be unresponsive  She has had a workup for this in the past including ECHO and EEG  No clear underlying cause was noted  It was questioned if this was related to being off in the AM however it appears that this can happen at times if she falls back asleep after taking her 6am dose as well  It is always related to being woken from sleep and once again discussed letting her wake on her own  The aid has noticed that during the episodes her pulse is low and BP high  She will discuss this with the PCP as well to make sure they would not like to do any further workup  May consider a sleep study in the future however the patient is leaving for [facility] for the next 6 months so this would not be done at this time  For now she will remain on her current dose of Stalevo  She will have some therapy in [facility]  She was also told to try Miralax for constipation and increase her fluid and fiber intake  Dementia due to Parkinson's disease without behavioral disturbance [facility])  Patient with dementia  She has some increased episodes of confusion at times per the aid  She failed a trial of rivastigmine in the past and does not wish to start any other medication at this time  She was encouraged to keep her mind stimulated  Subjective:    Ms Deshawn Montiel is an 80year old woman with Parkinson's disease and dementia who presents for follow up  She is accompanied by her son  To review, she was diagnosed with Parkinson's disease in 2010  She was started on Sinemet and feels it helped but she is not sure what symptoms were helped  When she initially presented she was taking Sinemet 25/100 1 5 tabs at 7am, 11, 3pm, 7pm  She was never on any other medication for PD   She had been on Paxil for over 5 years and Wellbutrin for over 2 years  Prior swallow evaluation revealed evidence of mild pharyngeal dysphagia and it was recommended that she be on a moist soft food diet with pills given with applesauce  She had a fall in 2/2018 resulting in a closed fracture of greater trochanter of left femur  She has had some episodes of unresponsivness in the AM  She had an ECHO and EEG which were normal   EEG revealed intermittent right temporal delta activity with diffuse theta activity and slow posterior rhythm which was nonspecific  Unclear if these episodes are related to BP changes, wearing off symptom, atypical seizure or just due to being woken from sleep  At her last visit 2 months ago she was having more issues with balance and falls  Her medication was increased slightly and she is currently taking Stalevo 200 5 times a day (every 4 hours) (6am, 10am, 2pm, 6pm, 10pm) along with Sinemet 25/100mg 1/2 tab in the AM and PM   She presents today for follow up before leaving for [facility] for the next 6 months  She is overall stable  She has a caregiver with her 18-20 hours a week  She continues to have these episodes when she will be unresponsive in the AM after being woken up  She does not have any episodes like this in the middle of the day  She had a workup for this in the past which was normal   Per the aid she does notice that her pulse is lower and BP higher during he episodes  They can last anywhere from minutes to an hour  She will get them even after she falls back asleep following her 6am dose of medication  They are always related to her being woken up from sleep  She is otherwise stable  She is doing exercises with her aid  She walks with the walker  She has assistance with dressing and showering  She continues to have some confusion at times  Total time spent today 45 minutes   Greater than 50% of total time was spent with the patient and / or family counseling and / or coordination of care Objective: There were no vitals taken for this visit  Physical Exam   Constitutional: She appears well-developed and well-nourished  Eyes: EOM are normal  Pupils are equal, round, and reactive to light  Neurological Exam    Mental Status  The patient is alert  She has normal attention span and concentration  [facility] 4/14/17 - 8/30      Cranial Nerves    CN II: The patient's visual acuity and visual fields are normal   CN III, IV, VI: The patient's pupils are equally round and reactive to light and ocular movements are normal   CN V: The patient has normal facial sensation  CN VII:  The patient has symmetric facial movement  CN VIII:  The patient's hearing is normal   CN IX, X: The patient has symmetric palate movement and normal gag reflex  CN XI: The patient's shoulder shrug strength is normal   CN XII: The patient's tongue is midline without atrophy or fasciculations  Motor    Moderate hypomimia 2, moderate hypophonia 2  Very pleasant and talkative  Mild to moderate bradykinesia on FT 2,2 with decrement more on the left, HG 1,2, JOSE 1,1, heel taps 2,2  No action tremor  No resting tremor  No dystonia  Mild dyskinesia of the legs when distracted  Generalized slowness with all movements  Sensory  The patient's sensation is to light touch  Reflexes  She has glabellar tap release signs present  She has no palmomental release signs  Gait and Coordination    Arose using hands and required minimal assistance 2  Walks with shortened stride more on the left 2 with the walker  No freezing or festination  Spontaneous retropulsion  Very slowed movements  En bloc turning  ROS:    Review of Systems   Constitutional: Positive for appetite change and fatigue  HENT: Positive for trouble swallowing and voice change  Eyes: Positive for pain and visual disturbance  Respiratory: Negative  Cardiovascular: Negative  Gastrointestinal: Positive for constipation     Endocrine: Negative  Genitourinary: Negative  Musculoskeletal: Positive for back pain and gait problem  Skin: Negative  Allergic/Immunologic: Negative  Neurological: Positive for dizziness, weakness and light-headedness  Hematological: Negative  Psychiatric/Behavioral: Positive for agitation, confusion, decreased concentration and sleep disturbance  The patient is nervous/anxious           Memory Difficulties at times

## 2018-05-25 NOTE — ASSESSMENT & PLAN NOTE
Patient overall stable however she continues to have these episodes in the AM when she will be unresponsive  She has had a workup for this in the past including ECHO and EEG  No clear underlying cause was noted  It was questioned if this was related to being off in the AM however it appears that this can happen at times if she falls back asleep after taking her 6am dose as well  It is always related to being woken from sleep and once again discussed letting her wake on her own  The aid has noticed that during the episodes her pulse is low and BP high  She will discuss this with the PCP as well to make sure they would not like to do any further workup  May consider a sleep study in the future however the patient is leaving for [facility] for the next 6 months so this would not be done at this time  For now she will remain on her current dose of Stalevo  She will have some therapy in [facility]  She was also told to try Miralax for constipation and increase her fluid and fiber intake

## 2018-06-01 ENCOUNTER — TELEPHONE (OUTPATIENT)
Dept: NEUROLOGY | Facility: CLINIC | Age: 83
End: 2018-06-01

## 2018-06-01 NOTE — TELEPHONE ENCOUNTER
Called and [facility] for son to make him aware of Eliezer's response  Please follow up if patients son doesn't not call back today       Thank you

## 2018-06-01 NOTE — TELEPHONE ENCOUNTER
pt's son barrett called  states you instructed pt to start taking miralax  he is asking  how frequently you would like her to take      [address] [place] [address]

## 2018-06-04 NOTE — TELEPHONE ENCOUNTER
Called and eft a message on [employer]'s [employer] machine for a call back  Second attempt  The patient is a 30y Female complaining of

## 2018-08-16 ENCOUNTER — TELEPHONE (OUTPATIENT)
Dept: NEUROLOGY | Facility: CLINIC | Age: 83
End: 2018-08-16

## 2018-08-16 NOTE — TELEPHONE ENCOUNTER
Son calls re questions w/duopa pump  Is this an option? How does she qualify? What are Eliezer's thoughts?     States pt sleeps a lot & frequently misses doses of meds, he was told not to wake her because sleep is more important  Its harder for pt to move because she's missing doses    Please advise

## 2018-08-16 NOTE — TELEPHONE ENCOUNTER
This may be an option for the patient  In order to qualify the patient needs to have a response to the medication (improvement of symptoms after taking the oral medication) and has wearing off at least 3 hours of the day  They do have an appointment coming up in November with Dr Ekta White and they can discuss this further with her  In the meantime if they would like to learn more about the pump itself and the procedure we could have them meet with the [facility] rep  If they are interested let me know and I can send a message to the rep  Thanks!

## 2018-08-21 NOTE — TELEPHONE ENCOUNTER
Called and advised pt's son of all of the below  He stated, "what she mean weaning off at least 3 hours of the day?"  He states at times, pt sleeps a lot and misses a doses of her meds  He states pt's dosage scheduled starts from 0600, 10 am, 2 pm , 6 pm and 10 pm-q 4 hours 5x/day  He is interested to learn more about the pump and procedure and would neetu to meet w/ the Duopa rep       thanks

## 2018-08-23 DIAGNOSIS — G20 PARKINSON DISEASE (HCC): Primary | ICD-10-CM

## 2018-08-23 NOTE — TELEPHONE ENCOUNTER
pt's son made aware  he would like to meet with bruna Garcia could you please help set up meeting with rep  Dr Scarlett Barba is also asking for PT script for Big program   states that doing both big and loud at the same time would be to much for her

## 2018-08-23 NOTE — TELEPHONE ENCOUNTER
This means she needs to have improvement on the Sinemet which does not last all day  So in one day during the day she must have periods she has off time (time medications are not working/ providing benefit) which total up to 3 hours daily  We can set them up to meet with the Duopa rep to further discuss and we can reevaluate whether she would be a good candidate on follow up  After speaking to the patient , forward to Janet and she can help patient schedule a time with the Duopa rep

## 2018-08-24 NOTE — TELEPHONE ENCOUNTER
Spoke to Gema Lira and she stated she would like patient to come to the appt due to it being life changing to patient  I called patients son to make him aware and he stated he does not wan to bring his mother to the appt as he is coming in to get information on the duopa pump  I called Gema Lira back and she stated this is fine   Appt is scheduled for 9/10/18 @ 11am

## 2018-08-24 NOTE — TELEPHONE ENCOUNTER
Called and spoke with Suri Mendoza educator and she stated she will be able to meet with patients son the week of 9/10/18  I called patients son Roberto Post and made the appt for 9/10/18 @ 11am in our [place] location  Patient son asked if patient had to attend appt with Monae Aguilar  I called Monae Aguilar to verify and I am waiting on a call back  Patient son also asked if I could just give grey his number I will verify with management if this is okay and if so I will provide Monae Aguilar with barrett's number      Thank you

## 2018-10-22 ENCOUNTER — TELEPHONE (OUTPATIENT)
Dept: NEUROLOGY | Facility: CLINIC | Age: 83
End: 2018-10-22

## 2018-10-22 NOTE — TELEPHONE ENCOUNTER
Pt's son called in, feeling that pt needs changes in the timing of her medication, reports that she was been getting her medication every 4 hours from 6am-10am  Asking if he can change this to every 3 hours because she sometimes does not wake up by 6am, so she will miss that dose, or she will go to sleep earlier and miss the bedtime dose, and that makes the morning difficult for her  He was told to not wake her to give the medication, and if she sleeps past a dose then to just give the next one when it is normally scheduled  Regarding the Duopa pump he feels that she may pull at the pump/tubing  Also stating the Stalevo pills are too big for her, he is thinking of cutting them in half, questioning if there is a smaller formulation/pill that she can take multiple of to equal the dose she is currently taking?   [phone]

## 2018-11-02 NOTE — TELEPHONE ENCOUNTER
There are no smaller stalevo pills she can take that can be equivalent  We can consider switching to a different formulation of levodopa, Rytary which can be opened and beads taken  It is not an exact conversion and may take sometime to covert

## 2018-11-02 NOTE — TELEPHONE ENCOUNTER
No should take first dose when awaken what every time that is and subsequent doses 4 hours later   If she ends up with less doses in the day because her day is shorter , then that is fine

## 2018-11-02 NOTE — TELEPHONE ENCOUNTER
Pt's son barrett made aware    They will have pt take meds when she wakes up and every 4 hours after

## 2018-11-02 NOTE — TELEPHONE ENCOUNTER
To clarify, can patient change to taking meds every 3 hours, please see Aubrie's previous note regarding this

## 2018-11-26 ENCOUNTER — OFFICE VISIT (OUTPATIENT)
Dept: NEUROLOGY | Facility: CLINIC | Age: 83
End: 2018-11-26
Payer: MEDICARE

## 2018-11-26 VITALS
SYSTOLIC BLOOD PRESSURE: 102 MMHG | HEIGHT: 62 IN | DIASTOLIC BLOOD PRESSURE: 60 MMHG | BODY MASS INDEX: 18.58 KG/M2 | WEIGHT: 101 LBS

## 2018-11-26 DIAGNOSIS — R13.19 OTHER DYSPHAGIA: ICD-10-CM

## 2018-11-26 DIAGNOSIS — F02.80 DEMENTIA DUE TO PARKINSON'S DISEASE WITHOUT BEHAVIORAL DISTURBANCE (HCC): ICD-10-CM

## 2018-11-26 DIAGNOSIS — G20 PARKINSON DISEASE (HCC): Primary | ICD-10-CM

## 2018-11-26 DIAGNOSIS — G20 DEMENTIA DUE TO PARKINSON'S DISEASE WITHOUT BEHAVIORAL DISTURBANCE (HCC): ICD-10-CM

## 2018-11-26 PROBLEM — R13.10 DYSPHAGIA: Status: ACTIVE | Noted: 2018-11-26

## 2018-11-26 PROCEDURE — 99214 OFFICE O/P EST MOD 30 MIN: CPT | Performed by: PSYCHIATRY & NEUROLOGY

## 2018-11-26 NOTE — PATIENT INSTRUCTIONS
Will continue on current medications  If still having a hard time swallowing pills can consider switching to only Sinemet  or a dissolvable form of Sinemet  We will then need to see if medications continue to last 4 hours or if we need to increased the frequency of dosing  WE can also consider switching to Rytary (immediate and long acting beads of Sinemet), downfall may be cost of medication  An alternative would be Duopa pump which is levodopa infused via J-PEG tube into the small intestine      Will refer for PT

## 2018-11-26 NOTE — PROGRESS NOTES
Patient ID: Lucia Naranjo is a 80 y o  female  Assessment/Plan:    Parkinson disease ([facility] 75 )  Advanced Pd, now wheelchair bound with dementia  Will continue on current medications  If still having a hard time swallowing pills can consider switching to only Sinemet  or a dissolvable form of Sinemet  We will then need to see if medications continue to last 4 hours or if we need to increased the frequency of dosing  WE can also consider switching to Rytary (immediate and long acting beads of Sinemet), downfall may be cost of medication  An alternative would be Duopa pump which is levodopa infused via J-PEG tube into the small intestine  Will refer for PT  Diagnoses and all orders for this visit:    Parkinson disease [facility])  -     Ambulatory referral to Physical Therapy; Future    Dementia due to Parkinson's disease without behavioral disturbance ([facility] )    Other dysphagia    Other orders  -     Melatonin-Pyridoxine (MELATIN PO); Take 1 tablet by mouth daily at bedtime  -     Discontinue: Doxylamine Succinate, Sleep, (SLEEP AID PO); Take 1 tablet by mouth daily at bedtime  -     Doxylamine Succinate, Sleep, (UNISOM PO); Take 1 capsule by mouth daily at bedtime           Subjective:    Ms Lucia Naranjo is an 80year old woman with Parkinson's disease and dementia who presents for follow up  She is accompanied by her son  To review, she was diagnosed with Parkinson's disease in 2010  She was started on Sinemet and feels it helped but she is not sure what symptoms were helped  When she initially presented she was taking Sinemet 25/100 1 5 tabs at 7am, 11, 3pm, 7pm  She was never on any other medication for PD  She had been on Paxil for over 5 years and Wellbutrin for over 2 years  Prior swallow evaluation revealed evidence of mild pharyngeal dysphagia and it was recommended that she be on a moist soft food diet with pills given with applesauce   She had a fall in 2/2018 resulting in a closed fracture of greater trochanter of left femur  She has had some episodes of unresponsivness in the AM  She had an ECHO and EEG which were normal   EEG revealed intermittent right temporal delta activity with diffuse theta activity and slow posterior rhythm which was nonspecific  Unclear if these episodes are related to BP changes, wearing off symptom, atypical seizure or just due to being woken from sleep  She remains Stalevo 200 5 times a day (every 4 hours) (6am, 10am, 2pm, 6pm, 10pm) along with Sinemet 25/100mg 1/2 tab in the AM and PM  She is here with her granddaughter who helped with history  She has a caregiver 24 hours a day  She is having trouble swallowing pills  She is now eating minced meat and vegetables  She has periods in which she is not able to move and her blood pressure will be low, reported as "catatonic"  She will not respond and can move her arms  It lasts variable time  If she  She seems to always be moving her hands, they "wander"  This seems to occur out of sleep  She walks with the walker and assistance but now mostly in wheelchair and chair  She has assistance with dressing and showering  The following portions of the patient's history were reviewed and updated as appropriate: allergies, current medications, past family history, past medical history and past social history  Objective:    Blood pressure 102/60, height 5' 2" (1 575 m), weight 45 8 kg (101 lb)  Physical Exam   Constitutional: She appears well-developed  Eyes: Pupils are equal, round, and reactive to light  EOM are normal    Neurological: She is alert  Vitals reviewed  Neurological Exam  Mental Status  Awake and alert  Oriented only to person  Follows one-step commands  Attention and concentration are normal   MOCA 4/14/17 - 8/30     Follows commands with difficulty  Cranial Nerves  CN II: Visual fields full to confrontation  CN III, IV, VI: Extraocular movements intact bilaterally   Pupils equal round and reactive to light bilaterally  CN V: Facial sensation is normal   CN VII: Full and symmetric facial movement  CN VIII: Hearing is normal   CN IX, X: Palate elevates symmetrically  Normal gag reflex  CN XI: Shoulder shrug strength is normal   CN XII: Tongue midline without atrophy or fasciculations  Motor    Moderate hypomimia 2, moderate hypophonia 2  Mild to moderate bradykinesia on FT 2,2 with decrement more on the left, HG 1,2, JOSE 2,2, heel taps 1,1  Difficulty following commands  No action tremor  No resting tremor  No dystonia  Mild dyskinesia of the legs when distracted  Generalized slowness with all movements       Sensory  Light touch is normal in upper and lower extremities  Reflexes  Glabellar tap present  Gait  Arose using hands and required minimal assistance 2  Walks with shortened stride more on the left 2 with the walker  No freezing or festination  Spontaneous retropulsion  Very slowed movements  En bloc turning           ROS:    Review of Systems   Constitutional: Positive for appetite change and fatigue  Negative for fever  HENT: Positive for trouble swallowing and voice change  Negative for hearing loss and tinnitus  Eyes: Positive for visual disturbance  Negative for photophobia and pain  Respiratory: Negative  Negative for shortness of breath  Cardiovascular: Negative  Negative for palpitations  Gastrointestinal: Positive for constipation  Negative for nausea and vomiting  Endocrine: Negative  Negative for cold intolerance and heat intolerance  Genitourinary: Negative  Negative for dysuria, frequency and urgency  Musculoskeletal: Positive for back pain  Negative for myalgias and neck pain  Skin: Negative  Negative for rash  Neurological: Positive for dizziness, weakness and light-headedness  Negative for tremors, seizures, syncope, facial asymmetry, speech difficulty, numbness and headaches  Hematological: Negative    Does not bruise/bleed easily  Psychiatric/Behavioral: Positive for agitation, confusion and sleep disturbance  Negative for hallucinations  The patient is nervous/anxious

## 2018-11-26 NOTE — ASSESSMENT & PLAN NOTE
Advanced Pd, now wheelchair bound with dementia  Will continue on current medications  If still having a hard time swallowing pills can consider switching to only Sinemet  or a dissolvable form of Sinemet  We will then need to see if medications continue to last 4 hours or if we need to increased the frequency of dosing  WE can also consider switching to Rytary (immediate and long acting beads of Sinemet), downfall may be cost of medication  An alternative would be Duopa pump which is levodopa infused via J-PEG tube into the small intestine      Will refer for PT

## 2018-12-21 ENCOUNTER — APPOINTMENT (EMERGENCY)
Dept: RADIOLOGY | Facility: HOSPITAL | Age: 83
End: 2018-12-21
Payer: MEDICARE

## 2018-12-21 ENCOUNTER — HOSPITAL ENCOUNTER (OUTPATIENT)
Facility: HOSPITAL | Age: 83
Setting detail: OBSERVATION
Discharge: HOME | End: 2018-12-21
Attending: EMERGENCY MEDICINE | Admitting: INTERNAL MEDICINE
Payer: MEDICARE

## 2018-12-21 VITALS
WEIGHT: 112.66 LBS | HEIGHT: 62 IN | SYSTOLIC BLOOD PRESSURE: 167 MMHG | RESPIRATION RATE: 18 BRPM | BODY MASS INDEX: 20.73 KG/M2 | DIASTOLIC BLOOD PRESSURE: 72 MMHG | OXYGEN SATURATION: 95 % | TEMPERATURE: 97.8 F | HEART RATE: 56 BPM

## 2018-12-21 DIAGNOSIS — R03.0 ELEVATED BLOOD PRESSURE READING: ICD-10-CM

## 2018-12-21 DIAGNOSIS — R07.9 CHEST PAIN: Primary | ICD-10-CM

## 2018-12-21 PROBLEM — H81.10 BPPV (BENIGN PAROXYSMAL POSITIONAL VERTIGO): Status: ACTIVE | Noted: 2017-02-11

## 2018-12-21 LAB
ALBUMIN SERPL BCP-MCNC: 3.6 G/DL (ref 3.5–5)
ALP SERPL-CCNC: 132 U/L (ref 46–116)
ALT SERPL W P-5'-P-CCNC: 13 U/L (ref 12–78)
ANION GAP SERPL CALCULATED.3IONS-SCNC: 5 MMOL/L (ref 4–13)
AST SERPL W P-5'-P-CCNC: 25 U/L (ref 5–45)
ATRIAL RATE: 61 BPM
BASOPHILS # BLD AUTO: 0.04 THOUSANDS/ΜL (ref 0–0.1)
BASOPHILS NFR BLD AUTO: 1 % (ref 0–1)
BILIRUB SERPL-MCNC: 0.54 MG/DL (ref 0.2–1)
BUN SERPL-MCNC: 32 MG/DL (ref 5–25)
CALCIUM SERPL-MCNC: 9.4 MG/DL (ref 8.3–10.1)
CHLORIDE SERPL-SCNC: 103 MMOL/L (ref 100–108)
CO2 SERPL-SCNC: 29 MMOL/L (ref 21–32)
CREAT SERPL-MCNC: 0.84 MG/DL (ref 0.6–1.3)
EOSINOPHIL # BLD AUTO: 0.14 THOUSAND/ΜL (ref 0–0.61)
EOSINOPHIL NFR BLD AUTO: 2 % (ref 0–6)
ERYTHROCYTE [DISTWIDTH] IN BLOOD BY AUTOMATED COUNT: 13.5 % (ref 11.6–15.1)
GFR SERPL CREATININE-BSD FRML MDRD: 62 ML/MIN/1.73SQ M
GLUCOSE SERPL-MCNC: 85 MG/DL (ref 65–140)
HCT VFR BLD AUTO: 43.6 % (ref 34.8–46.1)
HGB BLD-MCNC: 14.1 G/DL (ref 11.5–15.4)
IMM GRANULOCYTES # BLD AUTO: 0.02 THOUSAND/UL (ref 0–0.2)
IMM GRANULOCYTES NFR BLD AUTO: 0 % (ref 0–2)
LYMPHOCYTES # BLD AUTO: 2.34 THOUSANDS/ΜL (ref 0.6–4.47)
LYMPHOCYTES NFR BLD AUTO: 35 % (ref 14–44)
MCH RBC QN AUTO: 31.8 PG (ref 26.8–34.3)
MCHC RBC AUTO-ENTMCNC: 32.3 G/DL (ref 31.4–37.4)
MCV RBC AUTO: 98 FL (ref 82–98)
MONOCYTES # BLD AUTO: 0.48 THOUSAND/ΜL (ref 0.17–1.22)
MONOCYTES NFR BLD AUTO: 7 % (ref 4–12)
NEUTROPHILS # BLD AUTO: 3.6 THOUSANDS/ΜL (ref 1.85–7.62)
NEUTS SEG NFR BLD AUTO: 55 % (ref 43–75)
NRBC BLD AUTO-RTO: 0 /100 WBCS
P AXIS: 97 DEGREES
PLATELET # BLD AUTO: 173 THOUSANDS/UL (ref 149–390)
PMV BLD AUTO: 11.2 FL (ref 8.9–12.7)
POTASSIUM SERPL-SCNC: 4.5 MMOL/L (ref 3.5–5.3)
PR INTERVAL: 132 MS
PROT SERPL-MCNC: 7.4 G/DL (ref 6.4–8.2)
QRS AXIS: 26 DEGREES
QRSD INTERVAL: 128 MS
QT INTERVAL: 430 MS
QTC INTERVAL: 432 MS
RBC # BLD AUTO: 4.43 MILLION/UL (ref 3.81–5.12)
SODIUM SERPL-SCNC: 137 MMOL/L (ref 136–145)
T WAVE AXIS: 51 DEGREES
TROPONIN I SERPL-MCNC: <0.02 NG/ML
TROPONIN I SERPL-MCNC: <0.02 NG/ML
VENTRICULAR RATE: 61 BPM
WBC # BLD AUTO: 6.62 THOUSAND/UL (ref 4.31–10.16)

## 2018-12-21 PROCEDURE — 71045 X-RAY EXAM CHEST 1 VIEW: CPT

## 2018-12-21 PROCEDURE — 90662 IIV NO PRSV INCREASED AG IM: CPT | Performed by: INTERNAL MEDICINE

## 2018-12-21 PROCEDURE — 99285 EMERGENCY DEPT VISIT HI MDM: CPT

## 2018-12-21 PROCEDURE — 84484 ASSAY OF TROPONIN QUANT: CPT | Performed by: EMERGENCY MEDICINE

## 2018-12-21 PROCEDURE — 80053 COMPREHEN METABOLIC PANEL: CPT | Performed by: EMERGENCY MEDICINE

## 2018-12-21 PROCEDURE — 93005 ELECTROCARDIOGRAM TRACING: CPT

## 2018-12-21 PROCEDURE — 84484 ASSAY OF TROPONIN QUANT: CPT | Performed by: INTERNAL MEDICINE

## 2018-12-21 PROCEDURE — 36415 COLL VENOUS BLD VENIPUNCTURE: CPT

## 2018-12-21 PROCEDURE — 85025 COMPLETE CBC W/AUTO DIFF WBC: CPT | Performed by: EMERGENCY MEDICINE

## 2018-12-21 PROCEDURE — G0008 ADMIN INFLUENZA VIRUS VAC: HCPCS | Performed by: INTERNAL MEDICINE

## 2018-12-21 RX ORDER — CARBIDOPA, LEVODOPA AND ENTACAPONE 50; 200; 200 MG/1; MG/1; MG/1
1 TABLET, FILM COATED ORAL
Status: DISCONTINUED | OUTPATIENT
Start: 2018-12-21 | End: 2018-12-21

## 2018-12-21 RX ORDER — ACETAMINOPHEN 325 MG/1
650 TABLET ORAL EVERY 6 HOURS PRN
Status: DISCONTINUED | OUTPATIENT
Start: 2018-12-21 | End: 2018-12-21 | Stop reason: HOSPADM

## 2018-12-21 RX ORDER — OXYBUTYNIN CHLORIDE 5 MG/1
5 TABLET, EXTENDED RELEASE ORAL
Status: DISCONTINUED | OUTPATIENT
Start: 2018-12-21 | End: 2018-12-21 | Stop reason: HOSPADM

## 2018-12-21 RX ORDER — MIRTAZAPINE 15 MG/1
15 TABLET, FILM COATED ORAL
Status: DISCONTINUED | OUTPATIENT
Start: 2018-12-21 | End: 2018-12-21 | Stop reason: HOSPADM

## 2018-12-21 RX ORDER — HEPARIN SODIUM 5000 [USP'U]/ML
5000 INJECTION, SOLUTION INTRAVENOUS; SUBCUTANEOUS EVERY 8 HOURS SCHEDULED
Status: DISCONTINUED | OUTPATIENT
Start: 2018-12-21 | End: 2018-12-21 | Stop reason: HOSPADM

## 2018-12-21 RX ORDER — ENTACAPONE 200 MG/1
200 TABLET ORAL
Status: DISCONTINUED | OUTPATIENT
Start: 2018-12-21 | End: 2018-12-21 | Stop reason: HOSPADM

## 2018-12-21 RX ADMIN — INFLUENZA A VIRUS A/MICHIGAN/45/2015 X-275 (H1N1) ANTIGEN (FORMALDEHYDE INACTIVATED), INFLUENZA A VIRUS A/SINGAPORE/INFIMH-16-0019/2016 IVR-186 (H3N2) ANTIGEN (FORMALDEHYDE INACTIVATED), AND INFLUENZA B VIRUS B/MARYLAND/15/2016 BX-69A (A B/COLORADO/6/2017-LIKE VIRUS) ANTIGEN (FORMALDEHYDE INACTIVATED) 0.5 ML: 60; 60; 60 INJECTION, SUSPENSION INTRAMUSCULAR at 11:03

## 2018-12-21 NOTE — DISCHARGE INSTRUCTIONS
Angina   WHAT YOU NEED TO KNOW:   Angina is pain, pressure, or tightness that is usually felt in your chest  Chest pain may come on when you are stressed or do physical activities, such as walking or exercising  Angina is caused by decreased blood flow and oxygen to your heart  These are often caused by atherosclerosis (hardening of the arteries)  If left untreated, angina may get worse, increase your risk for a heart attack, or become life-threatening  DISCHARGE INSTRUCTIONS:   Call 911 if:   · You have any of the following signs of a heart attack:      ¨ Squeezing, pressure, or pain in your chest that lasts longer than 5 minutes or returns    ¨ Discomfort or pain in your back, neck, jaw, stomach, or arm     ¨ Trouble breathing    ¨ Nausea or vomiting    ¨ Lightheadedness or a sudden cold sweat, especially with chest pain or trouble breathing    · You have chest pain that does not go away after you take medicine as directed  · You lose feeling in your face, arms, or legs, or you suddenly feel weak  Seek care immediately if:   · Your angina is happening more frequently, lasting longer, or causing worse pain  Contact your healthcare provider if:   · You are dizzy or nauseated after you take your medicine  · You have shortness of breath at rest     · You have new or worse swelling in your feet or ankles  · You have questions or concerns about your condition or care  Medicines: You may need any of the following:  · Antiplatelets , such as aspirin, help prevent blood clots  Take your antiplatelet medicine exactly as directed  These medicines make it more likely for you to bleed or bruise  If you are told to take aspirin, do not take acetaminophen or ibuprofen instead  · Blood thinners    help prevent blood clots  Examples of blood thinners include heparin and warfarin  Clots can cause strokes, heart attacks, and death   The following are general safety guidelines to follow while you are taking a blood thinner:    ¨ Watch for bleeding and bruising while you take blood thinners  Watch for bleeding from your gums or nose  Watch for blood in your urine and bowel movements  Use a soft washcloth on your skin, and a soft toothbrush to brush your teeth  This can keep your skin and gums from bleeding  If you shave, use an electric shaver  Do not play contact sports  ¨ Tell your dentist and other healthcare providers that you take anticoagulants  Wear a bracelet or necklace that says you take this medicine  ¨ Do not start or stop any medicines unless your healthcare provider tells you to  Many medicines cannot be used with blood thinners  ¨ Tell your healthcare provider right away if you forget to take the medicine, or if you take too much  ¨ Warfarin  is a blood thinner that you may need to take  The following are things you should be aware of if you take warfarin  § Foods and medicines can affect the amount of warfarin in your blood  Do not make major changes to your diet while you take warfarin  Warfarin works best when you eat about the same amount of vitamin K every day  Vitamin K is found in green leafy vegetables and certain other foods  Ask for more information about what to eat when you are taking warfarin  § You will need to see your healthcare provider for follow-up visits when you are on warfarin  You will need regular blood tests  These tests are used to decide how much medicine you need  · Other medicines  may be given to open the arteries to your heart, slow your heartbeat, or decrease your blood pressure or cholesterol  · Do not take certain medicines without asking your healthcare provider first   These include NSAIDs, herbal or vitamin supplements, or hormones (estrogen or progestin)  · Take your medicine as directed  Contact your healthcare provider if you think your medicine is not helping or if you have side effects   Tell him or her if you are allergic to any medicine  Keep a list of the medicines, vitamins, and herbs you take  Include the amounts, and when and why you take them  Bring the list or the pill bottles to follow-up visits  Carry your medicine list with you in case of an emergency  Follow up with your healthcare provider as directed:  Keep a record or a calendar with details about your chest pain  Every time you have pain or symptoms, record what the pain is like, how long it lasts, and how severe it is  Also record what you are doing when the pain starts, and what makes it go away  Bring this with you every time you see your healthcare provider  Write down your questions so you remember to ask them during your visits  Cardiac rehabilitation:  Your healthcare provider may recommend that you attend cardiac rehabilitation (rehab)  This is a program run by specialists who will help you safely strengthen your heart and prevent more heart disease  The plan includes exercise, relaxation, stress management, and heart-healthy nutrition  Healthcare providers will also check to make sure any medicines you are taking are working  The plan may also include instructions for when you can drive, return to work, and do other normal daily activities  Manage angina:   · Do not smoke  Nicotine and other chemicals in cigarettes and cigars can cause heart and lung damage  Ask your healthcare provider for information if you currently smoke and need help to quit  E-cigarettes or smokeless tobacco still contain nicotine  Talk to your healthcare provider before you use these products  · Maintain a healthy weight  When you weigh more than is healthy for you, your heart must work harder  You are at higher risk for serious health problems if you are overweight  Ask your healthcare provider how much you should weigh  Ask him to help you create a weight loss plan if you are overweight  · Ask about activity    Your healthcare provider will tell you which activities to limit or avoid  Ask about the best exercise plan for you  · Eat heart-healthy foods  Include fresh fruits and vegetables in your meal plan  Choose low-fat foods, such as skim or 1% fat milk, low-fat cheese and yogurt, fish, chicken (without skin), and lean meats  Eat two 4-ounce servings of fish high in omega-3 fats each week, such as salmon, fresh tuna, and herring  Do not eat foods that are high in sodium, such as canned foods, potato chips, salty snacks, and cold cuts  Put less table salt on your food  · Avoid activities that cause angina  Pay attention to your symptoms and find out what seems to make your angina worse  · Ask if you should have a flu vaccine  The flu vaccine will decrease your risk for an infection  An infection can put more stress on your heart and worsen your angina  © 2017 [employer] Information is for End User's use only and may not be sold, redistributed or otherwise used for commercial purposes  All illustrations and images included in CareNotes® are the copyrighted property of A [employer] M [employer] Inc  or Gustavo Weathers  The above information is an  only  It is not intended as medical advice for individual conditions or treatments  Talk to your doctor, nurse or pharmacist before following any medical regimen to see if it is safe and effective for you

## 2018-12-21 NOTE — UTILIZATION REVIEW
Initial Clinical Review    Admission: Date/Time/Statement: OBSERVATION 12/21/18 @ 0532    Orders Placed This Encounter   Procedures    Place in Observation (expected length of stay for this patient is less than two midnights)     Standing Status:   Standing     Number of Occurrences:   1     Order Specific Question:   Admitting Physician     Answer:   Gabe Ruffin     Order Specific Question:   Level of Care     Answer:   Med Surg [16]     ED: Date/Time/Mode of Arrival:   ED Arrival Information     Expected Arrival [address] Gladys Pathak of Arrival Escorted By Service Admission Type    12/21/2018 03:45 12/21/2018 03:45 Urgent Ambulance [facility] General Medicine Urgent    Arrival Complaint    Chest Pain        Chief Complaint:   Chief Complaint   Patient presents with    Chest Pain     approx 0315 pt woke up with chst pain and alerted her care taker  Called 911  Chest pain no longer present in route to hospital  12-lead pre-hospital normal sinus rhythm  Denies CP at this time  Confusion at baseline     History of Illness: Ivett Mcneil is a 80 y o  female with a  PMH significant for HLD, parkinson's disease, depresion, overactive bladder, gastric ulcer, anxiety who presented today to the Emergency Department secondary to chest pain  History was obtained from the caregiver  She woke up this morning at 3 o clock a m and starting taking off all her blankets and seemed very agitated  On questioning she admitted to caretaker that she had chest pain located substernal, with radiation to the back, she described the pain as a squeezing pain  She stated the pain was severe at that time, it lasted for approximately 30 minutes then spontaneously resolved by the time the ambulance came, no alleviating or aggravating factors  The pain is not reproducible  No associated diaphoresis  No nausea or vomiting  Not associated with shortness of breath  Did not take any medicines for alleviation of her symptoms   She has never had pain like this before  No history of heart attacks, no history of catherization, no history of stress tests  No history of blood clots  She does not normally walk  No recent injuries or falls in the last couple weeks  No known family history of heart disease  She denies any other associated signs or symptoms  ED Vital Signs:   ED Triage Vitals   Temperature Pulse Respirations Blood Pressure SpO2   12/21/18 0348 12/21/18 0348 12/21/18 0348 12/21/18 0401 12/21/18 0402   97 8 °F (36 6 °C) 65 18 (!) 194/83 98 %      Temp Source Heart Rate Source Patient Position - Orthostatic VS BP Location FiO2 (%)   12/21/18 0348 12/21/18 0348 12/21/18 0430 12/21/18 0348 --   Oral Monitor Lying Left arm       Pain Score       12/21/18 0348       No Pain        Wt Readings from Last 1 Encounters:   12/21/18 51 1 kg (112 lb 10 5 oz)     Vital Signs (abnormal):     12/21/18 0430  --  60  20   198/78  93 %  None (Room air)  Lying   12/21/18 0401  --  --  18   194/83  --  --  --     Abnormal Labs:    BUN 32  Alk phos 132  Trop WNL     Diagnostic Test Results:     12/21 CXR - no acute disease   12/21 ECG - SR with wide QRS interval and complete RBBB     ED Treatment:   Medication Administration from 12/21/2018 0345 to 12/21/2018 0606     None        Past Medical/Surgical History:    Active Ambulatory Problems     Diagnosis Date Noted    Parkinson disease ([place] [address] ) 04/06/2016    Hypoglycemia 04/21/2016    Femur fracture, right ([facility] ) 02/22/2017    DDD (degenerative disc disease), lumbar 02/22/2017    Depression with anxiety 02/22/2017    Anemia associated with acute blood loss 03/01/2017    Displaced fracture of greater trochanter of left femur, initial encounter for closed fracture ([facility] ) 02/06/2018    Dementia due to Parkinson's disease without behavioral disturbance ([facility] ) 05/25/2018    Dysphagia 11/26/2018    BPPV (benign paroxysmal positional vertigo) 02/11/2017    Candida esophagitis ([facility] ) 02/24/2016    Chronic low back pain 2016    Chronic pain disorder 2016    Dextroscoliosis     Helicobacter pylori (H  pylori) infection 2015    Severe scoliosis 2016    Spondylolisthesis at L4-L5 level 2016     Resolved Ambulatory Problems     Diagnosis Date Noted    Lightheadedness 2016    Blurry vision 2016    HLD (hyperlipidemia) 2016    Aortic stenosis 2016    Acute encephalopathy 2016     Past Medical History:   Diagnosis Date    Anxiety     Dementia     Depression     Gastric ulcer     H/O:  section     HLD (hyperlipidemia) 2016    Hypercholesteremia     [facility]'s disease ([facility] )     Raynaud's disease      Admitting Diagnosis: Chest pain [R07 9]  Elevated blood pressure reading [R03 0]    Age/Sex: 80 y o  female    Assessment/Plan:   1  Chest pain - Non cardiac  Patient describes sharp pain located in center of chest with radiation to the back, concerning originally for dissection but pain spontaneously resolved after short time  No associated diaphoresis, nausea or vomiting  EKG shows RBBB, noted in old EKG  Troponins negative X 1  Pain has since resolved, no history of CAD, stress test or catherization  ? Will trend troponins + EKG  ? Will not pursue CT dissection study as patient is now asymptomatic, hemodynamically stable  ? Low likelihood for cardiac etiology  ? Will not order statins as patient's son does not want this as patient was previously on these medicines and has since been taken off    2   Parkinson's    ? Patient's family to bring in the patient's Stalevo as this is not on formulary and per pharmacy it is not a good idea to switch around patient's parkinson's meds  3  Elevated blood pressure  ?  Son does not want this treated, patient was previously on treatment but wants to minimize how many medications patient is on    4  Dysphagia   ? Neurology has recommended a "moist soft" diet     ? Will place patient on mechanical soft 2    5  Bladder spasms   ? Place on Oxybutynin, do not have patient's vesicare on formula     ?    Code Status: Level 1 - Full Code  VTE Pharmacologic Prophylaxis: Heparin   VTE Mechanical Prophylaxis: sequential compression device  Admission Status: OBSERVATION     Admission Orders:  Scheduled Meds:   Current Facility-Administered Medications:  acetaminophen 650 mg Oral Q6H PRN   entacapone 200 mg Oral 5x Daily   And      carbidopa-levodopa 2 tablet Oral 5x Daily   heparin (porcine) 5,000 Units Subcutaneous Q8H [address]   influenza vaccine 0 5 mL Intramuscular Prior to discharge   mirtazapine 15 mg Oral HS   multivitamin-minerals 1 tablet Oral Daily   oxybutynin 5 mg Oral After Dinner     PRN Meds:   acetaminophen    influenza vaccine    Tele   SCDs  Up w/ assist   Dysphagia diet

## 2018-12-21 NOTE — PROGRESS NOTES
Senior Admission Note   Unit/Bed # ED 3 Encounter: [id number]  SOD Team B           Julio Gurrola 80 y o  female [id number]       Patient seen and examined  Reviewed H&P per Dr Lang Aldana  Agree with the assessment and plan  Ms Julio Gurrola is a 80 y o  female with PMH significant for dementia secondary to advanced Parkinson's disease  She presented to the emergency department today after waking suddenly in the middle night with sharp central chest pain that radiated to her back and resolved within 30 minutes without any intervention  History is difficult to obtain from patient considering her significant dementia  She denies any history of similar pain in the past   She denies any history of coronary artery disease  Assessment/Plan: Principal Problem:    Chest pain  Active Problems:    Parkinson disease ([facility] )    DDD (degenerative disc disease), lumbar    Depression with anxiety    Dementia due to Parkinson's disease without behavioral disturbance ([facility] )    Dysphagia     Chest pain-noncardiac  Sharp chest pain is a negative predictor for cardiac chest pain  Patient does not have any history of coronary artery disease per documentation  It is low likelihood that patient's chest pain is related to cardiac cause  The chest pain has since resolved and has not returned without any specific exacerbating or alleviating factors  Patient is in mobile at baseline   -initial troponin is negative, checked x3  -maintain on telemetry monitoring  -no need for aspirin at this time  -EKG performed in the emergency department shows right bundle branch block which is stable from historical EKGs   -if chest pain returns, may treat with nitroglycerin as needed      Parkinson's disease and advanced dementia  Continue Sinemet q i d   Continue monitor clinically  -maintain appropriate sleep-wake cycle and frequent reorientation needed    Elevated blood pressure  Patient's initial blood pressure was greater than 200 on presentation without signs of end-organ injury/damage  In discussion with patient's son at bedside, it is requested to not continue to treat elevated blood pressure or cholesterol as this will not show any future/prolonged benefit  Agree with this plan, as blood pressure management and cholesterol management are for improved 10 year mortality in given patient's age as well as advanced comorbid conditions   -treat elevated blood pressures only if patient is symptomatic      Admit as OBSERVATION to  Dr Chapin DESIR Moder  Diet:  Moist/soft diet  CODE STATUS: Full Code  DVT PPX: Heparin subq and SCDs bilaterally      Stefan Kaiser MD

## 2018-12-21 NOTE — H&P
INTERNAL MEDICINE HISTORY AND PHYSICAL  ED 03 SOD Team B     NAME: Adeline Huffman  AGE: 80 y o  SEX: female  : 1930   MRN: [id number]  ENCOUNTER: [id number]    DATE: 2018  TIME: 5:23 AM    Primary Care Physician: Tamara Pineda MD  Admitting Provider: No admitting provider for patient encounter  Chief complaint: Chest pain  History of Present illness  Adeline Huffman is a 80 y o  female with a  PMH significant for HLD, parkinson's disease, depresion, overactive bladder, gastric ulcer, anxiety who presented today to the Emergency Department secondary to chest pain  History was obtained from the caregiver  She woke up this morning at 3 o clock a m and starting taking off all her blankets and seemed very agitated  On questioning she admitted to caretaker that she had chest pain located substernal, with radiation to the back, she described the pain as a squeezing pain  She stated the pain was severe at that time, it lasted for approximately 30 minutes then spontaneously resolved by the time the ambulance came, no alleviating or aggravating factors  The pain is not reproducible  No associated diaphoresis  No nausea or vomiting  Not associated with shortness of breath  Did not take any medicines for alleviation of her symptoms  She has never had pain like this before  No history of heart attacks, no history of catherization, no history of stress tests  No history of blood clots  She does not normally walk  No recent injuries or falls in the last couple weeks  No known family history of heart disease  She denies any other associated signs or symptoms  In the ER A CMP, CBC, Troponin were all drawn  Troponin was negative  CMP was notable for an elevated alkaline phosphatase at 132, and elevated BUN at 32  CXR showed no acute cardiopulmonary disease, wet read by me  EKG showed a complete RBBB  She was not given Asprin       Review of Systems        Review of Systems   Constitutional: Negative for activity change  HENT: Negative for congestion  Eyes: Negative for discharge  Respiratory: Negative for apnea and chest tightness  Cardiovascular: Negative for leg swelling  Gastrointestinal: Negative for abdominal distention  Endocrine: Negative for cold intolerance  Genitourinary: Negative for difficulty urinating  Musculoskeletal: Negative for arthralgias  Allergic/Immunologic: Negative for environmental allergies  Neurological: Negative for dizziness  Hematological: Negative for adenopathy  Psychiatric/Behavioral: Negative for agitation  Past Medical History     Past Medical History:   Diagnosis Date    Anxiety     Dementia     Depression     Gastric ulcer     H/O:  section     HLD (hyperlipidemia) 2016    Hypercholesteremia     [facility]'s disease ([facility] [address] )     Raynaud's disease        Past Surgical History     Past Surgical History:   Procedure Laterality Date     SECTION      CHOLECYSTECTOMY      ESOPHAGOGASTRODUODENOSCOPY      KNEE ARTHROSCOPY Left     MS COLONOSCOPY FLX DX W/COLLJ SPEC WHEN PFRMD N/A 3/18/2016    Procedure: COLONOSCOPY;  Surgeon: Himanshu Chinchilla MD;  [facility]; Service: Gastroenterology    MS ESOPHAGOGASTRODUODENOSCOPY TRANSORAL DIAGNOSTIC N/A 2016    Procedure: ESOPHAGOGASTRODUODENOSCOPY (EGD); Surgeon: Himanshu Chinchilla MD;  [facility]; Service: Gastroenterology    MS OPEN RX FEMUR FX+INTRAMED CHARLEY Right 2017    Procedure: INSERTION NAIL IM FEMUR ANTEGRADE (TROCHANTERIC); Surgeon: Jackelyn Strickland MD;  [facility];  Service: Orthopedics       Social History     History   Alcohol Use    0 6 oz/week    1 Glasses of wine per week     Comment: social     History   Drug Use No     History   Smoking Status    Never Smoker   Smokeless Tobacco    Never Used       Family History     History reviewed  No pertinent family history      Medications Prior to Admission     Prior to Admission medications    Medication Sig Start Date End Date Taking? Authorizing Provider   Acetaminophen 325 MG CAPS Take by mouth   Yes Historical Provider, MD   Calcium Carbonate-Vitamin D (OYST-KANDI D PO) Take 1 tablet by mouth daily 500mg OsCal/Calcium    Yes Historical Provider, MD   carbidopa-levodopa-entacapone (STALEVO) -200 MG per tablet Take 1tab 5 times a day (every 4 hours) 3/23/18  Yes Claudia Rios PA-C   Cholecalciferol (VITAMIN D PO) Take 1 tablet by mouth daily     Yes Historical Provider, MD   Cyanocobalamin (VITAMIN B-12 PO) Take by mouth  Yes Historical Provider, MD   dextrose 50 % Infuse 25 mL into a venous catheter as needed for low blood sugar (for blood glucose <60) for up to 30 days 3/6/17 12/21/18 Yes Osito Kraus MD   Doxylamine Succinate, Sleep, (UNISOM PO) Take 1 capsule by mouth daily at bedtime   Yes Historical Provider, MD   glucosamine 500 MG CAPS capsule Take 1,000 mg by mouth daily     Yes Historical Provider, MD   Melatonin-Pyridoxine (MELATIN PO) Take 1 tablet by mouth daily at bedtime   Yes Historical Provider, MD   mirtazapine (REMERON) 15 mg tablet Take 1 tablet by mouth daily at bedtime for 30 days 3/6/17 12/21/18 Yes Osito Kraus MD   Multiple Vitamins-Minerals (CENTRUM SILVER ADULT 50+ PO) Take by mouth daily  Yes Historical Provider, MD   solifenacin (VESICARE) 5 mg tablet Take 5 mg by mouth daily after dinner    Historical Provider, MD       Allergies     Allergies   Allergen Reactions    Penicillins      Unknown reaction  Does not remember when last had it       Objective     Vitals:    12/21/18 0348 12/21/18 0401 12/21/18 0402 12/21/18 0430   BP:  (!) 194/83  (!) 198/78   BP Location:    Right arm   Pulse: 65   60   Resp: 18 18  20   Temp: 97 8 °F (36 6 °C)      TempSrc: Oral      SpO2:   98% 93%     There is no height or weight on file to calculate BMI    No intake or output data in the 24 hours ending 12/21/18 0523  Invasive Devices     Peripheral Intravenous Line            Peripheral IV 12/21/18 Right Wrist less than 1 day                Physical Exam  GENERAL: Appears well-developed and well-nourished  Appears in no acute distress   HEENT: Normocephalic and atraumatic  No scleral icterus  [facility] EOMI B/L  MM moist    NECK: Neck supple with no lymphadenopathy  Trachea midline  No JVD  CARDIOVASCULAR: S1 and S2 are present  2/6 systolic murmur noted on right upper sternal border  RESPIRATORY: CTA B/L, no rales, rhonci or wheezes  Normal respiratory expansion  ABDOMINAL: Bowel sounds present in all 4 quadrants, non-tender, soft, non-distended  No organomegaly, rebound, or guarding  EXTREMITIES: ; no cyanosis, clubbing, edema  ROM intact  MOONEY x4   MUSCULOSKELETAL: No joint tenderness, deformity or swelling, full range of motion without pain  NEUROLOGIC: Patient is alert and oriented to person, place, and situation  No sensory or motor deficits  CN 2-12 intact  SKIN: Skin is warm and dry  No skin lesions are present  No rashes  PSYCHIATRIC: Normal mood and affect     Lab Results:   Results from last 7 days  Lab Units 12/21/18  0358   WBC Thousand/uL 6 62   HEMOGLOBIN g/dL 14 1   HEMATOCRIT % 43 6   PLATELETS Thousands/uL 173   NEUTROS PCT % 55   MONOS PCT % 7      Results from last 7 days  Lab Units 12/21/18  0358   POTASSIUM mmol/L 4 5   CHLORIDE mmol/L 103   CO2 mmol/L 29   BUN mg/dL 32*   CREATININE mg/dL 0 84   CALCIUM mg/dL 9 4   ALK PHOS U/L 132*   ALT U/L 13   AST U/L 25   TROPONIN I ng/mL <0 02   EGFR ml/min/1 73sq m 62               Results from last 7 days  Lab Units 12/21/18  0358   TROPONIN I ng/mL <0 02     No results found for: PHART, DIZ4NGE, PO2ART, RRZ1CAI, Q6RTBJPK, BEART, SOURCE  No components found for: HIV1X2  No results found for: HAV, HEPAIGM, HEPBIGM, HEPBCAB, HBEAG, HEPCAB  Lab Results   Component Value Date    SPEP  03/01/2017     The serum total protein and albumin are decreased   The serum protein electrophoresis shows an increased alpha-1 region  The serum protein electrophoresis shows a decreased beta-1 region  No monoclonal bands noted  Reviewed by: Naomi Higuera MD ([id number]) **Electronic Signature**    No results found for: HGBA1C  Lab Results   Component Value Date    CHOL 196 11/21/2015    CHOL 225 03/15/2015    CHOL 214 10/30/2014    Lab Results   Component Value Date    HDL 57 11/22/2016    HDL 46 06/30/2016    HDL 41 02/11/2016    Lab Results   Component Value Date    LDLCALC 127 (H) 11/22/2016    LDLCALC 135 (H) 06/30/2016    LDLCALC 125 (H) 02/11/2016    Lab Results   Component Value Date    TRIG 66 11/22/2016    TRIG 95 06/30/2016    TRIG 104 02/11/2016     Lab Results   Component Value Date    ENE0EUQSZREN 1 970 12/07/2017    KKC0QCBXPEKO 3 270 02/26/2017    KYH4FWOIZUCZ 3 270 06/30/2016    OOE4JOUZEOCG 2 200 04/21/2016    OAJ5ITZNAKUE 1 500 04/07/2016    JGM9FUZUXZGD 1 000 05/13/2015    FNM4ERPMMEBZ 1 172 03/21/2015    FREET4 1 20 12/07/2017    FREET4 1 2 05/13/2015       Imaging:   XR chest portable   ED Interpretation by Viv Mckoy DO (12/21 4468)   No acute findings       by Xavi Obregon (12/21 4204)          Microbiology:      Urinalysis:      No results found for: AMPHETUR, BDZUR, COCAINEUR, OPIATEUR, PCPUR, THCUR, ETOH, ACTMNPHEN, SALICYLATE    Urine Micro:        EKG, Pathology, and Other Studies: I have personally reviewed pertinent reports  Medications Given in Emergency Department         Assessment and Plan     Problem List     Parkinson disease [facility])    Hypoglycemia    Femur fracture, right (HCC)    DDD (degenerative disc disease), lumbar    Depression with anxiety    Anemia associated with acute blood loss    Displaced fracture of greater trochanter of left femur, initial encounter for closed fracture ([address] )    Dementia due to Parkinson's disease without behavioral disturbance ([facility])    Dysphagia            1   Chest pain - Non cardiac   Patient describes sharp pain located in center of chest with radiation to the back, concerning originally for dissection but pain spontaneously resolved after short time  No associated diaphoresis, nausea or vomiting  EKG shows RBBB, noted in old EKG  Troponins negative X 1  Pain has since resolved, no history of CAD, stress test or catherization  · Will trend troponins + EKG  · Will not pursue CT dissection study as patient is now asymptomatic, hemodynamically stable  · Low likelihood for cardiac etiology  · Will not order statins as patient's son does not want this as patient was previously on these medicines and has since been taken off    2   Parkinson's  · Patient's family to bring in the patient's Stalevo as this is not on formulary and per pharmacy it is not a good idea to switch around patient's parkinson's meds  3  Elevated blood pressure  ·  Son does not want this treated, patient was previously on treatment but wants to minimize how many medications patient is on    4  Dysphagia   ·  Neurology has recommended a "moist soft" diet  · Will place patient on mechanical soft 2    5  Bladder spasms   ·  Place on Oxybutynin, do not have patient's vesicare on formula  ·   Code Status: Level 1 - Full Code  VTE Pharmacologic Prophylaxis: Heparin   VTE Mechanical Prophylaxis: sequential compression device  Admission Status: OBSERVATION    Admission Time  I spent 30 minutes admitting the patient  This involved direct patient contact where I performed a full history and physical, reviewing previous records, and reviewing laboratory and other diagnostic studies  Shagufta Javier DO     Internal Medicine  PGY 1

## 2018-12-21 NOTE — PLAN OF CARE
Problem: Potential for Falls  Goal: Patient will remain free of falls  INTERVENTIONS:  - Assess patient frequently for physical needs  -  Identify cognitive and physical deficits and behaviors that affect risk of falls  -  [place] fall precautions as indicated by assessment   - Educate patient/family on patient safety including physical limitations  - Instruct patient to call for assistance with activity based on assessment  - Modify environment to reduce risk of injury  - Consider OT/PT consult to assist with strengthening/mobility   Outcome: Progressing      Problem: Nutrition/Hydration-ADULT  Goal: Nutrient/Hydration intake appropriate for improving, restoring or maintaining nutritional needs  Monitor and assess patient's nutrition/hydration status for malnutrition (ex- brittle hair, bruises, dry skin, pale skin and conjunctiva, muscle wasting, smooth red tongue, and disorientation)  Collaborate with interdisciplinary team and initiate plan and interventions as ordered  Monitor patient's weight and dietary intake as ordered or per policy  Utilize nutrition screening tool and intervene per policy  Determine patient's food preferences and provide high-protein, high-caloric foods as appropriate       INTERVENTIONS:  - Monitor oral intake, urinary output, labs, and treatment plans  - Assess nutrition and hydration status and recommend course of action  - Evaluate amount of meals eaten  - Assist patient with eating if necessary   - Allow adequate time for meals  - Recommend/ encourage appropriate diets, oral nutritional supplements, and vitamin/mineral supplements  - Order, calculate, and assess calorie counts as needed  - Recommend, monitor, and adjust tube feedings and TPN/PPN based on assessed needs  - Assess need for intravenous fluids  - Provide specific nutrition/hydration education as appropriate  - Include patient/family/caregiver in decisions related to nutrition   Outcome: Progressing      Problem: Prexisting or High Potential for Compromised Skin Integrity  Goal: Skin integrity is maintained or improved  INTERVENTIONS:  - Identify patients at risk for skin breakdown  - Assess and monitor skin integrity  - Assess and monitor nutrition and hydration status  - Monitor labs (i e  albumin)  - Assess for incontinence   - Turn and reposition patient  - Assist with mobility/ambulation  - Relieve pressure over bony prominences  - Avoid friction and shearing  - Provide appropriate hygiene as needed including keeping skin clean and dry  - Evaluate need for skin moisturizer/barrier cream  - Collaborate with interdisciplinary team (i e  Nutrition, Rehabilitation, etc )   - Patient/family teaching   Outcome: Progressing

## 2018-12-21 NOTE — ED ATTENDING ATTESTATION
I, Todd Morin DO, saw and evaluated the patient  I have discussed the patient with the resident/non-physician practitioner and agree with the resident's/non-physician practitioner's findings, Plan of Care, and MDM as documented in the resident's/non-physician practitioner's note, except where noted  All available labs and Radiology studies were reviewed  At this point I agree with the current assessment done in the Emergency Department  I have conducted an independent evaluation of this patient a history and physical is as follows:    51-year-old female presents chest that awoke her from sleep at 3:15 a m  Jelani Marrufo Patient is a poor historian  Chest pain lasted approximately 30 minute and is currently gone  Has history of Parkinson's disease  Denies abdominal pain, no lower extremity edema or pain  On exam- no acute distress, heart regular, lungs clear, abdomen soft nontender, no lower extremity edema    Plan-cardiac labs, EKG, chest x-ray    Critical Care Time  CritCare Time    Procedures

## 2018-12-21 NOTE — DISCHARGE SUMMARY
IMR Discharge Summary - Medical Oralia Cabrera 80 y o  female MRN: [id number]    [address] [facility] Room / Bed: [place] [address] [id number] Encounter: [id number]    BRIEF OVERVIEW    Admitting Provider: Ryan Carrasquillo MD  Discharge Provider: Ryan Carrasquillo MD  Primary Care Physician at Discharge: Ryan Carrasquillo MD    [address] [facility]  Admission Date: 12/21/2018     Discharge Date:  12/21/2018    Hospital Course  This 80years old female with past medical history of Parkinson's disease, hyperlipidemia, depression, overactive bladder, gastric ulcer, and anxiety who was admitted for chest pain that wake her up from sleep radiated to her back  This is an overnight hospitalization without significant event  Her cardiac workup including troponin x2 were negative, EKG that showed sinus rhythm with  RBBB cited in the past otherwise no ST elevation or depression  She also had chest x-ray that was unremarkable  There was no concern for aortic dissection clinically therefore deferred further imaging  She also has been monitored through telemetry that did not show any arrhythmia or ST changes other than episodic bradycardia in the 50s  We decided not to pursue the further cardiac workup as her pretest probability was very low using raulito Forester model her risk 33%      Presenting Problem/History of Present Illness  Principal Problem (Resolved):    Chest pain  Active Problems:    Parkinson disease (HCC)    DDD (degenerative disc disease), lumbar    Depression with anxiety    Dementia due to Parkinson's disease without behavioral disturbance ([address] )    Dysphagia   Upon discharge her physical exam showed vital signs:  Blood pressure 167/72, heart rate 56, RR 18, SpO2 95% room air, temperature 97 8° F   General she looks sleepy even though her eyes are wide open has right upper extremity resting tremor appears in no acute distress lying comfortable  CVS S1/S2 normal regular rate and rhythm there is diastolic murmur in the left sternal border  Lungs diminished air entry obviously not able to take deep breath as she is very sleepy  Abdomen nondistended, soft, nontender  Extremities no lower extremity edema        Diagnostic Procedures Performed  Imaging Studies:  Chest x-ray:     Unremarkable  Pertinent Labs: Therapeutic Procedures Performed  Non    Test Results Pending at Discharge:  None    Medications     Medication List to be Continued at Discharge  Current Discharge Medication List      CONTINUE these medications which have NOT CHANGED    Details   Acetaminophen 325 MG CAPS Take by mouth      Calcium Carbonate-Vitamin D (OYST-KANDI D PO) Take 1 tablet by mouth daily 500mg OsCal/Calcium       carbidopa-levodopa-entacapone (STALEVO) -200 MG per tablet Take 1tab 5 times a day (every 4 hours)  Qty: 450 tablet, Refills: 3    Associated Diagnoses: Parkinson disease (HCC)      Cholecalciferol (VITAMIN D PO) Take 1 tablet by mouth daily        Cyanocobalamin (VITAMIN B-12 PO) Take by mouth  dextrose 50 % Infuse 25 mL into a venous catheter as needed for low blood sugar (for blood glucose <60) for up to 30 days  Qty: 50 mL, Refills: 0      Doxylamine Succinate, Sleep, (UNISOM PO) Take 1 capsule by mouth daily at bedtime      glucosamine 500 MG CAPS capsule Take 1,000 mg by mouth daily        Melatonin-Pyridoxine (MELATIN PO) Take 1 tablet by mouth daily at bedtime      mirtazapine (REMERON) 15 mg tablet Take 1 tablet by mouth daily at bedtime for 30 days  Qty: 30 tablet, Refills: 0      Multiple Vitamins-Minerals (CENTRUM SILVER ADULT 50+ PO) Take by mouth daily  solifenacin (VESICARE) 5 mg tablet Take 5 mg by mouth daily after dinner           Current Discharge Medication List        Current Discharge Medication List          Allergies  Allergies   Allergen Reactions    Penicillins      Unknown reaction    Does not remember when last had it     Discharge Diet: regular diet  Activity restrictions: As tolerated  Discharge Condition: fair  Discharged With Lines: no    Discharge Disposition: Released to SNF/TCU/ [address] / Family Member Name:  Anthony Miller ( Son)  Phone Number:  [phone]  Outpatient Follow-Up  yes      Follow up:  PCP  Date and time:  Next 2 weeks  [address]  [place] [address]  Follow up within next:  2 weeks      Code Status: Level 1 - Full Code  Advance Directive and Living Will: <no information>  Power of :    POLST:      Discharge  Statement   I spent 30 minutes minutes discharging the patient  This time was spent on the day of discharge  I had direct contact with the patient on the day of discharge  Additional documentation is required if more than 30 minutes were spent on discharge         Zara Virgen MD  [facility]  Internal medicine resident

## 2018-12-21 NOTE — PROGRESS NOTES
Spoke with Dr Martin Findgina with [facility] B to verify that we do not need 3rd troponin or EKG  Also made him aware the patient did not take AM pills due to sleeping and family request not to wake her

## 2018-12-21 NOTE — ED PROVIDER NOTES
History  Chief Complaint   Patient presents with    Chest Pain     approx 0315 pt woke up with chst pain and alerted her care taker  Called 911  Chest pain no longer present in route to hospital  12-lead pre-hospital normal sinus rhythm  Denies CP at this time  Confusion at baseline     An 70-year-old female with previous medical history of dementia, Parkinson's, distant history of hypertension  Patient presents to the emergency department with sharp chest pain located in her sternum with radiation to her back  Pain woke her from sleep  The pain lasted for approximately 30 min and dissipated on its own  Patient denies any associated symptoms such as nausea, diaphoresis, shortness of breath, palpitations, dizziness, weakness, numbness, tingling  Patient no longer has chest pain upon presentation in the emergency department  Patient denies coronary artery disease history, family history of heart attacks, smoking  Patient is hypertensive in the emergency department, and used to have hypertension but this is a remote history and she is not currently on medications for hypertension  Chest Pain   Pain location:  Substernal area  Pain quality: sharp    Pain radiates to:  Mid back  Pain radiates to the back: yes    Pain severity:  Moderate  Onset quality:  Sudden  Timing:  Rare  Progression:  Resolved  Chronicity:  New  Relieved by:  None tried  Worsened by:  Nothing tried  Ineffective treatments:  None tried  Associated symptoms: no abdominal pain, no cough, no dizziness, no fever, no nausea, no numbness, no palpitations, no shortness of breath, not vomiting and no weakness    Risk factors: no coronary artery disease        Prior to Admission Medications   Prescriptions Last Dose Informant Patient Reported? Taking?    Acetaminophen 325 MG CAPS  Spouse/Significant Other Yes Yes   Sig: Take by mouth   Calcium Carbonate-Vitamin D (OYST-KANDI D PO)  Spouse/Significant Other Yes Yes   Sig: Take 1 tablet by mouth daily 500mg OsCal/Calcium    Cholecalciferol (VITAMIN D PO)  Spouse/Significant Other Yes Yes   Sig: Take 1 tablet by mouth daily     Cyanocobalamin (VITAMIN B-12 PO)  Spouse/Significant Other Yes Yes   Sig: Take by mouth  Doxylamine Succinate, Sleep, (UNISOM PO)   Yes Yes   Sig: Take 1 capsule by mouth daily at bedtime   Melatonin-Pyridoxine (MELATIN PO)   Yes Yes   Sig: Take 1 tablet by mouth daily at bedtime   Multiple Vitamins-Minerals (CENTRUM SILVER ADULT 50+ PO)  Spouse/Significant Other Yes Yes   Sig: Take by mouth daily  carbidopa-levodopa-entacapone (STALEVO) -200 MG per tablet   No Yes   Sig: Take 1tab 5 times a day (every 4 hours)   dextrose 50 %   No Yes   Sig: Infuse 25 mL into a venous catheter as needed for low blood sugar (for blood glucose <60) for up to 30 days   glucosamine 500 MG CAPS capsule  Spouse/Significant Other Yes Yes   Sig: Take 1,000 mg by mouth daily     mirtazapine (REMERON) 15 mg tablet   No Yes   Sig: Take 1 tablet by mouth daily at bedtime for 30 days   solifenacin (VESICARE) 5 mg tablet  Spouse/Significant Other Yes No   Sig: Take 5 mg by mouth daily after dinner      Facility-Administered Medications: None       Past Medical History:   Diagnosis Date    Anxiety     Dementia     Depression     Gastric ulcer     H/O:  section     HLD (hyperlipidemia) 2016    Hypercholesteremia     [facility]'s disease ([facility] [address] )     Raynaud's disease        Past Surgical History:   Procedure Laterality Date     SECTION      CHOLECYSTECTOMY      ESOPHAGOGASTRODUODENOSCOPY      KNEE ARTHROSCOPY Left     MA COLONOSCOPY FLX DX W/COLLJ SPEC WHEN PFRMD N/A 3/18/2016    Procedure: COLONOSCOPY;  Surgeon: Brett Ta MD;  [facility]; Service: Gastroenterology    MA ESOPHAGOGASTRODUODENOSCOPY TRANSORAL DIAGNOSTIC N/A 2016    Procedure: ESOPHAGOGASTRODUODENOSCOPY (EGD); Surgeon: Brett Ta MD;  [facility];   Service: Gastroenterology    MA OPEN RX FEMUR FX+INTRAMED CHARLEY Right 2/23/2017    Procedure: INSERTION NAIL IM FEMUR ANTEGRADE (TROCHANTERIC); Surgeon: Bienvenido Varma MD;  [facility];  Service: Orthopedics       History reviewed  No pertinent family history  I have reviewed and agree with the history as documented  Social History   Substance Use Topics    Smoking status: Never Smoker    Smokeless tobacco: Never Used    Alcohol use 0 6 oz/week     1 Glasses of wine per week      Comment: social        Review of Systems   Constitutional: Negative for chills and fever  HENT: Negative for ear pain, rhinorrhea and sore throat  Eyes: Negative for photophobia and pain  Respiratory: Negative for cough, chest tightness and shortness of breath  Cardiovascular: Positive for chest pain  Negative for palpitations and leg swelling  Gastrointestinal: Negative for abdominal pain, blood in stool, constipation, diarrhea, nausea and vomiting  Endocrine: Negative for polyuria  Genitourinary: Negative for dysuria, frequency, hematuria and urgency  Musculoskeletal: Negative for arthralgias  Skin: Negative for rash  Neurological: Negative for dizziness, weakness and numbness  Psychiatric/Behavioral: The patient is not nervous/anxious  All other systems reviewed and are negative        Physical Exam  ED Triage Vitals   Temperature Pulse Respirations Blood Pressure SpO2   12/21/18 0348 12/21/18 0348 12/21/18 0348 12/21/18 0401 12/21/18 0402   97 8 °F (36 6 °C) 65 18 (!) 194/83 98 %      Temp Source Heart Rate Source Patient Position - Orthostatic VS BP Location FiO2 (%)   12/21/18 0348 12/21/18 0348 12/21/18 0430 12/21/18 0348 --   Oral Monitor Lying Left arm       Pain Score       12/21/18 0348       No Pain           Orthostatic Vital Signs  Vitals:    12/21/18 0348 12/21/18 0401 12/21/18 0430   BP:  (!) 194/83 (!) 198/78   Pulse: 65  60   Patient Position - Orthostatic VS:   Lying       Physical Exam   Constitutional: She appears well-developed and well-nourished  No distress  HENT:   Head: Normocephalic and atraumatic  Right Ear: External ear normal    Left Ear: External ear normal    Eyes: Conjunctivae and EOM are normal    Neck: No JVD present  No tracheal deviation present  Cardiovascular: Normal rate, regular rhythm, normal heart sounds and intact distal pulses  No murmur heard  Pulses in the upper extremity are not delayed bilaterally  Pulmonary/Chest: Breath sounds normal  No stridor  No respiratory distress  She has no wheezes  She has no rales  Abdominal: Soft  Bowel sounds are normal  She exhibits no distension and no mass  There is no tenderness  There is no rebound and no guarding  No pulsatile masses palpable in her abdomen  Genitourinary:   Genitourinary Comments: Deferred   Musculoskeletal: She exhibits no edema, tenderness or deformity  Neurological: She exhibits normal muscle tone  Coordination (Patient has a right upper extremity tremor that is paroxysmal ) abnormal    Skin: Skin is warm and dry  Capillary refill takes less than 2 seconds  No rash noted  She is not diaphoretic  No erythema  No pallor  Psychiatric: She has a normal mood and affect  Her behavior is normal  Judgment and thought content normal    Nursing note and vitals reviewed        ED Medications  Medications - No data to display    Diagnostic Studies  Results Reviewed     Procedure Component Value Units Date/Time    Comprehensive metabolic panel [[id number]]  (Abnormal) Collected:  12/21/18 0358    Lab Status:  Final result Specimen:  Blood from Arm, Right Updated:  12/21/18 0441     Sodium 137 mmol/L      Potassium 4 5 mmol/L      Chloride 103 mmol/L      CO2 29 mmol/L      ANION GAP 5 mmol/L      BUN 32 (H) mg/dL      Creatinine 0 84 mg/dL      Glucose 85 mg/dL      Calcium 9 4 mg/dL      AST 25 U/L      ALT 13 U/L      Alkaline Phosphatase 132 (H) U/L      Total Protein 7 4 g/dL      Albumin 3 6 g/dL      Total Bilirubin 0 54 mg/dL      eGFR 62 ml/min/1 73sq m     Narrative:         National Kidney Disease Education Program recommendations are as follows:  GFR calculation is accurate only with a steady state creatinine  Chronic Kidney disease less than 60 ml/min/1 73 sq  meters  Kidney failure less than 15 ml/min/1 73 sq  meters      Troponin I [[id number]]  (Normal) Collected:  12/21/18 0358    Lab Status:  Final result Specimen:  Blood from Arm, Right Updated:  12/21/18 0430     Troponin I <0 02 ng/mL     CBC and differential [[id number]] Collected:  12/21/18 0358    Lab Status:  Final result Specimen:  Blood from Arm, Right Updated:  12/21/18 0414     WBC 6 62 Thousand/uL      RBC 4 43 Million/uL      Hemoglobin 14 1 g/dL      Hematocrit 43 6 %      MCV 98 fL      MCH 31 8 pg      MCHC 32 3 g/dL      RDW 13 5 %      MPV 11 2 fL      Platelets 317 Thousands/uL      nRBC 0 /100 WBCs      Neutrophils Relative 55 %      Immat GRANS % 0 %      Lymphocytes Relative 35 %      Monocytes Relative 7 %      Eosinophils Relative 2 %      Basophils Relative 1 %      Neutrophils Absolute 3 60 Thousands/µL      Immature Grans Absolute 0 02 Thousand/uL      Lymphocytes Absolute 2 34 Thousands/µL      Monocytes Absolute 0 48 Thousand/µL      Eosinophils Absolute 0 14 Thousand/µL      Basophils Absolute 0 04 Thousands/µL                  XR chest portable   ED Interpretation by Beny Lemon DO (12/21 0451)   No acute findings       by Parul Jacob (12/21 0456)            Procedures  ECG 12 Lead Documentation  Date/Time: 12/21/2018 4:23 AM  Performed by: Jennyfer Ugarte  Authorized by: Jennyfer Ugarte     Indications / Diagnosis:  CP  ECG reviewed by me, the ED Provider: yes    Patient location:  ED  Previous ECG:     Previous ECG:  Compared to current    Comparison ECG info:  05-feb-2018    Similarity:  No change    Comparison to cardiac monitor: Yes    Interpretation:     Interpretation: abnormal    Rate:     ECG rate:  61    ECG rate assessment: normal    Rhythm:     Rhythm: sinus rhythm    Ectopy:     Ectopy: none    QRS:     QRS axis:  Normal    QRS intervals: Wide  Conduction:     Conduction: abnormal      Abnormal conduction: complete RBBB    ST segments:     ST segments:  Normal  T waves:     T waves: normal            Phone Consults  ED Phone Contact    ED Course         HEART Risk Score      Most Recent Value   History  1 Filed at: 12/21/2018 0511   ECG  0 Filed at: 12/21/2018 0511   Age  2 Filed at: 12/21/2018 0511   Risk Factors  1 Filed at: 12/21/2018 0511   Troponin  0 Filed at: 12/21/2018 0511   Heart Score Risk Calculator   History  1 Filed at: 12/21/2018 0511   ECG  0 Filed at: 12/21/2018 0511   Age  2 Filed at: 12/21/2018 0511   Risk Factors  1 Filed at: 12/21/2018 0511   Troponin  0 Filed at: 12/21/2018 0511   HEART Score  4 Filed at: 12/21/2018 0511   HEART Score  4 Filed at: 12/21/2018 4662        Identification of Seniors at Risk      Most Recent Value   (ISAR) Identification of Seniors at Risk   Before the illness or injury that brought you to the Emergency, did you need someone to help you on a regular basis? 1 Filed at: 12/21/2018 0351   In the last 24 hours, have you needed more help than usual?  0 Filed at: 12/21/2018 5929   Have you been hospitalized for one or more nights during the past 6 months? 0 Filed at: 12/21/2018 0351   In general, do you see well?  0 Filed at: 12/21/2018 0351   In general, do you have serious problems with your memory? 1 Filed at: 12/21/2018 1249   Do you take more than three different medications every day? 1 Filed at: 12/21/2018 0351   ISAR Score  3 Filed at: 12/21/2018 0351                          MDM  Number of Diagnoses or Management Options  Chest pain: new and requires workup  Elevated blood pressure reading: new and requires workup  Diagnosis management comments:  An 80-year-old female presenting emergency department with sharp chest pain for 30 min that woke her from sleep and radiated to her back  The pain has dissipated by the time she reached the emergency department  Patient does not smoke, has a distant history of hypertension, and does not have a family history of ACS  Patient will be evaluated for ACS with chest x-ray, troponin, ECG  Patient no longer has the pain and I feel this is less likely to be aortic dissection at this point  Workup is negative  Patient has heart score of 4  Patient will be admitted for chest pain rule out to SOD  Amount and/or Complexity of Data Reviewed  Clinical lab tests: ordered and reviewed  Decide to obtain previous medical records or to obtain history from someone other than the patient: yes  Obtain history from someone other than the patient: yes  Review and summarize past medical records: yes  Independent visualization of images, tracings, or specimens: yes      CritCare Time    Disposition  Final diagnoses:   Elevated blood pressure reading   Chest pain     Time reflects when diagnosis was documented in both MDM as applicable and the Disposition within this note     Time User Action Codes Description Comment    12/21/2018  5:25 AM Kevin Velasquez Add [R03 0] Elevated blood pressure reading     12/21/2018  5:25 AM Edna Zarco Add [R07 9] Chest pain     12/21/2018  5:25 AM Kevin Velasquez Modify [R03 0] Elevated blood pressure reading     12/21/2018  5:25 AM Edna Zarco Modify [R07 9] Chest pain       ED Disposition     ED Disposition Condition Comment    Admit  Case was discussed with Dr Tj Ocampo and the patient's admission status was agreed to be Admission Status: observation status to the service of Dr Lelia Gordillo   Follow-up Information    None         Patient's Medications   Discharge Prescriptions    No medications on file     No discharge procedures on file  ED Provider  Attending physically available and evaluated Liliam Roque I managed the patient along with the ED Attending      Electronically Signed by         Jus Irving,   12/21/18 0327

## 2018-12-21 NOTE — NURSING NOTE
Reviewed discharge instructions, medications and follow up visit with the patient's son and care taker  All questions answered

## 2019-03-01 DIAGNOSIS — G20 PARKINSON DISEASE (HCC): ICD-10-CM

## 2019-03-04 RX ORDER — CARBIDOPA, LEVODOPA AND ENTACAPONE 50; 200; 200 MG/1; MG/1; MG/1
TABLET, FILM COATED ORAL
Qty: 450 TABLET | Refills: 3 | Status: SHIPPED | OUTPATIENT
Start: 2019-03-04

## 2019-05-01 ENCOUNTER — OFFICE VISIT (OUTPATIENT)
Dept: NEUROLOGY | Facility: CLINIC | Age: 84
End: 2019-05-01
Payer: MEDICARE

## 2019-05-01 VITALS — SYSTOLIC BLOOD PRESSURE: 142 MMHG | BODY MASS INDEX: 20.6 KG/M2 | DIASTOLIC BLOOD PRESSURE: 73 MMHG | HEIGHT: 62 IN

## 2019-05-01 DIAGNOSIS — G20 DEMENTIA DUE TO PARKINSON'S DISEASE WITHOUT BEHAVIORAL DISTURBANCE (HCC): ICD-10-CM

## 2019-05-01 DIAGNOSIS — G20 PARKINSON DISEASE (HCC): Primary | ICD-10-CM

## 2019-05-01 DIAGNOSIS — F02.80 DEMENTIA DUE TO PARKINSON'S DISEASE WITHOUT BEHAVIORAL DISTURBANCE (HCC): ICD-10-CM

## 2019-05-01 PROCEDURE — 99215 OFFICE O/P EST HI 40 MIN: CPT | Performed by: PSYCHIATRY & NEUROLOGY

## 2019-05-01 RX ORDER — CARBIDOPA AND LEVODOPA 25; 100 MG/1; MG/1
TABLET, ORALLY DISINTEGRATING ORAL
Qty: 60 TABLET | Refills: 4 | Status: SHIPPED | OUTPATIENT
Start: 2019-05-01 | End: 2019-08-16 | Stop reason: SDUPTHER

## 2019-08-16 DIAGNOSIS — G20 PARKINSON DISEASE (HCC): ICD-10-CM

## 2019-08-16 RX ORDER — CARBIDOPA AND LEVODOPA 25; 100 MG/1; MG/1
TABLET, ORALLY DISINTEGRATING ORAL
Qty: 180 TABLET | Refills: 1 | Status: SHIPPED | OUTPATIENT
Start: 2019-08-16

## 2019-08-16 NOTE — TELEPHONE ENCOUNTER
[facility] sent a request for a 90 day supply of the Carbidopa/levodopa  mg  Script was last sent on 5/1/19 for a 30 day supply but has 4 refills on it  Patient was last seen 5/1/19 and is scheduled with José Antonio Pineda on 11/7/19  Please authorize script if appropriate

## 2019-08-17 ENCOUNTER — HOSPITAL ENCOUNTER (EMERGENCY)
Facility: HOSPITAL | Age: 84
Discharge: HOME | End: 2019-08-17
Attending: EMERGENCY MEDICINE | Admitting: EMERGENCY MEDICINE
Payer: MEDICARE

## 2019-08-17 VITALS
OXYGEN SATURATION: 97 % | HEART RATE: 60 BPM | BODY MASS INDEX: 20.56 KG/M2 | DIASTOLIC BLOOD PRESSURE: 76 MMHG | TEMPERATURE: 98.4 F | SYSTOLIC BLOOD PRESSURE: 205 MMHG | RESPIRATION RATE: 20 BRPM | WEIGHT: 112.43 LBS

## 2019-08-17 DIAGNOSIS — I10 HYPERTENSION, UNSPECIFIED TYPE: Primary | ICD-10-CM

## 2019-08-17 LAB
ALBUMIN SERPL BCP-MCNC: 3.6 G/DL (ref 3.5–5)
ALP SERPL-CCNC: 97 U/L (ref 46–116)
ALT SERPL W P-5'-P-CCNC: 16 U/L (ref 12–78)
ANION GAP SERPL CALCULATED.3IONS-SCNC: 6 MMOL/L (ref 4–13)
AST SERPL W P-5'-P-CCNC: 24 U/L (ref 5–45)
ATRIAL RATE: 63 BPM
BILIRUB SERPL-MCNC: 0.7 MG/DL (ref 0.2–1)
BUN SERPL-MCNC: 21 MG/DL (ref 5–25)
CALCIUM SERPL-MCNC: 9.3 MG/DL (ref 8.3–10.1)
CHLORIDE SERPL-SCNC: 104 MMOL/L (ref 100–108)
CO2 SERPL-SCNC: 29 MMOL/L (ref 21–32)
CREAT SERPL-MCNC: 0.68 MG/DL (ref 0.6–1.3)
GFR SERPL CREATININE-BSD FRML MDRD: 78 ML/MIN/1.73SQ M
GLUCOSE SERPL-MCNC: 88 MG/DL (ref 65–140)
P AXIS: 68 DEGREES
POTASSIUM SERPL-SCNC: 4 MMOL/L (ref 3.5–5.3)
PR INTERVAL: 140 MS
PROT SERPL-MCNC: 7.5 G/DL (ref 6.4–8.2)
QRS AXIS: 57 DEGREES
QRSD INTERVAL: 140 MS
QT INTERVAL: 432 MS
QTC INTERVAL: 442 MS
SODIUM SERPL-SCNC: 139 MMOL/L (ref 136–145)
T WAVE AXIS: 39 DEGREES
TROPONIN I SERPL-MCNC: <0.02 NG/ML
VENTRICULAR RATE: 63 BPM

## 2019-08-17 PROCEDURE — 99283 EMERGENCY DEPT VISIT LOW MDM: CPT | Performed by: EMERGENCY MEDICINE

## 2019-08-17 PROCEDURE — 36415 COLL VENOUS BLD VENIPUNCTURE: CPT | Performed by: EMERGENCY MEDICINE

## 2019-08-17 PROCEDURE — 93010 ELECTROCARDIOGRAM REPORT: CPT | Performed by: INTERNAL MEDICINE

## 2019-08-17 PROCEDURE — 99284 EMERGENCY DEPT VISIT MOD MDM: CPT

## 2019-08-17 PROCEDURE — 80053 COMPREHEN METABOLIC PANEL: CPT | Performed by: EMERGENCY MEDICINE

## 2019-08-17 PROCEDURE — 84484 ASSAY OF TROPONIN QUANT: CPT | Performed by: EMERGENCY MEDICINE

## 2019-08-17 PROCEDURE — 93005 ELECTROCARDIOGRAM TRACING: CPT

## 2019-08-17 NOTE — ED ATTENDING ATTESTATION
Alla Recinos MD, saw and evaluated the patient  All available labs and X-rays were ordered by me or the resident and have been reviewed by myself  I discussed the patient with the resident / non-physician and agree with the resident's / non-physician practitioner's findings and plan as documented in the resident's / non-physician practicitioner's note, except where noted  At this point, I agree with the current assessment done in the ED  I was present during key portions of all procedures performed unless otherwise stated  Chief Complaint   Patient presents with    Hypertension     Pt  comes from home where EMS reports family took pt's blood pressure x3 times and it was found to be in the 200s  Patient has a h/o of Parkinsons and is nonverbal at baseline  This is an 79 y/o F w/ P D  (Stage 4), periods of non-verbal with it presenting for evaluation of hypertension  The patient was noted to have BPs in the 200s at home so was brought in for evaluation  Last week she had low BPs  This week she has HTN  ROS couldn't be obtained as patient is non-verbal but can nod  She does affirm she feels like herself  She will say, "no no" or "yes" quietly too  The caretaker in the room says that the patient is like this for quite some time but the son will be en route  PMH: Anxiety dementia depression hld P D  Gastric ulcer Raynaud's hypercholesterolemia  PSH knee arthroscopy  eneida egd femur fx EGD  No smoking drinking drugs  Patient lives by herself but has 24 hour care  PE:  Vitals:    19 0937 19 1023 19 1058 19 1200   BP:  (!) 208/93 169/96 (!) 205/76   BP Location:  Right arm  Right arm   Pulse:  66 67 60   Resp:  18 20 20   Temp: 98 4 °F (36 9 °C)      TempSrc: Oral      SpO2:  94% 99% 97%   Weight:       General: VS reviewed  NAD, awake, alert  Well-nourished, well-developed  Appears stated age     Nods to questions  Head: Normocephalic, atraumatic, nontender  Eyes: PERRL, EOM-I  No diplopia  No hyphema  No subconjunctival hemorrhages  Symmetrical lids  ENT: Atraumatic external nose and ears  MMM  No malocclusion  No stridor  Normal phonation  No drooling  Normal swallowing  Neck: Symmetric, trachea midline  No JVD  CV: RRR  +S1/S2  No murmurs or gallops  Peripheral pulses +2 throughout  No chest wall tenderness  Lungs:   Unlabored No retractions  CTAB, lungs sounds equal bilateral    No tachypnea  Abd: +BS, soft, NT/ND    MSK:   FROM   Back:   No rashes  Skin: Dry, intact  Neuro: Awake   GCS 15  Moving arms spontaneously  CN II-XII grossly intact  Psychiatric/Behavioral: Appropriate mood and affect   Exam: deferred  A:  - HTN, symp vs asymp  P:  - Given difficulty with ROS, will check LFT + RFTs; will do EKG for strain  - If negative, DC, f/u PCP  - Will do CTH for acute changes unless son when he comes corroborates that the patient is at baseline   - Likely asymptomatic HTN with labile pressures in setting of P D    - 13 point ROS was performed and all are normal unless stated in the history above  - Nursing note reviewed  Vitals reviewed  - Orders placed by myself and/or advanced practitioner / resident     - Previous chart was reviewed  - No language barrier    - History obtained from patient family  - There are limitations to the history obtained  Reasons ROS could not be obtained: Parkinson's disease   - Critical care time: Not applicable for this patient  If labs normal, patient likely has asymptomatic hypertension, and we will discharge this patient home for close follow-up with their PCP  This is supported by 1 [facility] 9233: Jelani Meredith MD, Oh DEJESUS,   Clinical policy: critical issues in the evaluation and management of adult patients in the emergency department with asymptomatic elevated blood pressure  Annals of emergency medicine  2013; 62(1):59-68   [pubmed]    As well as ACC/AHA Guidelines for HTN in adults 2017:    Vikas PK, Mini RM, Melodie WS, et al  2017 ACC/AHA/AAPA/ABC/ACPM/AGS/APhA/DIEUDONNE/ASPC/NMA/PCNA Guideline for the Prevention, Detection, Evaluation, and Management of High Blood Pressure in Adults: A Report of the [employer] of Cardiology/American Heart Association Task Force on Clinical Practice Guidelines  Circulation  2018; 138(17):b355-s030  [pubmed]    Figure from section 11 2 ACC/AHA guidelines below:        Final Diagnosis:  1  Hypertension, unspecified type        ED Course as of Aug 17 1330   Sat Aug 17, 2019   1006 Son here; states that the patient is at baseline  Will defer HCT given this information  Medications - No data to display  No orders to display     Orders Placed This Encounter   Procedures    Comprehensive metabolic panel    Troponin I    Nursing communcation Please draw labs  Don't need IV, can just do butterfly    EKG RESULTS    ECG 12 lead    ECG 12 lead     Labs Reviewed   TROPONIN I - Normal       Result Value Ref Range Status    Troponin I <0 02  <=0 04 ng/mL Final    Comment:   [employer] Chemistry analyzer 99% cutoff is > 0 04 ng/mL in network labs     o cTnI 99% cutoff is useful only when applied to patients in the clinical setting of myocardial ischemia   o cTnI 99% cutoff should be interpreted in the context of clinical history, ECG findings and possibly cardiac imaging to establish correct diagnosis  o cTnI 99% cutoff may be suggestive but clearly not indicative of a coronary event without the clinical setting of myocardial ischemia  COMPREHENSIVE METABOLIC PANEL    Sodium 778  136 - 145 mmol/L Final    Potassium 4 0  3 5 - 5 3 mmol/L Final    Comment: Slightly Hemolyzed;  Results May be Affected    Chloride 104  100 - 108 mmol/L Final    CO2 29  21 - 32 mmol/L Final    ANION GAP 6  4 - 13 mmol/L Final    BUN 21  5 - 25 mg/dL Final    Creatinine 0 68  0 60 - 1 30 mg/dL Final    Comment: Standardized to IDMS reference method Glucose 88  65 - 140 mg/dL Final    Comment:   If the patient is fasting, the ADA then defines impaired fasting glucose as > 100 mg/dL and diabetes as > or equal to 123 mg/dL  Specimen collection should occur prior to Sulfasalazine administration due to the potential for falsely depressed results  Specimen collection should occur prior to Sulfapyridine administration due to the potential for falsely elevated results  Calcium 9 3  8 3 - 10 1 mg/dL Final    AST 24  5 - 45 U/L Final    Comment: Slightly Hemolyzed; Results May be Affected  Specimen collection should occur prior to Sulfasalazine administration due to the potential for falsely depressed results  ALT 16  12 - 78 U/L Final    Comment:   Specimen collection should occur prior to Sulfasalazine and/or Sulfapyridine administration due to the potential for falsely depressed results       Alkaline Phosphatase 97  46 - 116 U/L Final    Total Protein 7 5  6 4 - 8 2 g/dL Final    Albumin 3 6  3 5 - 5 0 g/dL Final    Total Bilirubin 0 70  0 20 - 1 00 mg/dL Final    eGFR 78  ml/min/1 73sq m Final    Narrative:     [place] guidelines for Chronic Kidney Disease (CKD):     Stage 1 with normal or high GFR (GFR > 90 mL/min/1 73 square meters)    Stage 2 Mild CKD (GFR = 60-89 mL/min/1 73 square meters)    Stage 3A Moderate CKD (GFR = 45-59 mL/min/1 73 square meters)    Stage 3B Moderate CKD (GFR = 30-44 mL/min/1 73 square meters)    Stage 4 Severe CKD (GFR = 15-29 mL/min/1 73 square meters)    Stage 5 End Stage CKD (GFR <15 mL/min/1 73 square meters)  Note: GFR calculation is accurate only with a steady state creatinine     Time reflects when diagnosis was documented in both MDM as applicable and the Disposition within this note     Time User Action Codes Description Comment    8/17/2019  9:54 AM Riri Luu Add [I10] Hypertension, unspecified type       ED Disposition     ED Disposition Condition Date/Time Comment Discharge Stable Sat Aug 17, 2019 11:45 AM Peter Boles discharge to home/self care  Return precautions given and questions answered  Follow-up Information     Follow up With Specialties Details Why Contact Info    Esther Jain MD Internal Medicine  As needed [address] [place] [address]  [phone]          Discharge Medication List as of 8/17/2019 11:45 AM      CONTINUE these medications which have NOT CHANGED    Details   Acetaminophen 325 MG CAPS Take by mouth, Historical Med      Calcium Carbonate-Vitamin D (OYST-KANDI D PO) Take 1 tablet by mouth daily 500mg OsCal/Calcium , Historical Med      carbidopa-levodopa (PARCOPA)  mg per disintegrating tablet 1- 2 times prn off periods, Normal      carbidopa-levodopa-entacapone (STALEVO) -200 MG per tablet Take 1tab 5 times a day (every 4 hours), Normal      Cholecalciferol (VITAMIN D PO) Take 1 tablet by mouth daily  , Historical Med      Cyanocobalamin (VITAMIN B-12 PO) Take by mouth , Historical Med      Doxylamine Succinate, Sleep, (UNISOM PO) Take 1 capsule by mouth daily at bedtime, Historical Med      glucosamine 500 MG CAPS capsule Take 1,000 mg by mouth daily  , Historical Med      Melatonin-Pyridoxine (MELATIN PO) Take 1 tablet by mouth daily at bedtime, Historical Med      mirtazapine (REMERON) 15 mg tablet Take 1 tablet by mouth daily at bedtime for 30 days, Starting Mon 3/6/2017, Until Wed 5/1/2019, Print      Multiple Vitamins-Minerals (CENTRUM SILVER ADULT 50+ PO) Take by mouth daily  , Historical Med      solifenacin (VESICARE) 5 mg tablet Take 5 mg by mouth daily after dinner, Historical Med           No discharge procedures on file  Prior to Admission Medications   Prescriptions Last Dose Informant Patient Reported? Taking?    Acetaminophen 325 MG CAPS  Spouse/Significant Other Yes No   Sig: Take by mouth   Calcium Carbonate-Vitamin D (OYST-KANDI D PO)  Spouse/Significant Other Yes No   Sig: Take 1 tablet by mouth daily 500mg OsCal/Calcium    Cholecalciferol (VITAMIN D PO)  Spouse/Significant Other Yes No   Sig: Take 1 tablet by mouth daily     Cyanocobalamin (VITAMIN B-12 PO)  Spouse/Significant Other Yes No   Sig: Take by mouth  Doxylamine Succinate, Sleep, (UNISOM PO)   Yes No   Sig: Take 1 capsule by mouth daily at bedtime   Melatonin-Pyridoxine (MELATIN PO)   Yes No   Sig: Take 1 tablet by mouth daily at bedtime   Multiple Vitamins-Minerals (CENTRUM SILVER ADULT 50+ PO)  Spouse/Significant Other Yes No   Sig: Take by mouth daily  carbidopa-levodopa (PARCOPA)  mg per disintegrating tablet   No No   Si- 2 times prn off periods   carbidopa-levodopa-entacapone (STALEVO) -200 MG per tablet   No No   Sig: Take 1tab 5 times a day (every 4 hours)   glucosamine 500 MG CAPS capsule  Spouse/Significant Other Yes No   Sig: Take 1,000 mg by mouth daily     mirtazapine (REMERON) 15 mg tablet   No No   Sig: Take 1 tablet by mouth daily at bedtime for 30 days   solifenacin (VESICARE) 5 mg tablet  Spouse/Significant Other Yes No   Sig: Take 5 mg by mouth daily after dinner      Facility-Administered Medications: None       Portions of the record may have been created with voice recognition software  Occasional wrong word or "sound a like" substitutions may have occurred due to the inherent limitations of voice recognition software  Read the chart carefully and recognize, using context, where substitutions have occurred      Electronically signed by:  Darian Stephenson

## 2019-08-17 NOTE — ED PROVIDER NOTES
History  Chief Complaint   Patient presents with    Hypertension     Pt  comes from home where EMS reports family took pt's blood pressure x3 times and it was found to be in the 200s  Patient has a h/o of Parkinsons and is nonverbal at baseline  Neel Craven is an 80y o  year old female with PMHx significant for Parkinson disease, who presents to the ED today with high blood pressure  Someone took her blood pressure at home about 3 times per her report, and the systolic blood pressure was above 200 and so called for EMS to bring her to the emergency department  Otherwise the patient is well and acting like her normal self  Per friend she had very low blood pressures last week, but was not symptomatic  Patient is intermittently able to answer yes or no questions  She has not had any vomiting, diarrhea  She denies that she isn't in pain or feels unusual   The patient has no sick contacts, recent travel history, new or changing medications, or immunocompromise  History provided by:  Patient   used: No    Hypertension   Duration:  1 day  Progression:  Improving  Chronicity:  Recurrent  Ineffective treatments:  None tried  Associated symptoms comment:  Unable to determine      Prior to Admission Medications   Prescriptions Last Dose Informant Patient Reported? Taking? Acetaminophen 325 MG CAPS  Spouse/Significant Other Yes No   Sig: Take by mouth   Calcium Carbonate-Vitamin D (OYST-KANDI D PO)  Spouse/Significant Other Yes No   Sig: Take 1 tablet by mouth daily 500mg OsCal/Calcium    Cholecalciferol (VITAMIN D PO)  Spouse/Significant Other Yes No   Sig: Take 1 tablet by mouth daily     Cyanocobalamin (VITAMIN B-12 PO)  Spouse/Significant Other Yes No   Sig: Take by mouth     Doxylamine Succinate, Sleep, (UNISOM PO)   Yes No   Sig: Take 1 capsule by mouth daily at bedtime   Melatonin-Pyridoxine (MELATIN PO)   Yes No   Sig: Take 1 tablet by mouth daily at bedtime   Multiple Vitamins-Minerals (CENTRUM SILVER ADULT 50+ PO)  Spouse/Significant Other Yes No   Sig: Take by mouth daily  carbidopa-levodopa (PARCOPA)  mg per disintegrating tablet   No No   Si- 2 times prn off periods   carbidopa-levodopa-entacapone (STALEVO) -200 MG per tablet   No No   Sig: Take 1tab 5 times a day (every 4 hours)   glucosamine 500 MG CAPS capsule  Spouse/Significant Other Yes No   Sig: Take 1,000 mg by mouth daily     mirtazapine (REMERON) 15 mg tablet   No No   Sig: Take 1 tablet by mouth daily at bedtime for 30 days   solifenacin (VESICARE) 5 mg tablet  Spouse/Significant Other Yes No   Sig: Take 5 mg by mouth daily after dinner      Facility-Administered Medications: None       Past Medical History:   Diagnosis Date    Anxiety     Dementia     Depression     Gastric ulcer     H/O:  section     HLD (hyperlipidemia) 2016    Hypercholesteremia     [facility]'s disease ([facility] [address] )     Raynaud's disease        Past Surgical History:   Procedure Laterality Date     SECTION      CHOLECYSTECTOMY      ESOPHAGOGASTRODUODENOSCOPY      KNEE ARTHROSCOPY Left     WI COLONOSCOPY FLX DX W/COLLJ SPEC WHEN PFRMD N/A 3/18/2016    Procedure: COLONOSCOPY;  Surgeon: Yovana Brenenr MD;  [facility]; Service: Gastroenterology    WI ESOPHAGOGASTRODUODENOSCOPY TRANSORAL DIAGNOSTIC N/A 2016    Procedure: ESOPHAGOGASTRODUODENOSCOPY (EGD); Surgeon: Yovana Brenner MD;  [facility]; Service: Gastroenterology    WI OPEN RX FEMUR FX+INTRAMED CHARLEY Right 2017    Procedure: INSERTION NAIL IM FEMUR ANTEGRADE (TROCHANTERIC); Surgeon: Baltazar Rodriguez MD;  [facility];  Service: Orthopedics       History reviewed  No pertinent family history  I have reviewed and agree with the history as documented  Social History     Tobacco Use    Smoking status: Never Smoker    Smokeless tobacco: Never Used   Substance Use Topics    Alcohol use:  Yes     Alcohol/week: 1 0 standard drinks     Types: 1 Glasses of wine per week     Comment: social    Drug use: No        Review of Systems   Unable to perform ROS: Patient nonverbal       Physical Exam  ED Triage Vitals   Temperature Pulse Respirations Blood Pressure SpO2   08/17/19 0937 08/17/19 0923 08/17/19 0923 08/17/19 0923 08/17/19 0923   98 4 °F (36 9 °C) 70 18 (!) 177/90 95 %      Temp Source Heart Rate Source Patient Position - Orthostatic VS BP Location FiO2 (%)   08/17/19 0937 08/17/19 0923 08/17/19 0923 08/17/19 0923 --   Oral Monitor Sitting Right arm       Pain Score       08/17/19 0923       No Pain             Orthostatic Vital Signs  Vitals:    08/17/19 0923 08/17/19 1023 08/17/19 1058 08/17/19 1200   BP: (!) 177/90 (!) 208/93 169/96 (!) 205/76   Pulse: 70 66 67 60   Patient Position - Orthostatic VS: Sitting Lying  Lying       Physical Exam   Constitutional: She is oriented to person, place, and time  She appears well-developed  No distress  thin   HENT:   Head: Normocephalic and atraumatic  Mouth/Throat: Oropharynx is clear and moist  No oropharyngeal exudate  Eyes: Pupils are equal, round, and reactive to light  Conjunctivae are normal  Right eye exhibits no discharge  Left eye exhibits no discharge  No scleral icterus  Neck: Neck supple  Cardiovascular: Normal rate, regular rhythm and intact distal pulses  Pulmonary/Chest: Effort normal and breath sounds normal  No stridor  No respiratory distress  Abdominal: Soft  She exhibits no distension and no mass  There is no tenderness  There is no guarding  Musculoskeletal: She exhibits no edema, tenderness or deformity  Does not actively move legs for me on exam - per friend this is baseline   Neurological: She is alert and oriented to person, place, and time  Moves arms actively with good strength  Does not move legs on exam, but holds them stiffly extended  EOMI  Skin: Skin is warm and dry  Capillary refill takes less than 2 seconds   No rash noted  She is not diaphoretic  Psychiatric: She has a normal mood and affect  Her behavior is normal    Vitals reviewed  ED Medications  Medications - No data to display    Diagnostic Studies  Results Reviewed     Procedure Component Value Units Date/Time    Comprehensive metabolic panel [[id number]] Collected:  08/17/19 1057    Lab Status:  Final result Specimen:  Blood from Arm, Right Updated:  08/17/19 1132     Sodium 139 mmol/L      Potassium 4 0 mmol/L      Chloride 104 mmol/L      CO2 29 mmol/L      ANION GAP 6 mmol/L      BUN 21 mg/dL      Creatinine 0 68 mg/dL      Glucose 88 mg/dL      Calcium 9 3 mg/dL      AST 24 U/L      ALT 16 U/L      Alkaline Phosphatase 97 U/L      Total Protein 7 5 g/dL      Albumin 3 6 g/dL      Total Bilirubin 0 70 mg/dL      eGFR 78 ml/min/1 73sq m     Narrative:       [place] guidelines for Chronic Kidney Disease (CKD):     Stage 1 with normal or high GFR (GFR > 90 mL/min/1 73 square meters)    Stage 2 Mild CKD (GFR = 60-89 mL/min/1 73 square meters)    Stage 3A Moderate CKD (GFR = 45-59 mL/min/1 73 square meters)    Stage 3B Moderate CKD (GFR = 30-44 mL/min/1 73 square meters)    Stage 4 Severe CKD (GFR = 15-29 mL/min/1 73 square meters)    Stage 5 End Stage CKD (GFR <15 mL/min/1 73 square meters)  Note: GFR calculation is accurate only with a steady state creatinine    Troponin I [[id number]]  (Normal) Collected:  08/17/19 1057    Lab Status:  Final result Specimen:  Blood from Arm, Right Updated:  08/17/19 1126     Troponin I <0 02 ng/mL                  No orders to display         Procedures  Procedures        ED Course  ED Course as of Aug 17 1334   Sat Aug 17, 2019   1002 Procedure Note: EKG  Date/Time: 08/17/19 10:06 AM   Interpreted by: Juli Lindsey DO  Indications / Diagnosis: HTN  ECG reviewed by me, the ED Provider: yes   The EKG demonstrates:  Rhythm: normal sinus  Stable RBB, no blocks  Axis: normal axis  ST Changes:  No acute ST Changes, no STD/SUBHASH                                      MDM  Number of Diagnoses or Management Options  Hypertension, unspecified type: established and worsening  Diagnosis management comments: Patient presenting today with hypertension, but unable to determine if she is symptomatic due to her dementia and that she is nonverbal   Will do a cardiac workup and a CMP to evaluate for evidence of end-organ damage  We will wait to talk to her son when he gets here to determine if her behavior is normal for her right now  If it is not will get a head CT  The patient has no lab abnormalities, we will discharge the patient with return precautions to the care of her family  Will recommend that she see Neurology to discuss her labile blood pressures  Patient's blood pressure is improved here in the emergency department without treatment  Amount and/or Complexity of Data Reviewed  Clinical lab tests: ordered and reviewed  Tests in the medicine section of CPT®: ordered and reviewed  Review and summarize past medical records: yes  Discuss the patient with other providers: yes    Risk of Complications, Morbidity, and/or Mortality  Presenting problems: moderate  Diagnostic procedures: low  Management options: low    Patient Progress  Patient progress: stable      Disposition  Final diagnoses:   Hypertension, unspecified type     Time reflects when diagnosis was documented in both MDM as applicable and the Disposition within this note     Time User Action Codes Description Comment    8/17/2019  9:54 AM Fabien Welch Add [I10] Hypertension, unspecified type       ED Disposition     ED Disposition Condition Date/Time Comment    Discharge Stable Sat Aug 17, 2019 11:45 AM Deshawn Montiel discharge to home/self care  Return precautions given and questions answered                   Follow-up Information     Follow up With Specialties Details Why Contact Info    Aaron Alford MD Internal Medicine  As needed [phone] [address] [address]  [phone]            Discharge Medication List as of 8/17/2019 11:45 AM      CONTINUE these medications which have NOT CHANGED    Details   Acetaminophen 325 MG CAPS Take by mouth, Historical Med      Calcium Carbonate-Vitamin D (OYST-KANDI D PO) Take 1 tablet by mouth daily 500mg OsCal/Calcium , Historical Med      carbidopa-levodopa (PARCOPA)  mg per disintegrating tablet 1- 2 times prn off periods, Normal      carbidopa-levodopa-entacapone (STALEVO) -200 MG per tablet Take 1tab 5 times a day (every 4 hours), Normal      Cholecalciferol (VITAMIN D PO) Take 1 tablet by mouth daily  , Historical Med      Cyanocobalamin (VITAMIN B-12 PO) Take by mouth , Historical Med      Doxylamine Succinate, Sleep, (UNISOM PO) Take 1 capsule by mouth daily at bedtime, Historical Med      glucosamine 500 MG CAPS capsule Take 1,000 mg by mouth daily  , Historical Med      Melatonin-Pyridoxine (MELATIN PO) Take 1 tablet by mouth daily at bedtime, Historical Med      mirtazapine (REMERON) 15 mg tablet Take 1 tablet by mouth daily at bedtime for 30 days, Starting Mon 3/6/2017, Until Wed 5/1/2019, Print      Multiple Vitamins-Minerals (CENTRUM SILVER ADULT 50+ PO) Take by mouth daily  , Historical Med      solifenacin (VESICARE) 5 mg tablet Take 5 mg by mouth daily after dinner, Historical Med           No discharge procedures on file  ED Provider  Attending physically available and evaluated Vlad Shirley I managed the patient along with the ED Attending      Electronically Signed by         Anamaria Osborne DO  08/17/19 4122

## 2019-08-17 NOTE — ED NOTES
Patients son will be arriving in approx 30 minutes to transport patient home        Raulito Alvarez RN  08/17/19 0275

## 2019-08-17 NOTE — DISCHARGE INSTRUCTIONS
You were seen today in the Emergency Department for high blood pressure without symptoms  Your workup included blood tests and revealed no abnormalities  Please follow up with your neurologist in the next 2-3 days to discuss your blood pressure, as I believe this may be related to [address]  Please return to the Emergency Department if you have fevers, chills, chest pain, difficulty breathing, changes in behavior, weakness, or any burning symptoms  I have attached an educational handout about blood pressure  Please look this over and let your nurse and/or me know if you have any further questions before you leave

## 2019-08-27 ENCOUNTER — HOSPITAL ENCOUNTER (INPATIENT)
Facility: HOSPITAL | Age: 84
LOS: 2 days | Discharge: HOME HEALTH | DRG: 689 | End: 2019-08-30
Attending: EMERGENCY MEDICINE | Admitting: INTERNAL MEDICINE
Payer: MEDICARE

## 2019-08-27 ENCOUNTER — APPOINTMENT (EMERGENCY)
Dept: RADIOLOGY | Facility: HOSPITAL | Age: 84
DRG: 689 | End: 2019-08-27
Payer: MEDICARE

## 2019-08-27 DIAGNOSIS — Z79.899 POLYPHARMACY: ICD-10-CM

## 2019-08-27 DIAGNOSIS — R41.82 ALTERED MENTAL STATUS: ICD-10-CM

## 2019-08-27 DIAGNOSIS — G20 PARKINSON DISEASE, SYMPTOMATIC (HCC): Primary | ICD-10-CM

## 2019-08-27 DIAGNOSIS — N39.0 UTI (URINARY TRACT INFECTION): ICD-10-CM

## 2019-08-27 DIAGNOSIS — N39.0 URINARY TRACT INFECTION: ICD-10-CM

## 2019-08-27 DIAGNOSIS — S42.031K CLOSED DISPLACED FRACTURE OF ACROMIAL END OF RIGHT CLAVICLE WITH NONUNION, SUBSEQUENT ENCOUNTER: ICD-10-CM

## 2019-08-27 PROBLEM — N30.00 ACUTE CYSTITIS WITHOUT HEMATURIA: Status: ACTIVE | Noted: 2019-08-27

## 2019-08-27 LAB
ALBUMIN SERPL BCP-MCNC: 3.6 G/DL (ref 3.5–5)
ALP SERPL-CCNC: 88 U/L (ref 46–116)
ALT SERPL W P-5'-P-CCNC: 34 U/L (ref 12–78)
ANION GAP SERPL CALCULATED.3IONS-SCNC: 7 MMOL/L (ref 4–13)
APTT PPP: 27 SECONDS (ref 23–37)
AST SERPL W P-5'-P-CCNC: 39 U/L (ref 5–45)
ATRIAL RATE: 61 BPM
BACTERIA UR QL AUTO: ABNORMAL /HPF
BASOPHILS # BLD AUTO: 0.04 THOUSANDS/ΜL (ref 0–0.1)
BASOPHILS NFR BLD AUTO: 1 % (ref 0–1)
BILIRUB SERPL-MCNC: 0.65 MG/DL (ref 0.2–1)
BILIRUB UR QL STRIP: NEGATIVE
BUN SERPL-MCNC: 24 MG/DL (ref 5–25)
CALCIUM SERPL-MCNC: 9.4 MG/DL (ref 8.3–10.1)
CHLORIDE SERPL-SCNC: 102 MMOL/L (ref 100–108)
CK SERPL-CCNC: 98 U/L (ref 26–192)
CLARITY UR: CLEAR
CO2 SERPL-SCNC: 30 MMOL/L (ref 21–32)
COLOR UR: ABNORMAL
COLOR, POC: NORMAL
CREAT SERPL-MCNC: 0.82 MG/DL (ref 0.6–1.3)
EOSINOPHIL # BLD AUTO: 0.09 THOUSAND/ΜL (ref 0–0.61)
EOSINOPHIL NFR BLD AUTO: 2 % (ref 0–6)
ERYTHROCYTE [DISTWIDTH] IN BLOOD BY AUTOMATED COUNT: 14.1 % (ref 11.6–15.1)
GFR SERPL CREATININE-BSD FRML MDRD: 64 ML/MIN/1.73SQ M
GLUCOSE SERPL-MCNC: 127 MG/DL (ref 65–140)
GLUCOSE UR STRIP-MCNC: NEGATIVE MG/DL
HCT VFR BLD AUTO: 44.7 % (ref 34.8–46.1)
HGB BLD-MCNC: 14.6 G/DL (ref 11.5–15.4)
HGB UR QL STRIP.AUTO: ABNORMAL
IMM GRANULOCYTES # BLD AUTO: 0.01 THOUSAND/UL (ref 0–0.2)
IMM GRANULOCYTES NFR BLD AUTO: 0 % (ref 0–2)
INR PPP: 1.03 (ref 0.84–1.19)
KETONES UR STRIP-MCNC: ABNORMAL MG/DL
LEUKOCYTE ESTERASE UR QL STRIP: ABNORMAL
LIPASE SERPL-CCNC: 53 U/L (ref 73–393)
LYMPHOCYTES # BLD AUTO: 1.61 THOUSANDS/ΜL (ref 0.6–4.47)
LYMPHOCYTES NFR BLD AUTO: 26 % (ref 14–44)
MCH RBC QN AUTO: 31.5 PG (ref 26.8–34.3)
MCHC RBC AUTO-ENTMCNC: 32.7 G/DL (ref 31.4–37.4)
MCV RBC AUTO: 96 FL (ref 82–98)
MONOCYTES # BLD AUTO: 0.39 THOUSAND/ΜL (ref 0.17–1.22)
MONOCYTES NFR BLD AUTO: 6 % (ref 4–12)
NEUTROPHILS # BLD AUTO: 3.96 THOUSANDS/ΜL (ref 1.85–7.62)
NEUTS SEG NFR BLD AUTO: 65 % (ref 43–75)
NITRITE UR QL STRIP: POSITIVE
NON-SQ EPI CELLS URNS QL MICRO: ABNORMAL /HPF
NRBC BLD AUTO-RTO: 0 /100 WBCS
P AXIS: 70 DEGREES
PH UR STRIP.AUTO: 7 [PH] (ref 4.5–8)
PLATELET # BLD AUTO: 235 THOUSANDS/UL (ref 149–390)
PMV BLD AUTO: 11.2 FL (ref 8.9–12.7)
POTASSIUM SERPL-SCNC: 4.9 MMOL/L (ref 3.5–5.3)
PR INTERVAL: 152 MS
PROT SERPL-MCNC: 7.4 G/DL (ref 6.4–8.2)
PROT UR STRIP-MCNC: NEGATIVE MG/DL
PROTHROMBIN TIME: 13.1 SECONDS (ref 11.6–14.5)
QRS AXIS: 61 DEGREES
QRSD INTERVAL: 134 MS
QT INTERVAL: 446 MS
QTC INTERVAL: 448 MS
RBC # BLD AUTO: 4.64 MILLION/UL (ref 3.81–5.12)
RBC #/AREA URNS AUTO: ABNORMAL /HPF
SODIUM SERPL-SCNC: 139 MMOL/L (ref 136–145)
SP GR UR STRIP.AUTO: 1.01 (ref 1–1.03)
T WAVE AXIS: 42 DEGREES
TROPONIN I SERPL-MCNC: <0.02 NG/ML
TSH SERPL DL<=0.05 MIU/L-ACNC: 2.5 UIU/ML (ref 0.36–3.74)
URINE COMMENT: ABNORMAL
UROBILINOGEN UR QL STRIP.AUTO: 0.2 E.U./DL
VENTRICULAR RATE: 61 BPM
WBC # BLD AUTO: 6.1 THOUSAND/UL (ref 4.31–10.16)
WBC #/AREA URNS AUTO: ABNORMAL /HPF

## 2019-08-27 PROCEDURE — 99220 PR INITIAL OBSERVATION CARE/DAY 70 MINUTES: CPT | Performed by: INTERNAL MEDICINE

## 2019-08-27 PROCEDURE — 93005 ELECTROCARDIOGRAM TRACING: CPT

## 2019-08-27 PROCEDURE — 82550 ASSAY OF CK (CPK): CPT | Performed by: INTERNAL MEDICINE

## 2019-08-27 PROCEDURE — 85025 COMPLETE CBC W/AUTO DIFF WBC: CPT | Performed by: STUDENT IN AN ORGANIZED HEALTH CARE EDUCATION/TRAINING PROGRAM

## 2019-08-27 PROCEDURE — G8996 SWALLOW CURRENT STATUS: HCPCS

## 2019-08-27 PROCEDURE — 80053 COMPREHEN METABOLIC PANEL: CPT | Performed by: STUDENT IN AN ORGANIZED HEALTH CARE EDUCATION/TRAINING PROGRAM

## 2019-08-27 PROCEDURE — 36415 COLL VENOUS BLD VENIPUNCTURE: CPT | Performed by: STUDENT IN AN ORGANIZED HEALTH CARE EDUCATION/TRAINING PROGRAM

## 2019-08-27 PROCEDURE — 73060 X-RAY EXAM OF HUMERUS: CPT

## 2019-08-27 PROCEDURE — 85730 THROMBOPLASTIN TIME PARTIAL: CPT | Performed by: STUDENT IN AN ORGANIZED HEALTH CARE EDUCATION/TRAINING PROGRAM

## 2019-08-27 PROCEDURE — 87086 URINE CULTURE/COLONY COUNT: CPT | Performed by: NURSE PRACTITIONER

## 2019-08-27 PROCEDURE — 92610 EVALUATE SWALLOWING FUNCTION: CPT

## 2019-08-27 PROCEDURE — 99285 EMERGENCY DEPT VISIT HI MDM: CPT

## 2019-08-27 PROCEDURE — 1123F ACP DISCUSS/DSCN MKR DOCD: CPT | Performed by: INTERNAL MEDICINE

## 2019-08-27 PROCEDURE — 83690 ASSAY OF LIPASE: CPT | Performed by: STUDENT IN AN ORGANIZED HEALTH CARE EDUCATION/TRAINING PROGRAM

## 2019-08-27 PROCEDURE — 96365 THER/PROPH/DIAG IV INF INIT: CPT

## 2019-08-27 PROCEDURE — 84443 ASSAY THYROID STIM HORMONE: CPT | Performed by: STUDENT IN AN ORGANIZED HEALTH CARE EDUCATION/TRAINING PROGRAM

## 2019-08-27 PROCEDURE — 99214 OFFICE O/P EST MOD 30 MIN: CPT | Performed by: ORTHOPAEDIC SURGERY

## 2019-08-27 PROCEDURE — 87186 SC STD MICRODIL/AGAR DIL: CPT | Performed by: NURSE PRACTITIONER

## 2019-08-27 PROCEDURE — 96361 HYDRATE IV INFUSION ADD-ON: CPT

## 2019-08-27 PROCEDURE — 85610 PROTHROMBIN TIME: CPT | Performed by: STUDENT IN AN ORGANIZED HEALTH CARE EDUCATION/TRAINING PROGRAM

## 2019-08-27 PROCEDURE — 99223 1ST HOSP IP/OBS HIGH 75: CPT | Performed by: PSYCHIATRY & NEUROLOGY

## 2019-08-27 PROCEDURE — 73030 X-RAY EXAM OF SHOULDER: CPT

## 2019-08-27 PROCEDURE — 81001 URINALYSIS AUTO W/SCOPE: CPT

## 2019-08-27 PROCEDURE — G8997 SWALLOW GOAL STATUS: HCPCS

## 2019-08-27 PROCEDURE — 70450 CT HEAD/BRAIN W/O DYE: CPT

## 2019-08-27 PROCEDURE — 71045 X-RAY EXAM CHEST 1 VIEW: CPT

## 2019-08-27 PROCEDURE — 84484 ASSAY OF TROPONIN QUANT: CPT | Performed by: STUDENT IN AN ORGANIZED HEALTH CARE EDUCATION/TRAINING PROGRAM

## 2019-08-27 PROCEDURE — 99285 EMERGENCY DEPT VISIT HI MDM: CPT | Performed by: EMERGENCY MEDICINE

## 2019-08-27 PROCEDURE — 93010 ELECTROCARDIOGRAM REPORT: CPT | Performed by: INTERNAL MEDICINE

## 2019-08-27 RX ORDER — DOCUSATE SODIUM 100 MG/1
100 CAPSULE, LIQUID FILLED ORAL DAILY
COMMUNITY

## 2019-08-27 RX ORDER — ONDANSETRON 2 MG/ML
4 INJECTION INTRAMUSCULAR; INTRAVENOUS EVERY 6 HOURS PRN
Status: DISCONTINUED | OUTPATIENT
Start: 2019-08-27 | End: 2019-08-30 | Stop reason: HOSPADM

## 2019-08-27 RX ORDER — CARBIDOPA AND LEVODOPA 50; 200 MG/1; MG/1
1 TABLET, EXTENDED RELEASE ORAL ONCE
Status: COMPLETED | OUTPATIENT
Start: 2019-08-27 | End: 2019-08-27

## 2019-08-27 RX ORDER — ENTACAPONE 200 MG/1
200 TABLET ORAL
Status: DISCONTINUED | OUTPATIENT
Start: 2019-08-27 | End: 2019-08-30 | Stop reason: HOSPADM

## 2019-08-27 RX ORDER — PAROXETINE HYDROCHLORIDE 20 MG/1
20 TABLET, FILM COATED ORAL ONCE
Status: COMPLETED | OUTPATIENT
Start: 2019-08-27 | End: 2019-08-27

## 2019-08-27 RX ORDER — ACETAMINOPHEN 325 MG/1
975 TABLET ORAL EVERY 8 HOURS SCHEDULED
Status: DISCONTINUED | OUTPATIENT
Start: 2019-08-27 | End: 2019-08-30 | Stop reason: HOSPADM

## 2019-08-27 RX ORDER — TRAMADOL HYDROCHLORIDE 50 MG/1
25 TABLET ORAL EVERY 6 HOURS PRN
COMMUNITY
End: 2019-08-30 | Stop reason: HOSPADM

## 2019-08-27 RX ORDER — ENTACAPONE 200 MG/1
200 TABLET ORAL 3 TIMES DAILY
Status: DISCONTINUED | OUTPATIENT
Start: 2019-08-27 | End: 2019-08-27 | Stop reason: DRUGHIGH

## 2019-08-27 RX ORDER — POLYETHYLENE GLYCOL 3350 17 G/17G
17 POWDER, FOR SOLUTION ORAL DAILY PRN
Status: DISCONTINUED | OUTPATIENT
Start: 2019-08-27 | End: 2019-08-27

## 2019-08-27 RX ORDER — PAROXETINE HYDROCHLORIDE 20 MG/1
20 TABLET, FILM COATED ORAL DAILY
COMMUNITY

## 2019-08-27 RX ORDER — SODIUM CHLORIDE 9 MG/ML
50 INJECTION, SOLUTION INTRAVENOUS CONTINUOUS
Status: DISCONTINUED | OUTPATIENT
Start: 2019-08-27 | End: 2019-08-28

## 2019-08-27 RX ORDER — POLYETHYLENE GLYCOL 3350 17 G/17G
17 POWDER, FOR SOLUTION ORAL EVERY EVENING
COMMUNITY

## 2019-08-27 RX ORDER — POLYETHYLENE GLYCOL 3350 17 G/17G
17 POWDER, FOR SOLUTION ORAL DAILY
Status: DISCONTINUED | OUTPATIENT
Start: 2019-08-27 | End: 2019-08-30 | Stop reason: HOSPADM

## 2019-08-27 RX ORDER — CARBIDOPA AND LEVODOPA 50; 200 MG/1; MG/1
1 TABLET, EXTENDED RELEASE ORAL
Status: DISCONTINUED | OUTPATIENT
Start: 2019-08-27 | End: 2019-08-30 | Stop reason: HOSPADM

## 2019-08-27 RX ORDER — LIDOCAINE 50 MG/G
1 PATCH TOPICAL DAILY
Status: DISCONTINUED | OUTPATIENT
Start: 2019-08-27 | End: 2019-08-30 | Stop reason: HOSPADM

## 2019-08-27 RX ORDER — ACETAMINOPHEN 650 MG/1
325 SUPPOSITORY RECTAL EVERY 4 HOURS PRN
Status: DISCONTINUED | OUTPATIENT
Start: 2019-08-27 | End: 2019-08-27

## 2019-08-27 RX ADMIN — ENTACAPONE 200 MG: 200 TABLET ORAL at 14:40

## 2019-08-27 RX ADMIN — ENOXAPARIN SODIUM 40 MG: 40 INJECTION SUBCUTANEOUS at 08:03

## 2019-08-27 RX ADMIN — CARBIDOPA AND LEVODOPA 1 TABLET: 50; 200 TABLET, EXTENDED RELEASE ORAL at 19:33

## 2019-08-27 RX ADMIN — ENTACAPONE 200 MG: 200 TABLET ORAL at 19:33

## 2019-08-27 RX ADMIN — LIDOCAINE 1 PATCH: 50 PATCH CUTANEOUS at 12:58

## 2019-08-27 RX ADMIN — Medication 400 UNITS: at 08:03

## 2019-08-27 RX ADMIN — ENTACAPONE 200 MG: 200 TABLET ORAL at 10:39

## 2019-08-27 RX ADMIN — ACETAMINOPHEN 975 MG: 325 TABLET ORAL at 14:40

## 2019-08-27 RX ADMIN — SODIUM CHLORIDE 50 ML/HR: 0.9 INJECTION, SOLUTION INTRAVENOUS at 04:55

## 2019-08-27 RX ADMIN — CARBIDOPA AND LEVODOPA 1 TABLET: 50; 200 TABLET, EXTENDED RELEASE ORAL at 08:03

## 2019-08-27 RX ADMIN — CARBIDOPA AND LEVODOPA 1 TABLET: 50; 200 TABLET, EXTENDED RELEASE ORAL at 02:55

## 2019-08-27 RX ADMIN — ACETAMINOPHEN 975 MG: 325 TABLET ORAL at 22:37

## 2019-08-27 RX ADMIN — ENTACAPONE 200 MG: 200 TABLET ORAL at 08:02

## 2019-08-27 RX ADMIN — CARBIDOPA AND LEVODOPA 1 TABLET: 50; 200 TABLET, EXTENDED RELEASE ORAL at 22:37

## 2019-08-27 RX ADMIN — ENTACAPONE 200 MG: 200 TABLET ORAL at 02:56

## 2019-08-27 RX ADMIN — Medication 1 TABLET: at 08:03

## 2019-08-27 RX ADMIN — CARBIDOPA AND LEVODOPA 1 TABLET: 50; 200 TABLET, EXTENDED RELEASE ORAL at 14:40

## 2019-08-27 RX ADMIN — POLYETHYLENE GLYCOL 3350 17 G: 17 POWDER, FOR SOLUTION ORAL at 22:37

## 2019-08-27 RX ADMIN — ENTACAPONE 200 MG: 200 TABLET ORAL at 22:37

## 2019-08-27 RX ADMIN — CARBIDOPA AND LEVODOPA 1 TABLET: 50; 200 TABLET, EXTENDED RELEASE ORAL at 10:39

## 2019-08-27 RX ADMIN — CEFTRIAXONE SODIUM 1000 MG: 10 INJECTION, POWDER, FOR SOLUTION INTRAVENOUS at 03:52

## 2019-08-27 RX ADMIN — PAROXETINE 20 MG: 20 TABLET, FILM COATED ORAL at 02:53

## 2019-08-27 RX ADMIN — SODIUM CHLORIDE 500 ML: 0.9 INJECTION, SOLUTION INTRAVENOUS at 01:45

## 2019-08-27 NOTE — ASSESSMENT & PLAN NOTE
Currently the patient seems to have exacerbation of parkinsonian symptoms not having taken her medications for the past 24 hours which is dosed at 5 times a day  Currently she can take the medications by mouth albeit slowly  Will continue observation  Will also get Neurology to see patient  Meanwhile as patient is unable take much by mouth; will place patient on gentle IV hydration

## 2019-08-27 NOTE — SOCIAL WORK
Cm spoke with patient's son Diallo Reddy [phone] over the phone to explain Cm role and discuss discharge planning  Patient presented as confused  Patient's son Diallo Reddy stated patient lives alone in 2 story home with 2STE and 1st floor set up  Patient has 24/7 caregiver/aide that assists with all ADLs  Patient is disoriented to time and situation at baseline per Jesus  Patient is primarily [facility], has hospital bed (does not use), and uses shower chair and BSC  Patient's POA's are her 3 children and had advanced directive at bedside per Jesus  Patient's emergency contact is Diallo Reddy, [phone]  Patient has history at  SNF, was open to [facility] in the past, and denied mental health or ETOH treatment in the past   Pharmacy: [facility] off [address]  PCP: Dr Angelica Menjivar  Patient's family transports to appointments  Cm following  CM reviewed d/c planning process including the following: identifying help at home, patient preference for d/c planning needs, Discharge Lounge, [facility] Meds to Bed program, availability of treatment team to discuss questions or concerns patient and/or family may have regarding understanding medications and recognizing signs and symptoms once discharged  CM also encouraged patient to follow up with all recommended appointments after discharge  Patient advised of importance for patient and family to participate in managing patients medical well being

## 2019-08-27 NOTE — SPEECH THERAPY NOTE
Bedside Swallow Evaluation:    Summary:  Pt presents w/ moderate oral dysphagia characterized by incomplete and insufficient mastication with mechanical soft items  Pharyngeal stage of swallowing appears adequate  The patient does not present with any s/s aspiration with thin liquids  From H&P: Peter Boles is a 80 y o  female who has a past medical history significant for Parkinson disease and takes parkinsonian medications 5 times daily  She also has a history of dementia as well as depression with anxiety  Noted is that medication list is full of medications better able to induce somnolence  That said, she recently had a right clavicular fracture resulting from a fall  She is due to be seen by Orthopedics  But meanwhile, the patient was placed on Ultram by her primary physician who visits her at home  The patient has been sleeping it seems for the past 24 hours  She has not eaten or taken any of her medications  She was seen to be last awake approximately 1 in the morning on Sunday and she was still sleeping as of past midnight  Her primary care physician who does house visits went to her place and according to the caretaker who is with her the assessment was possibility of polypharmacy affecting her wakefulness especially with the use of Ultram as part of recent medications  Currently the patient is alert and awake however she is not communicative  She shaking and regards at the examiner but would not follow commands  There are times that she would attempt to do so but is quite unsuccessful  According to the caretaker these are symptoms that are reminiscent of worsening parkinsonian symptoms should she not take her medication  That said, the patient is placed in hospital in order for her to get her Parkinson's meds and at least get Neurology to see her  Also, she was found to have foul smelling urine and a questionable cystitis    She started on Rocephin    Meanwhile, she is supposed to see her orthopedic surgeon this afternoon ([facility] however caretaker does not know the name)  Will put a referral in  She has mild pain upon extension of her arm especially forced on the right side  Recommendations:  Diet: Puree  Liquid: Thin liquids   Meds: Crushed in puree  Supervision: Full   Positioning:Upright  Strategies: Pt to take PO/Meds only when fully alert and upright  Oral care: As needed   Aspiration precautions    Therapy Prognosis: Fair  Prognosis considerations: Advanced Parkinson's   Frequency: 3-5x week     Consider consult w/:  None at this time     Reason for consult:  R/o aspiration  Determine safest and least restrictive diet  H/o neurological disease    Precautions:  None     Current diet:  Puree with nectar thick liquids   Premorbid diet[de-identified]  Regular (cut small) with thin liquids   Previous VBS:  None  O2 requirement:  None  Voice/Speech:  Weak and low volume   Social:  Has 24 hr caregivers   Follows commands:  Attempts                         Cognitive Status:  Awake and alert; confusion   Oral [facility] exam:  Dentition: WFL  Labial strength and ROM: Weak  Lingual strength and ROM: Weak  Mandibular strength and ROM: Weak  Velum: Not visualized   Patient is rigid with poor ROM     Items administered:  Puree, soft solid, thin liquids  Liquids were taken by straw  Oral stage: Moderate  Lip closure: WFL  Mastication: Inefficient (patient swallows more whole pieces)  Bolus formation: Decreased  Bolus control: WFL  Transfer: WFL  Oral residue: Yes, in between bites  Pocketing: No    Pharyngeal stage: WFL  Swallow promptness: Appears adequate  Laryngeal rise: WFL  Wet voice: No  Throat clear: No  Cough: No  Secondary swallows: No  Audible swallows: No  No overt s/s aspiration    Esophageal stage:  No s/s reported    Aspiration precautions posted    Results d/w:  Pt, nursing, family    Goal(s):  Pt will tolerate least restrictive diet w/out s/s aspiration or oral/pharyngeal difficulties

## 2019-08-27 NOTE — ASSESSMENT & PLAN NOTE
The patient was placed on Ultram as per history for left shoulder pain  The patient has a follow-up with Orthopedics regarding a recent humeral fracture  Will continue referral with Orthopedics

## 2019-08-27 NOTE — QUICK NOTE
Post admit follow up    Patient was seen and examined as a post admission follow up  Patient presented to ED with "worsening parkinsons symptoms" s/p fall  Currently, Pt stable denies pain, abdominal discomfort or any urinary symptoms  Ortho imaging pending  Results to follow  Family at the bedside; reported improved since admission but mental status still not returned to baseline  Neurology consult pending  CTH negative  PT/OT evaluations pending  In regards to recent humeral fracture patient was placed on Tramdol which should be avoided in elderly population  This has been discontinued  Will order scheduled tylenol and lidocaine patch  Possible UTI    Rocephin started on admission  UA with nitrites, leuks, bacteria however no urinary symptoms, fevers or WBC count  Urine cx added  Will follow up  Blood pressure noted to be elevated on admission  Recent ED visit for hypertension noted  This seems to have improved  Family reports her BP to be very labile  Speech consult ordered  Will continue to monitor and follow up inpatient  Family updated at bedside       Valentina, [address] [facility] Internal Medicine

## 2019-08-27 NOTE — CONSULTS
Orthopedics   Юлия Varela 80 y o  female MRN: [id number]  Unit/Bed#: [facility] 804-01      Chief Complaint:   right shoulder pain    HPI:  80 y  o female complaining of right shoulder pain that started insidiously a month ago  Patient has advanced Parkinson's disease, so history was provided by her caretaker  The pain began 1 month ago and is not associated with an acute injury  Caretaker describes the pain started in the R scapular area and localized to the R shoulder   The patient describes the pain as sharp in intensity,  intermittent, occurring with increasing frequency in timing, and localizes the pain to the right glenohumeral joint  The pain is worse with movement and relieved by rest, ice  The pain is not associated with numbness and tingling  The pain is not associated with constitutional symptoms  The patient is not awoken at night by the pain  The patient has had pain medication treatment prescribed by her PCP  Review Of Systems:   · Skin: Normal  · Neuro: See HPI  · Musculoskeletal: See HPI  · 14 point review of systems unobtainable due to pts condition    Past Medical History:   Past Medical History:   Diagnosis Date    Anxiety     Dementia     Depression     Gastric ulcer     H/O:  section     HLD (hyperlipidemia) 2016    Hypercholesteremia     [facility]'s disease ([facility] [address] )     Raynaud's disease        Past Surgical History:   Past Surgical History:   Procedure Laterality Date     SECTION      CHOLECYSTECTOMY      ESOPHAGOGASTRODUODENOSCOPY      KNEE ARTHROSCOPY Left     DE COLONOSCOPY FLX DX W/COLLJ SPEC WHEN PFRMD N/A 3/18/2016    Procedure: COLONOSCOPY;  Surgeon: Marixa Mercado MD;  [facility]; Service: Gastroenterology    DE ESOPHAGOGASTRODUODENOSCOPY TRANSORAL DIAGNOSTIC N/A 2016    Procedure: ESOPHAGOGASTRODUODENOSCOPY (EGD); Surgeon: Marixa Mercado MD;  [facility];   Service: Gastroenterology    DE OPEN RX FEMUR FX+INTRAMED CHARLEY Right 2/23/2017    Procedure: INSERTION NAIL IM FEMUR ANTEGRADE (TROCHANTERIC); Surgeon: Yamil Neal MD;  [facility];  Service: Orthopedics       Family History:  Family history reviewed and non-contributory  History reviewed  No pertinent family history  Social History:  Social History     Socioeconomic History    Marital status:      Spouse name: None    Number of children: None    Years of education: None    Highest education level: None   Occupational History    None   Social Needs    Financial resource strain: None    Food insecurity:     Worry: None     Inability: None    Transportation needs:     Medical: None     Non-medical: None   Tobacco Use    Smoking status: Never Smoker    Smokeless tobacco: Never Used   Substance and Sexual Activity    Alcohol use: Never     Alcohol/week: 1 0 standard drinks     Types: 1 Glasses of wine per week     Frequency: Never     Comment: social    Drug use: No    Sexual activity: None   Lifestyle    Physical activity:     Days per week: None     Minutes per session: None    Stress: None   Relationships    Social connections:     Talks on phone: None     Gets together: None     Attends Alevism service: None     Active member of club or organization: None     Attends meetings of clubs or organizations: None     Relationship status: None    Intimate partner violence:     Fear of current or ex partner: None     Emotionally abused: None     Physically abused: None     Forced sexual activity: None   Other Topics Concern    None   Social History Narrative    None       Allergies: Allergies   Allergen Reactions    Penicillins      Unknown reaction    Does not remember when last had it           Labs:  0   Lab Value Date/Time    HCT 44 7 08/27/2019 0123    HCT 43 6 12/21/2018 0358    HCT 37 9 02/06/2018 0522    HCT 44 2 11/21/2015 0930    HCT 41 2 05/06/2015 1626    HCT 31 3 (L) 04/07/2015 1946    HGB 14 6 08/27/2019 0123    HGB 14 1 12/21/2018 0358    HGB 12 6 02/06/2018 0522    HGB 14 7 11/21/2015 0930    HGB 12 7 05/06/2015 1626    HGB 9 7 (L) 04/07/2015 1946    INR 1 03 08/27/2019 0123    WBC 6 10 08/27/2019 0123    WBC 6 62 12/21/2018 0358    WBC 5 21 02/06/2018 0522    WBC 5 06 11/21/2015 0930    WBC 4 00 (L) 05/06/2015 1626    WBC 7 16 04/07/2015 1946    ESR 14 06/30/2016 0839       Meds:    Current Facility-Administered Medications:     acetaminophen (TYLENOL) rectal suppository 325 mg, 325 mg, Rectal, Q4H PRN, Margaret Shook MD    carbidopa-levodopa (SINEMET CR)  mg per ER tablet 1 tablet, 1 tablet, Oral, 5x Daily, Margaret Shook MD  Learta January  [START ON 8/28/2019] cefTRIAXone (ROCEPHIN) 1,000 mg in dextrose 5 % 50 mL IVPB, 1,000 mg, Intravenous, Q24H, Margaret Shook MD    cholecalciferol (VITAMIN D) oral liquid 400 Units, 400 Units, Oral, Daily, Margaret Shook MD    enoxaparin (LOVENOX) subcutaneous injection 40 mg, 40 mg, Subcutaneous, Daily, Margaret Shook MD    entacapone (COMTAN) tablet 200 mg, 200 mg, Oral, 5x Daily, Margaret Shook MD    multivitamin-minerals (CENTRUM) tablet 1 tablet, 1 tablet, Oral, Daily, Margaret Shook MD    ondansetron [facility]) injection 4 mg, 4 mg, Intravenous, Q6H PRN, Margaret Shook MD    sodium chloride 0 9 % infusion, 50 mL/hr, Intravenous, Continuous, Margaret Shook MD, Last Rate: 50 mL/hr at 08/27/19 0455, 50 mL/hr at 08/27/19 0455    Blood Culture:   No results found for: BLOODCX    Wound Culture:   No results found for: WOUNDCULT    Ins and Outs:  I/O last 24 hours:   In: 550 [IV Piggyback:550]  Out: 300 [Urine:300]          Physical Exam:   BP (!) 178/66 (BP Location: Right arm)   Pulse 59   Temp 98 7 °F (37 1 °C) (Oral)   Resp 18   Ht 5' 4" (1 626 m)   Wt 52 9 kg (116 lb 10 oz)   LMP  (LMP Unknown)   SpO2 98%   BMI 20 02 kg/m²   Gen: Alert and oriented to person, place, time  HEENT: EOMI, eyes clear, moist mucus membranes, hearing intact  Respiratory: Bilateral chest rise  No audible wheezing found  Cardiovascular: Regular Rate and Rhythm  Abdomen: soft nontender/nondistended  Musculoskeletal: right upper extremity  · Skin pink dry and intact  · Painful passive range of motion on external and internal rotation  · Active ROM patient not compliant with exam  · Passive ROM 90 degrees abduction and 90 degrees forward flexion  · Sensation intact to axillary, musculocutaneous, radial, ulna, median nerves  · Motor strength not adequately evaluated due to patient's baseline Advanced Parkinson's disease  Radiology:   I personally reviewed the films  X-rays of right shoulder shows osteoarthritic changes in [facility] joint and glenohumeral joint with no acute fractures or dislocations     _*_*_*_*_*_*_*_*_*_*_*_*_*_*_*_*_*_*_*_*_*_*_*_*_*_*_*_*_*_*_*_*_*_*_*_*_*_*_*_*_*    Assessment:  80 y  o female with right shoulder pain with atraumatic etiology       Plan:   · WBAT RUE  · Will discuss with family R shoulder CS injection later today  · Dispo: Ortho signing off after discussion with family      Cachorro Pineda MD

## 2019-08-27 NOTE — H&P
H&P- Liz García 5/8/1930, 80 y o  female MRN: [id number]    Unit/Bed#: ED 02 Encounter: [id number]    Primary Care Provider: Levorn Seip, MD   Date and time admitted to hospital: 8/27/2019 12:47 AM        * Parkinson disease, symptomatic ([facility] [address] )  Assessment & Plan  Currently the patient seems to have exacerbation of parkinsonian symptoms not having taken her medications for the past 24 hours which is dosed at 5 times a day  Currently she can take the medications by mouth albeit slowly  Will continue observation  Will also get Neurology to see patient  Meanwhile as patient is unable take much by mouth; will place patient on gentle IV hydration  Polypharmacy  Assessment & Plan  The patient was placed on Ultram as per history for left shoulder pain  The patient has a follow-up with Orthopedics regarding a recent humeral fracture  Will continue referral with Orthopedics  Acute cystitis without hematuria  Assessment & Plan  Presumptive cystitis UTI is being thought of  Patient started on Rocephin  Will also administer intravenous fluids at a low rate  In that way, the patient gets adequate hydration was she is unable to take much by mouth  Procalcitonin  Will also get follow-up metabolic profile CBC with differential tomorrow  Depression with anxiety  Assessment & Plan  Currently the patient is on multiple anti anxiety and depressive medications  However with the patient's tendency for polypharmacy and its presentation of increased somnolence; will put it on hold  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  Assessment & Plan  Patient has a recent closed fracture of the right clavicle  She is due to be seen outpatient by Orthopedics this afternoon    Will put referral           VTE Prophylaxis: Enoxaparin (Lovenox)  / sequential compression device   Code Status: Prior do not resuscitate as per POLST  POLST: POLST form is on file already (pre-hospital)    Anticipated Length of Stay:  Patient will be admitted on an Observation basis with an anticipated length of stay of  less than 2 midnights  Justification for Hospital Stay: Please see detailed plans noted above  Chief Complaint:     Exacerbation of parkinsonian symptoms  History of Present Illness:  Vlad Shirley is a 80 y o  female who has a past medical history significant for Parkinson disease and takes parkinsonian medications 5 times daily  She also has a history of dementia as well as depression with anxiety  Noted is that medication list is full of medications better able to induce somnolence  That said, she recently had a right clavicular fracture resulting from a fall  She is due to be seen by Orthopedics  But meanwhile, the patient was placed on Ultram by her primary physician who visits her at home  The patient has been sleeping it seems for the past 24 hours  She has not eaten or taken any of her medications  She was seen to be last awake approximately 1 in the morning on Sunday and she was still sleeping as of past midnight  Her primary care physician who does house visits went to her place and according to the caretaker who is with her the assessment was possibility of polypharmacy affecting her wakefulness especially with the use of Ultram as part of recent medications  Currently the patient is alert and awake however she is not communicative  She shaking and regards at the examiner but would not follow commands  There are times that she would attempt to do so but is quite unsuccessful  According to the caretaker these are symptoms that are reminiscent of worsening parkinsonian symptoms should she not take her medication  That said, the patient is placed in hospital in order for her to get her Parkinson's meds and at least get Neurology to see her  Also, she was found to have foul smelling urine and a questionable cystitis  She started on Rocephin      Meanwhile, she is supposed to see her orthopedic surgeon this afternoon ([facility] however caretaker does not know the name)  Will put a referral in  She has mild pain upon extension of her arm especially forced on the right side  Review of Systems:    Constitutional:  Denies fever or chills   Eyes:  Denies change in visual acuity   HENT:  Denies nasal congestion or sore throat   Respiratory:  Denies cough or shortness of breath   Cardiovascular:  Denies chest pain or edema   GI:  Denies abdominal pain, nausea, vomiting, bloody stools or diarrhea   :  Denies dysuria   Musculoskeletal:  Joint pain more so at the rightclavicular area  Integument:  Denies rash   Neurologic:  Denies headache, focal weakness or sensory changes with increased shaking intentional tremor  Endocrine:  Denies polyuria or polydipsia   Lymphatic:  Denies swollen glands   Psychiatric:  Denies depression or anxiety     Past Medical and Surgical History:   Past Medical History:   Diagnosis Date    Anxiety     Dementia     Depression     Gastric ulcer     H/O:  section     HLD (hyperlipidemia) 2016    Hypercholesteremia     Parkinson's disease ([facility] [address] )     Raynaud's disease      Past Surgical History:   Procedure Laterality Date     SECTION      CHOLECYSTECTOMY      ESOPHAGOGASTRODUODENOSCOPY      KNEE ARTHROSCOPY Left     ND COLONOSCOPY FLX DX W/COLLJ SPEC WHEN PFRMD N/A 3/18/2016    Procedure: COLONOSCOPY;  Surgeon: Naida Fry MD;  [facility]; Service: Gastroenterology    ND ESOPHAGOGASTRODUODENOSCOPY TRANSORAL DIAGNOSTIC N/A 2016    Procedure: ESOPHAGOGASTRODUODENOSCOPY (EGD); Surgeon: Naida Fry MD;  [facility]; Service: Gastroenterology    ND OPEN RX FEMUR FX+INTRAMED CHARLEY Right 2017    Procedure: INSERTION NAIL IM FEMUR ANTEGRADE (TROCHANTERIC);   Surgeon: Brianne Elmore MD;  [facility];  Service: Orthopedics       Meds/Allergies:    (Not in a hospital admission)  Acetaminophen 325 MG CAPS Take by mouth as needed  Shira Davis MD Replaced   Ordered as: acetaminophen (TYLENOL) rectal suppository 325 mg - 325 mg, Rectal, Every 4 hours PRN, mild pain, Starting Tue 8/27/19 at 0354   carbidopa-levodopa-entacapone (STALEVO) -200 MG per tablet Take 1tab 5 times a day (every 4 hours) Shira Davis MD Not Ordered   Cyanocobalamin (VITAMIN B-12 PO) Take by mouth  Shira Davis MD Not Ordered   docusate sodium (COLACE) 100 mg capsule Take 100 mg by mouth daily Shira Davis MD Not Ordered   Melatonin-Pyridoxine (MELATIN PO) Take 1 tablet by mouth daily at bedtime Shira Davis MD Not Ordered   mirtazapine (REMERON) 15 mg tablet Take 1 tablet by mouth daily at bedtime for 30 days Shira Davis MD Not Ordered   Multiple Vitamins-Minerals (CENTRUM SILVER ADULT 50+ PO) Take by mouth daily  Shira Davis MD Reordered   Ordered as: multivitamin-minerals (CENTRUM) tablet 1 tablet - 1 tablet, Oral, Daily, First dose on Tue 8/27/19 at 0900 **DISPOSE IN 8 GALLON BLACK CONTAINER**    PARoxetine (PAXIL) 20 mg tablet Take 20 mg by mouth daily Shira Davis MD Not Ordered   Calcium Carbonate-Vitamin D (OYST-KANDI D PO) Take 1 tablet by mouth daily 500mg OsCal/Calcium  Shira Davis MD Not Ordered   carbidopa-levodopa (PARCOPA)  mg per disintegrating tablet 1- 2 times prn off periods Shira Davis MD Reordered   Ordered as: carbidopa-levodopa (SINEMET CR)  mg per ER tablet 1 tablet - 1 tablet, Oral, 5 times daily, First dose on Tue 8/27/19 at 0700 Do not crush or chew   LOOK ALIKE SOUND ALIKE MED    Cholecalciferol (VITAMIN D PO) Take 1 tablet by mouth daily   Shira Davis MD Reordered   Ordered as: cholecalciferol (VITAMIN D) oral liquid 400 Units - 400 Units, Oral, Daily, First dose on Tue 8/27/19 at 0900   Doxylamine Succinate, Sleep, (UNISOM PO) Take 1 capsule by mouth daily at bedtime Shira Davis MD Not Ordered   glucosamine 500 MG CAPS capsule Take 1,000 mg by mouth daily   Gucci Charles MD Not Ordered   solifenacin (VESICARE) 5 mg tablet Take 5 mg by mouth daily after dinner Gucci Charles MD Not Ordered       Allergies: Allergies   Allergen Reactions    Penicillins      Unknown reaction  Does not remember when last had it     History:  Marital Status:    Occupation: reired  Patient Pre-hospital Living Situation: home lives with caretaker  Patient Pre-hospital Level of Mobility: ambulatory  Patient Pre-hospital Diet Restrictions: regular  Substance Use History:   Social History     Substance and Sexual Activity   Alcohol Use Yes    Alcohol/week: 1 0 standard drinks    Types: 1 Glasses of wine per week    Comment: social     Social History     Tobacco Use   Smoking Status Never Smoker   Smokeless Tobacco Never Used     Social History     Substance and Sexual Activity   Drug Use No       Family History:  History reviewed  No pertinent family history  Physical Exam:     Vitals:   Blood Pressure: 156/66 (08/27/19 0330)  Pulse: 56 (08/27/19 0330)  Temperature: 98 7 °F (37 1 °C) (08/27/19 0053)  Temp Source: Oral (08/27/19 0053)  Respirations: 22 (08/27/19 0330)  SpO2: 94 % (08/27/19 0330)    Constitutional:  Well developed, well nourished, no acute distress, and somewhat static  The patient intends to communicate but does not have any verbal output  Intentional tremor pronounced  Eyes:  PERRL, conjunctiva normal   HENT:  Atraumatic, external ears normal, nose normal, oropharynx mildly dry, no pharyngeal exudates  Neck- normal range of motion, no tenderness, supple   Respiratory:  No respiratory distress, normal breath sounds, no rales, no wheezing   Cardiovascular:  Normal rate, normal rhythm, no murmurs, no gallops, no rubs   GI:  Soft, nondistended, normal bowel sounds, nontender, no organomegaly, no mass, no rebound, no guarding   :  No costovertebral angle tenderness   Musculoskeletal:  No edema, no tenderness, no deformities   Back- no tenderness  Integument:  Well hydrated, no rash   Lymphatic:  No lymphadenopathy noted   Neurologic:  Alert and awake  Follows the examiner visually  Does not have any preferential movement however slow and static  Psychiatric:  Speech and behavior could not be demonstrated at this time  Scanty output  Lab Results: I have personally reviewed pertinent reports  Results from last 7 days   Lab Units 08/27/19  0123   WBC Thousand/uL 6 10   HEMOGLOBIN g/dL 14 6   HEMATOCRIT % 44 7   PLATELETS Thousands/uL 235   NEUTROS PCT % 65   LYMPHS PCT % 26   MONOS PCT % 6   EOS PCT % 2     Results from last 7 days   Lab Units 08/27/19  0123   POTASSIUM mmol/L 4 9   CHLORIDE mmol/L 102   CO2 mmol/L 30   BUN mg/dL 24   CREATININE mg/dL 0 82   CALCIUM mg/dL 9 4   ALK PHOS U/L 88   ALT U/L 34   AST U/L 39     Results from last 7 days   Lab Units 08/27/19  0123   INR  1 03           Imaging: I have personally reviewed pertinent reports  Ct Head Without Contrast    Result Date: 8/27/2019  Narrative: CT BRAIN - WITHOUT CONTRAST INDICATION:   Confusion/delirium, altered LOC, unexplained  COMPARISON:  CT of the head on February 5, 2018  TECHNIQUE:  CT examination of the brain was performed  In addition to axial images, coronal 2D reformatted images were created and submitted for interpretation  Radiation dose length product (DLP) for this visit:  966 93 mGy-cm   This examination, like all CT scans performed in the [facility], was performed utilizing techniques to minimize radiation dose exposure, including the use of iterative  reconstruction and automated exposure control  IMAGE QUALITY:  Diagnostic  FINDINGS: PARENCHYMA: Decreased attenuation is noted in periventricular and subcortical white matter demonstrating an appearance that is statistically most likely to represent mild microangiopathic change  No CT signs of acute infarction  No intracranial mass, mass effect or midline shift    No acute parenchymal hemorrhage  VENTRICLES AND EXTRA-AXIAL SPACES:  Normal for the patient's age  VISUALIZED ORBITS AND PARANASAL SINUSES:  Bilateral ocular lens replacements  Polypoid mucosal thickening within the maxillary sinuses  CALVARIUM AND EXTRACRANIAL SOFT TISSUES:  Normal      Impression: 1  No acute intracranial hemorrhage, midline shift, or mass effect  2   Chronic small vessel stomach changes  Workstation performed: [id number]         ** Please Note: Dragon 360 Dictation voice to text software was used in the creation of this document   **

## 2019-08-27 NOTE — ED ATTENDING ATTESTATION
I, [address] [place] [address], DO, saw and evaluated the patient  I have discussed the patient with the resident/non-physician practitioner and agree with the resident's/non-physician practitioner's findings, Plan of Care, and MDM as documented in the resident's/non-physician practitioner's note, except where noted  All available labs and Radiology studies were reviewed  I was present for key portions of any procedure(s) performed by the resident/non-physician practitioner and I was immediately available to provide assistance  At this point I agree with the current assessment done in the Emergency Department  I have conducted an independent evaluation of this patient a history and physical is as follows:    80-year-old female presents with altered mental status  History is provided by the caregiver who states patient has history of advanced Parkinson's disease and apparently has episodes where she goes into a "deep sleep"  Patient had 1 of these episodes and slept the past 23 hours  Asthma her medications food or water at that time for a  Patient unable to give any reliable history  On exam-no acute distress, arouses to verbal stimuli is oriented person and place, heart regular no respiratory distress, abdomen soft nontender    Plan-CT head, check labs and urine    Critical Care Time  Procedures

## 2019-08-27 NOTE — ED PROVIDER NOTES
History  Chief Complaint   Patient presents with    Altered Mental Status     Pt presents from home where last night (8/26/19) her caregivers noticed her acting differently  Called tonight after tonight she started acting off her normal as well  Pt baseline is a GCS 14 with advanced parkinson's  EMS found sugar to be 56, given 200 D10 , retaken 120     This is an 79yo female with a PMHx of advanced parkinson's who presents to the ED this morning with altered mental status  History is provided by the caregiver  Patient apparently has "deep sleep" episodes weekly and the patient has been asleep since 0200 on 26Aug  Patient did not receive any of her meds yesterday and did not eat or drink anything  Patient's aid called the PCP at 0000 today who then came to the patient's house to examine her and recommended that the patient come to the ED  The patient has been complaining of R shoulder pain for the past few weeks and has been taking tramadol  The PCP is concerned that the tramadol might be making her too sleepy  Patient started to wake up in the ambulance and arrived to the ED awake but not speaking at all  She is normally conversational and can follow commands but is not able to do any of that currently  Patient had a wet diaper at 1800 yesterday when the previous aide changed her but has not urinated since  Prior to Admission Medications   Prescriptions Last Dose Informant Patient Reported? Taking? Acetaminophen 325 MG CAPS  Spouse/Significant Other Yes Yes   Sig: Take by mouth as needed    Calcium Carbonate-Vitamin D (OYST-KANDI D PO)  Spouse/Significant Other Yes No   Sig: Take 1 tablet by mouth daily 500mg OsCal/Calcium    Cholecalciferol (VITAMIN D PO)  Spouse/Significant Other Yes No   Sig: Take 1 tablet by mouth daily     Cyanocobalamin (VITAMIN B-12 PO)  Spouse/Significant Other Yes Yes   Sig: Take by mouth     Doxylamine Succinate, Sleep, (UNISOM PO)   Yes No   Sig: Take 1 capsule by mouth daily at bedtime   Melatonin-Pyridoxine (MELATIN PO)   Yes Yes   Sig: Take 1 tablet by mouth daily at bedtime   Multiple Vitamins-Minerals (CENTRUM SILVER ADULT 50+ PO)  Spouse/Significant Other Yes Yes   Sig: Take by mouth daily  PARoxetine (PAXIL) 20 mg tablet   Yes Yes   Sig: Take 20 mg by mouth daily   carbidopa-levodopa (PARCOPA)  mg per disintegrating tablet   No No   Si- 2 times prn off periods   carbidopa-levodopa-entacapone (STALEVO) -200 MG per tablet   No Yes   Sig: Take 1tab 5 times a day (every 4 hours)   docusate sodium (COLACE) 100 mg capsule   Yes Yes   Sig: Take 100 mg by mouth daily   glucosamine 500 MG CAPS capsule  Spouse/Significant Other Yes No   Sig: Take 1,000 mg by mouth daily     mirtazapine (REMERON) 15 mg tablet   No Yes   Sig: Take 1 tablet by mouth daily at bedtime for 30 days   solifenacin (VESICARE) 5 mg tablet  Spouse/Significant Other Yes No   Sig: Take 5 mg by mouth daily after dinner      Facility-Administered Medications: None       Past Medical History:   Diagnosis Date    Anxiety     Dementia     Depression     Gastric ulcer     H/O:  section     HLD (hyperlipidemia) 2016    Hypercholesteremia     Parkinson's disease ([facility] [address] )     Raynaud's disease        Past Surgical History:   Procedure Laterality Date     SECTION      CHOLECYSTECTOMY      ESOPHAGOGASTRODUODENOSCOPY      KNEE ARTHROSCOPY Left     [place] COLONOSCOPY FLX DX W/COLLJ SPEC WHEN PFRMD N/A 3/18/2016    Procedure: COLONOSCOPY;  Surgeon: Jaleesa Augustine MD;  [facility]; Service: Gastroenterology    [place] ESOPHAGOGASTRODUODENOSCOPY TRANSORAL DIAGNOSTIC N/A 2016    Procedure: ESOPHAGOGASTRODUODENOSCOPY (EGD); Surgeon: Jaleesa Augustine MD;  [facility]; Service: Gastroenterology    W[place] OPEN RX FEMUR FX+INTRAMED CHARLEY Right 2017    Procedure: INSERTION NAIL IM FEMUR ANTEGRADE (TROCHANTERIC);   Surgeon: Gino Ceja MD;  [facility];  Service: Orthopedics History reviewed  No pertinent family history  I have reviewed and agree with the history as documented  Social History     Tobacco Use    Smoking status: Never Smoker    Smokeless tobacco: Never Used   Substance Use Topics    Alcohol use: Yes     Alcohol/week: 1 0 standard drinks     Types: 1 Glasses of wine per week     Comment: social    Drug use: No        Review of Systems   Unable to perform ROS: Mental status change       Physical Exam  ED Triage Vitals [08/27/19 0053]   Temperature Pulse Respirations Blood Pressure SpO2   98 7 °F (37 1 °C) (!) 142 18 153/84 98 %      Temp Source Heart Rate Source Patient Position - Orthostatic VS BP Location FiO2 (%)   Oral Monitor Lying Right arm --      Pain Score       No Pain             Orthostatic Vital Signs  Vitals:    08/27/19 0053 08/27/19 0056 08/27/19 0215 08/27/19 0330   BP: 153/84  (!) 174/80 156/66   Pulse: (!) 142 70 58 56   Patient Position - Orthostatic VS: Lying  Lying Lying       Physical Exam   Constitutional: She appears well-developed and well-nourished  No distress  HENT:   Head: Normocephalic and atraumatic  Eyes: Pupils are equal, round, and reactive to light  Conjunctivae and EOM are normal  Right eye exhibits no discharge  Left eye exhibits no discharge  No scleral icterus  Neck: Normal range of motion  Neck supple  No JVD present  Cardiovascular: Normal rate, regular rhythm and normal heart sounds  Exam reveals no gallop and no friction rub  No murmur heard  Pulmonary/Chest: Effort normal and breath sounds normal  No stridor  No respiratory distress  She has no wheezes  She has no rales  Abdominal: Soft  Bowel sounds are normal  She exhibits no distension  There is no tenderness  There is no guarding  Musculoskeletal: Normal range of motion  She exhibits no edema, tenderness or deformity  Neurological: No cranial nerve deficit  She exhibits normal muscle tone  GCS eye subscore is 4  GCS verbal subscore is 1   GCS motor subscore is 3  Patient with a constant tremor of the RUE that the aide states is normal  She appears to be contracted in bilateral upper extremities as well but the aide also states this is her normal  Patient does not respond to any questions or commands  Skin: Skin is warm and dry  No rash noted  She is not diaphoretic  No erythema  No pallor  No sacral ulcers   Nursing note and vitals reviewed        ED Medications  Medications   entacapone (COMTAN) tablet 200 mg (200 mg Oral Given 8/27/19 0256)   ceftriaxone (ROCEPHIN) 1 g/50 mL in dextrose IVPB (1,000 mg Intravenous New Bag 8/27/19 0352)   sodium chloride 0 9 % bolus 500 mL (0 mL Intravenous Stopped 8/27/19 0255)   carbidopa-levodopa (SINEMET CR)  mg per ER tablet 1 tablet (1 tablet Oral Given 8/27/19 0255)   PARoxetine (PAXIL) tablet 20 mg (20 mg Oral Given 8/27/19 0253)       Diagnostic Studies  Results Reviewed     Procedure Component Value Units Date/Time    Urine Microscopic [[id number]]  (Abnormal) Collected:  08/27/19 0207    Lab Status:  Final result Specimen:  Urine, Clean Catch Updated:  08/27/19 0306     RBC, UA None Seen /hpf      WBC, UA 4-10 /hpf      Epithelial Cells Occasional /hpf      Bacteria, UA Occasional /hpf      URINE COMMENT Yeast cells present      CK (with reflex to MB) [[id number]]     Lab Status:  No result Specimen:  Blood     POCT urinalysis dipstick [[id number]]  (Normal) Resulted:  08/27/19 0208    Lab Status:  Final result Specimen:  Urine Updated:  08/27/19 0208     Color, UA tito    ED Urine Macroscopic [[id number]]  (Abnormal) Collected:  08/27/19 0207    Lab Status:  Final result Specimen:  Urine Updated:  08/27/19 0206     Color, UA Tito     Clarity, UA Clear     pH, UA 7 0     Leukocytes, UA Small     Nitrite, UA Positive     Protein, UA Negative mg/dl      Glucose, UA Negative mg/dl      Ketones, UA 15 (1+) mg/dl      Urobilinogen, UA 0 2 E U /dl      Bilirubin, UA Negative     Blood, UA Trace Specific [place], UA 1 015    Narrative:       CLINITEK RESULT    Comprehensive metabolic panel [[id number]] Collected:  08/27/19 0123    Lab Status:  Final result Specimen:  Blood from Arm, Left Updated:  08/27/19 0203     Sodium 139 mmol/L      Potassium 4 9 mmol/L      Chloride 102 mmol/L      CO2 30 mmol/L      ANION GAP 7 mmol/L      BUN 24 mg/dL      Creatinine 0 82 mg/dL      Glucose 127 mg/dL      Calcium 9 4 mg/dL      AST 39 U/L      ALT 34 U/L      Alkaline Phosphatase 88 U/L      Total Protein 7 4 g/dL      Albumin 3 6 g/dL      Total Bilirubin 0 65 mg/dL      eGFR 64 ml/min/1 73sq m     Narrative:       [place] guidelines for Chronic Kidney Disease (CKD):     Stage 1 with normal or high GFR (GFR > 90 mL/min/1 73 square meters)    Stage 2 Mild CKD (GFR = 60-89 mL/min/1 73 square meters)    Stage 3A Moderate CKD (GFR = 45-59 mL/min/1 73 square meters)    Stage 3B Moderate CKD (GFR = 30-44 mL/min/1 73 square meters)    Stage 4 Severe CKD (GFR = 15-29 mL/min/1 73 square meters)    Stage 5 End Stage CKD (GFR <15 mL/min/1 73 square meters)  Note: GFR calculation is accurate only with a steady state creatinine    TSH, 3rd generation with Free T4 reflex [[id number]]  (Normal) Collected:  08/27/19 0123    Lab Status:  Final result Specimen:  Blood from Arm, Left Updated:  08/27/19 0203     TSH 3RD GENERATON 2 500 uIU/mL     Narrative:       Patients undergoing fluorescein dye angiography may retain small amounts of fluorescein in the body for 48-72 hours post procedure  Samples containing fluorescein can produce falsely depressed TSH values  If the patient had this procedure,a specimen should be resubmitted post fluorescein clearance        Lipase [[id number]]  (Abnormal) Collected:  08/27/19 0123    Lab Status:  Final result Specimen:  Blood from Arm, Left Updated:  08/27/19 0203     Lipase 53 u/L     Troponin I [[id number]]  (Normal) Collected:  08/27/19 0123    Lab Status:  Final result Specimen:  Blood from Arm, Left Updated:  08/27/19 0148     Troponin I <0 02 ng/mL     Protime-INR [[id number]]  (Normal) Collected:  08/27/19 0123    Lab Status:  Final result Specimen:  Blood from Arm, Left Updated:  08/27/19 0141     Protime 13 1 seconds      INR 1 03    APTT [[id number]]  (Normal) Collected:  08/27/19 0123    Lab Status:  Final result Specimen:  Blood from Arm, Left Updated:  08/27/19 0141     PTT 27 seconds     CBC and differential [[id number]] Collected:  08/27/19 0123    Lab Status:  Final result Specimen:  Blood from Arm, Left Updated:  08/27/19 0130     WBC 6 10 Thousand/uL      RBC 4 64 Million/uL      Hemoglobin 14 6 g/dL      Hematocrit 44 7 %      MCV 96 fL      MCH 31 5 pg      MCHC 32 7 g/dL      RDW 14 1 %      MPV 11 2 fL      Platelets 064 Thousands/uL      nRBC 0 /100 WBCs      Neutrophils Relative 65 %      Immat GRANS % 0 %      Lymphocytes Relative 26 %      Monocytes Relative 6 %      Eosinophils Relative 2 %      Basophils Relative 1 %      Neutrophils Absolute 3 96 Thousands/µL      Immature Grans Absolute 0 01 Thousand/uL      Lymphocytes Absolute 1 61 Thousands/µL      Monocytes Absolute 0 39 Thousand/µL      Eosinophils Absolute 0 09 Thousand/µL      Basophils Absolute 0 04 Thousands/µL                  CT head without contrast   Final Result by Ange Pabon MD (08/27 6669)      1  No acute intracranial hemorrhage, midline shift, or mass effect  2   Chronic small vessel stomach changes                    Workstation performed: [id number]         XR humerus RIGHT    (Results Pending)   XR chest 1 view    (Results Pending)   XR shoulder 2+ views RIGHT    (Results Pending)         Procedures  ECG 12 Lead Documentation Only  Date/Time: 8/27/2019 2:35 AM  Performed by: Kiah Arrieta MD  Authorized by: Kiah Arrieta MD     Indications / Diagnosis:  AMS  ECG reviewed by me, the ED Provider: yes    Patient location:  ED  Interpretation:     Interpretation: abnormal    Rate:     ECG rate assessment: normal    Rhythm:     Rhythm: sinus rhythm    Ectopy:     Ectopy: none    QRS:     QRS axis:  Normal    QRS intervals:  Normal  Conduction:     Conduction: abnormal      Abnormal conduction: complete RBBB    ST segments:     ST segments:  Normal  T waves:     T waves: normal    Comments:      QTc is 448ms            ED Course                               MDM  Number of Diagnoses or Management Options  Diagnosis management comments: On re-evaluation the patient still is not back to her baseline according to her aide  Her urine showed evidence of a possible urinary tract infection and she was started on Rocephin  Spoke with slim who agreed to admit the patient to their service as an observation  Disposition  Final diagnoses: Altered mental status   UTI (urinary tract infection)     Time reflects when diagnosis was documented in both MDM as applicable and the Disposition within this note     Time User Action Codes Description Comment    8/27/2019  4:12 AM Yovani LUX Add [G20] Parkinson disease, symptomatic ([facility] [address] )     8/27/2019  4:12 AM Yovani LUX Modify [G20] Parkinson disease, symptomatic ([facility] )     8/27/2019  4:19 AM Gianni Bernard Add [R41 82] Altered mental status     8/27/2019  4:19 AM Gianni Bernard Add [N39 0] UTI (urinary tract infection)       ED Disposition     ED Disposition Condition Date/Time Comment    Admit Stable Tue Aug 27, 2019  4:12 AM Case was discussed with constance and the patient's admission status was agreed to be Admission Status: observation status to the service of Dr Mercedez Newberry   Follow-up Information    None         Patient's Medications   Discharge Prescriptions    No medications on file     No discharge procedures on file  ED Provider  Attending physically available and evaluated Keagan Singh  I managed the patient along with the ED Attending      Electronically Signed by         Eduardo Hoyos MD  08/27/19 0198

## 2019-08-27 NOTE — UTILIZATION REVIEW
Initial Clinical Review    Admission: Date/Time/Statement: OBSERVATION 08/27/19 @ 0416   Orders Placed This Encounter   Procedures    Place in Observation     Standing Status:   Standing     Number of Occurrences:   1     Order Specific Question:   Admitting Physician     Answer:   Ursula Singh [[id number]]     Order Specific Question:   Level of Care     Answer:   Med Surg [[id number]]     ED Arrival Information     Expected Arrival Acuity Means of Arrival Escorted By Service Admission Type    - 8/27/2019 00:46 Urgent Ambulance [place] Hospitalist Urgent    Arrival Complaint    Altered Mental Status        Chief Complaint   Patient presents with    Altered Mental Status     Pt presents from home where last night (8/26/19) her caregivers noticed her acting differently  Called tonight after tonight she started acting off her normal as well  Pt baseline is a GCS 14 with advanced parkinson's  EMS found sugar to be 56, given 200 D10 , retaken 120     Assessment/Plan:   Remi Hutton is a 80 y o  female who has a past medical history significant for Parkinson disease and takes parkinsonian medications 5 times daily  She also has a history of dementia as well as depression with anxiety  Noted is that medication list is full of medications better able to induce somnolence  That said, she recently had a right clavicular fracture resulting from a fall  She is due to be seen by Orthopedics  But meanwhile, the patient was placed on Ultram by her primary physician who visits her at home  The patient has been sleeping it seems for the past 24 hours  She has not eaten or taken any of her medications  She was seen to be last awake approximately 1 in the morning on Sunday and she was still sleeping as of past midnight    Her primary care physician who does house visits went to her place and according to the caretaker who is with her the assessment was possibility of polypharmacy affecting her wakefulness especially with the use of Ultram as part of recent medications  Currently the patient is alert and awake however she is not communicative  She shaking and regards at the examiner but would not follow commands  There are times that she would attempt to do so but is quite unsuccessful  According to the caretaker these are symptoms that are reminiscent of worsening parkinsonian symptoms should she not take her medication  That said, the patient is placed in hospital in order for her to get her Parkinson's meds and at least get Neurology to see her  Also, she was found to have foul smelling urine and a questionable cystitis  She started on Rocephin  Meanwhile, she is supposed to see her orthopedic surgeon this afternoon ([facility] however caretaker does not know the name)  Will put a referral in  She has mild pain upon extension of her arm especially forced on the right side  GCS = 14 X 2      8/27 AFTERNOON UPDATE - NEUROLOGY CONSULT   William Houser is a 81 yo F with PMHx of advanced Parkinson's, dementia, and depression/anxiety who presents with altered mental status following prolonged sleep episode, missing daily medications  Plan:  AMS:  Current causes include polypharmacy (tramadol/paroxetine) vs missing daily Parkinson's meds vs UTI   - tramadol given for shoulder pain, ortho saw and recommended CS shot, tramadol discontinued  - consider tapering off paroxetine and finding alternative, discuss with family  - Return to daily Parkinson medication regimen and watch for return to baseline  - Primary team started patient on rocephin      8/27 Orly Cat -  80 y  o female with right shoulder pain with atraumatic etiology       Plan:   · WBAT RUE  · Will discuss with family R shoulder CS injection later today  · Dispo: Ortho signing off after discussion with family  +++++++++++++++++++++++++++++++++++++++++++++++++++++++++++  * Parkinson disease, symptomatic ([address] )  Assessment & Plan  Currently the patient seems to have exacerbation of parkinsonian symptoms not having taken her medications for the past 24 hours which is dosed at 5 times a day  Currently she can take the medications by mouth albeit slowly  Will continue observation  Will also get Neurology to see patient  Meanwhile as patient is unable take much by mouth; will place patient on gentle IV hydration      Polypharmacy  Assessment & Plan  The patient was placed on Ultram as per history for left shoulder pain  The patient has a follow-up with Orthopedics regarding a recent humeral fracture  Will continue referral with Orthopedics      Acute cystitis without hematuria  Assessment & Plan  Presumptive cystitis UTI is being thought of  Patient started on Rocephin  Will also administer intravenous fluids at a low rate  In that way, the patient gets adequate hydration was she is unable to take much by mouth  Procalcitonin  Will also get follow-up metabolic profile CBC with differential tomorrow      Depression with anxiety  Assessment & Plan  Currently the patient is on multiple anti anxiety and depressive medications  However with the patient's tendency for polypharmacy and its presentation of increased somnolence; will put it on hold      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  Assessment & Plan  Patient has a recent closed fracture of the right clavicle  She is due to be seen outpatient by Orthopedics this afternoon    Will put referral       VTE Prophylaxis: Enoxaparin (Lovenox)  / sequential compression device   Code Status: Prior do not resuscitate as per [facility]    ED Triage Vitals [08/27/19 0053]   Temperature Pulse Respirations Blood Pressure SpO2   98 7 °F (37 1 °C) (!) 142 18 153/84 98 %      Temp Source Heart Rate Source Patient Position - Orthostatic VS BP Location FiO2 (%)   Oral Monitor Lying Right arm --      Pain Score       No Pain        Wt Readings from Last 1 Encounters:   08/27/19 52 9 kg (116 lb 10 oz)     Additional Vital Signs:   08/27/19 07:12:36  98 3 °F (36 8 °C)  64  16  165/67  100  96 %     08/27/19 0503        178/66Abnormal          08/27/19 04:58:10    59  18      98 %     08/27/19 0400    58  22  169/75    96 %  None (Room air)   08/27/19 0330    56  22  156/66    94 %  None (Room air)   08/27/19 0215    58  22  174/80Abnormal     98 %  None (Room air)   08/27/19 0056    70               Pertinent Labs/Diagnostic Test Results:     8/27 XRAY R HUMERUS - No acute osseous abnormality  8/27 CXR - No acute cardiopulmonary disease  8/27 CT HEAD - 1  No acute intracranial hemorrhage, midline shift, or mass effect  2   Chronic small vessel stomach changes  8/27 XRAY R SHOULDER - No acute osseous abnormality      8/27 ECG - Normal sinus rhythm  Right bundle branch block  Abnormal ECG  When compared with ECG of 17-AUG-2019 09:31,  No significant change was found    Results from last 7 days   Lab Units 08/27/19  0123   WBC Thousand/uL 6 10   HEMOGLOBIN g/dL 14 6   HEMATOCRIT % 44 7   PLATELETS Thousands/uL 235   NEUTROS ABS Thousands/µL 3 96     Results from last 7 days   Lab Units 08/27/19  0123   SODIUM mmol/L 139   POTASSIUM mmol/L 4 9   CHLORIDE mmol/L 102   CO2 mmol/L 30   ANION GAP mmol/L 7   BUN mg/dL 24   CREATININE mg/dL 0 82   EGFR ml/min/1 73sq m 64   CALCIUM mg/dL 9 4     Results from last 7 days   Lab Units 08/27/19  0123   AST U/L 39   ALT U/L 34   ALK PHOS U/L 88   TOTAL PROTEIN g/dL 7 4   ALBUMIN g/dL 3 6   TOTAL BILIRUBIN mg/dL 0 65     Results from last 7 days   Lab Units 08/27/19  0123   GLUCOSE RANDOM mg/dL 127     Results from last 7 days   Lab Units 08/27/19  0123   CK TOTAL U/L 98     Results from last 7 days   Lab Units 08/27/19  0123   TROPONIN I ng/mL <0 02     Results from last 7 days   Lab Units 08/27/19  0123   PROTIME seconds 13 1   INR  1 03   PTT seconds 27     Results from last 7 days   Lab Units 08/27/19  0123   TSH 3RD GENERATON uIU/mL 2 500 Results from last 7 days   Lab Units 19  0123   LIPASE u/L 53*     Results from last 7 days   Lab Units 19  0208 19  0207   CLARITY UA   --  Clear   COLOR UA  tito Tito   SPEC GRAV UA   --  1 015   PH UA   --  7 0   GLUCOSE UA mg/dl  --  Negative   KETONES UA mg/dl  --  15 (1+)*   BLOOD UA   --  Trace*   PROTEIN UA mg/dl  --  Negative   NITRITE UA   --  Positive*   BILIRUBIN UA   --  Negative   UROBILINOGEN UA E U /dl  --  0 2   LEUKOCYTES UA   --  Small*   WBC UA /hpf  --  4-10*   RBC UA /hpf  --  None Seen   BACTERIA UA /hpf  --  Occasional   EPITHELIAL CELLS WET PREP /hpf  --  Occasional     ED Treatment:   Medication Administration from 2019 0046 to 2019 0448    Date/Time Order Dose Route Action   2019 0145 sodium chloride 0 9 % bolus 500 mL 500 mL Intravenous New Bag   2019 0255 carbidopa-levodopa (SINEMET CR)  mg per ER tablet 1 tablet 1 tablet Oral Given   2019 0256 entacapone (COMTAN) tablet 200 mg 200 mg Oral Given   2019 0253 PARoxetine (PAXIL) tablet 20 mg 20 mg Oral Given   2019 0352 ceftriaxone (ROCEPHIN) 1 g/50 mL in dextrose IVPB 1,000 mg Intravenous New Bag        Past Medical History:   Diagnosis Date    Anxiety     Dementia     Depression     Gastric ulcer     H/O:  section     HLD (hyperlipidemia) 2016    Hypercholesteremia     [facility] [address] [address]     Raynaud's disease      Present on Admission:   Parkinson disease, symptomatic ([facility] [address]   Depression with anxiety    Admitting Diagnosis: Parkinson disease, symptomatic ([facility] [address] ) [G20]  UTI (urinary tract infection) [N39 0]  Altered mental status [R41 82]  Closed displaced fracture of acromial end of right clavicle with nonunion, subsequent encounter Carolyne Pak     Age/Sex: 80 y o  female     Admission Orders:    Current Facility-Administered Medications:  acetaminophen 975 mg Oral Q8H [facility] & long-term    carbidopa-levodopa 1 tablet Oral 5x Daily    [START ON 2019] cefTRIAXone 1,000 mg Intravenous Q24H    cholecalciferol 400 Units Oral Daily    enoxaparin 40 mg Subcutaneous Daily    entacapone 200 mg Oral 5x Daily    lidocaine 1 patch Topical Daily    multivitamin-minerals 1 tablet Oral Daily    ondansetron 4 mg Intravenous Q6H PRN    polyethylene glycol 17 g Oral Daily    sodium chloride 50 mL/hr Intravenous Continuous Last Rate: 50 mL/hr (08/27/19 3245)     SCDs  DAILY WT  STRAIGHT CATH PRN   UP W/ ASSIST   PROCALCITONIN   URINE CULTURE  CARDIAC DYSPHAGIA DIET   IP CONSULT TO ORTHOPEDIC SURGERY  IP CONSULT TO NEUROLOGY  ST EVAL/TX     Network Utilization Review Department  Phone: [phone]; Fax [phone]  [email or web address]  ATTENTION: Please call with any questions or concerns to [phone]  and carefully listen to the prompts so that you are directed to the right person  Send all requests for admission clinical reviews, approved or denied determinations and any other requests to fax [phone]   All voicemails are confidential

## 2019-08-27 NOTE — ASSESSMENT & PLAN NOTE
Currently the patient is on multiple anti anxiety and depressive medications  However with the patient's tendency for polypharmacy and its presentation of increased somnolence; will put it on hold

## 2019-08-27 NOTE — CONSULTS
Consultation - Neurology   Angelika Falk 80 y o  female MRN: [id number]  Unit/Bed#: [facility] 804-01 Encounter: [id number]      Assessment/Plan   Assessment:  Angelika Falk is a 81 yo F with PMHx of advanced Parkinson's, dementia, and depression/anxiety who presents with altered mental status following prolonged sleep episode, missing daily medications  Plan:  AMS:  Current causes include polypharmacy (tramadol/paroxetine) vs missing daily Parkinson's meds vs UTI   - tramadol given for shoulder pain, ortho saw and recommended CS shot, tramadol discontinued  - consider tapering off paroxetine and finding alternative, discuss with family  - Return to daily Parkinson medication regimen and watch for return to baseline  - Primary team started patient on rocephin     History of Present Illness     Reason for Consult / Principal Problem: altered mental status  Hx and PE limited by: AMS/dementia  HPI: Angelika Falk is a 80 y  o female with PMHx of advanced Parkinson's, dementia, and depression/anxiety who presents with altered mental status following an episode of sleep lasting about 22 hours, during which the patient did not receive medications, food, or water  Patient arrived at ED awake, but unresponsive and not following commands  She had been taking tramadol for shoulder pain the previous 2-3 days according to her son, at a dose of 25 mg TID  Patient was starting on rocephin due to foul smelling urine, concerning for cystitis  Upon seeing her today, she can follow some commands, and is alert and speaking, though slowly and it can be hard to understand her  Her son shared that she started on paroxetine about 1 5 months ago at 10 mg daily, and was recently increased to 20 mg daily, which he believes could be a cause of her current issues  He believes that she is not fully at her baseline currently and remains a bit slow and confused, though at baseline she will not be oriented to time or president   At baseline she is wheelchair bound  She is normally able to swallow well  Patient has  caregiver/aide that assists with all ADLs  Consults    Review of Systems   Unable to perform ROS: Dementia       Historical Information   Past Medical History:   Diagnosis Date    Anxiety     Dementia     Depression     Gastric ulcer     H/O:  section     HLD (hyperlipidemia) 2016    Hypercholesteremia     [facility]'s disease [facility] [address] [facility]     Raynaud's disease      Past Surgical History:   Procedure Laterality Date     SECTION      CHOLECYSTECTOMY      ESOPHAGOGASTRODUODENOSCOPY      KNEE ARTHROSCOPY Left     TX COLONOSCOPY FLX DX W/COLLJ SPEC WHEN PFRMD N/A 3/18/2016    Procedure: COLONOSCOPY;  Surgeon: Jeannie Willoughby MD;  [facility]; Service: Gastroenterology    TX ESOPHAGOGASTRODUODENOSCOPY TRANSORAL DIAGNOSTIC N/A 2016    Procedure: ESOPHAGOGASTRODUODENOSCOPY (EGD); Surgeon: Jeannie Willoughby MD;  [facility]; Service: Gastroenterology    TX OPEN RX FEMUR FX+INTRAMED CHARLEY Right 2017    Procedure: INSERTION NAIL IM FEMUR ANTEGRADE (TROCHANTERIC); Surgeon: Hoang Hess MD;  [facility];  Service: Orthopedics     Social History   Social History     Substance and Sexual Activity   Alcohol Use Never    Alcohol/week: 1 0 standard drinks    Types: 1 Glasses of wine per week    Frequency: Never    Comment: social     Social History     Substance and Sexual Activity   Drug Use No     Social History     Tobacco Use   Smoking Status Never Smoker   Smokeless Tobacco Never Used     Family History: History reviewed  No pertinent family history      Meds/Allergies   current meds:   Current Facility-Administered Medications   Medication Dose Route Frequency    acetaminophen (TYLENOL) tablet 975 mg  975 mg Oral Q8H [address]    carbidopa-levodopa (SINEMET CR)  mg per ER tablet 1 tablet  1 tablet Oral 5x Daily    [START ON 2019] cefTRIAXone (ROCEPHIN) 1,000 mg in dextrose 5 % 50 mL IVPB  1,000 mg Intravenous Q24H    cholecalciferol (VITAMIN D) oral liquid 400 Units  400 Units Oral Daily    enoxaparin (LOVENOX) subcutaneous injection 40 mg  40 mg Subcutaneous Daily    entacapone (COMTAN) tablet 200 mg  200 mg Oral 5x Daily    lidocaine (LIDODERM) 5 % patch 1 patch  1 patch Topical Daily    multivitamin-minerals (CENTRUM) tablet 1 tablet  1 tablet Oral Daily    ondansetron (ZOFRAN) injection 4 mg  4 mg Intravenous Q6H PRN    polyethylene glycol (MIRALAX) packet 17 g  17 g Oral Daily    sodium chloride 0 9 % infusion  50 mL/hr Intravenous Continuous       Allergies   Allergen Reactions    Penicillins      Unknown reaction  Does not remember when last had it       Objective   Vitals:Blood pressure 162/64, pulse 73, temperature 99 1 °F (37 3 °C), resp  rate 17, height 5' 4" (1 626 m), weight 52 9 kg (116 lb 10 oz), SpO2 94 %  ,Body mass index is 20 02 kg/m²  Intake/Output Summary (Last 24 hours) at 8/27/2019 1538  Last data filed at 8/27/2019 1253  Gross per 24 hour   Intake 790 ml   Output 532 ml   Net 258 ml       Invasive Devices: Invasive Devices     Peripheral Intravenous Line            Peripheral IV 08/27/19 Right Wrist less than 1 day                Physical Exam   HENT:   Head: Normocephalic and atraumatic  Mouth/Throat: Oropharynx is clear and moist    Eyes: Pupils are equal, round, and reactive to light  Conjunctivae and EOM are normal    Neck: Neck supple  Cardiovascular: Normal rate and regular rhythm  Pulmonary/Chest: Effort normal and breath sounds normal    Abdominal: Soft  Bowel sounds are normal    Neurological:   Reflex Scores:       Tricep reflexes are 3+ on the right side and 3+ on the left side  Bicep reflexes are 3+ on the right side and 3+ on the left side  Brachioradialis reflexes are 3+ on the right side and 3+ on the left side  Patellar reflexes are 0 on the right side and 0 on the left side         Achilles reflexes are 1+ on the right side and 1+ on the left side  Skin: Skin is warm and dry  Capillary refill takes less than 2 seconds  Neurologic Exam     Mental Status   Oriented to person  Oriented to place  Disoriented to time  Disoriented to year, month and date  Level of consciousness: alert    Cranial Nerves     CN II   Visual fields full to confrontation  CN III, IV, VI   Pupils are equal, round, and reactive to light  Extraocular motions are normal      CN VII   Facial expression full, symmetric  CN XI   CN XI normal    Exam limited by cooperation     Motor Exam   Muscle bulk: decreased  Overall muscle tone: normalDifficult to exam due to patient movement, spontaneously moved all 4 extremities     Sensory Exam   Unable to assess     Gait, Coordination, and Reflexes     Reflexes   Right brachioradialis: 3+  Left brachioradialis: 3+  Right biceps: 3+  Left biceps: 3+  Right triceps: 3+  Left triceps: 3+  Right patellar: 0  Left patellar: 0  Right achilles: 1+  Left achilles: 1+  Right plantar: normal  Left plantar: normal  Right ankle clonus: absent  Left ankle clonus: absentUnable to assess gait and coordination       Lab Results: I have personally reviewed pertinent reports  Imaging Studies: I have personally reviewed pertinent reports  EKG, Pathology, and Other Studies: I have personally reviewed pertinent reports  VTE Prophylaxis: Enoxaparin (Lovenox)    Code Status: Level 3 - DNAR and DNI  Advance Directive and Living Will:      Power of :    POLST:      Counseling / Coordination of Care  Total time spent today 30 minutes  Greater than 50% of total time was spent with the patient and / or family counseling and / or coordination of care   A description of the counseling / coordination of care: chart review, patient evaluation, education

## 2019-08-27 NOTE — ED NOTES
Dr Mohammed Osler at bedside for patient evaluation        Tyronne Klinefelter, LUZ MARIA  08/27/19 9406

## 2019-08-27 NOTE — ASSESSMENT & PLAN NOTE
· Increased confusion and worsening parkinson symtoms s/p fall  · Pt family (son and daughter in law) endorse pt baseline awake, alert, and has much clearer speech than present  · Pt A+OX3 on exam  · Pt drowsy and pt family states that this is not baseline mental status

## 2019-08-27 NOTE — PLAN OF CARE
Problem: Potential for Falls  Goal: Patient will remain free of falls  Description  INTERVENTIONS:  - Assess patient frequently for physical needs  -  Identify cognitive and physical deficits and behaviors that affect risk of falls    -  [place] fall precautions as indicated by assessment   - Educate patient/family on patient safety including physical limitations  - Instruct patient to call for assistance with activity based on assessment  - Modify environment to reduce risk of injury  - Consider OT/PT consult to assist with strengthening/mobility  Outcome: Progressing     Problem: Prexisting or High Potential for Compromised Skin Integrity  Goal: Skin integrity is maintained or improved  Description  INTERVENTIONS:  - Identify patients at risk for skin breakdown  - Assess and monitor skin integrity  - Assess and monitor nutrition and hydration status  - Monitor labs   - Assess for incontinence   - Turn and reposition patient  - Assist with mobility/ambulation  - Relieve pressure over bony prominences  - Avoid friction and shearing  - Provide appropriate hygiene as needed including keeping skin clean and dry  - Evaluate need for skin moisturizer/barrier cream  - Collaborate with interdisciplinary team   - Patient/family teaching  - Consider wound care consult   Outcome: Progressing     Problem: NEUROSENSORY - ADULT  Goal: Achieves stable or improved neurological status  Description  INTERVENTIONS  - Monitor and report changes in neurological status  - Monitor vital signs such as temperature, blood pressure, glucose, and any other labs ordered   - Initiate measures to prevent increased intracranial pressure  - Monitor for seizure activity and implement precautions if appropriate      Outcome: Progressing     Problem: MUSCULOSKELETAL - ADULT  Goal: Maintain or return mobility to safest level of function  Description  INTERVENTIONS:  - Assess patient's ability to carry out ADLs; assess patient's baseline for ADL function and identify physical deficits which impact ability to perform ADLs (bathing, care of mouth/teeth, toileting, grooming, dressing, etc )  - Assess/evaluate cause of self-care deficits   - Assess range of motion  - Assess patient's mobility  - Assess patient's need for assistive devices and provide as appropriate  - Encourage maximum independence but intervene and supervise when necessary  - Involve family in performance of ADLs  - Assess for home care needs following discharge   - Consider OT consult to assist with ADL evaluation and planning for discharge  - Provide patient education as appropriate  Outcome: Progressing     Problem: GENITOURINARY - ADULT  Goal: Absence of urinary retention  Description  INTERVENTIONS:  - Assess patients ability to void and empty bladder  - Monitor I/O  - Bladder scan as needed  - Discuss with physician/AP medications to alleviate retention as needed  - Discuss catheterization for long term situations as appropriate  Outcome: Progressing     Problem: METABOLIC, FLUID AND ELECTROLYTES - ADULT  Goal: Fluid balance maintained  Description  INTERVENTIONS:  - Monitor labs   - Monitor I/O and WT  - Instruct patient on fluid and nutrition as appropriate  - Assess for signs & symptoms of volume excess or deficit  Outcome: Progressing     Problem: SKIN/TISSUE INTEGRITY - ADULT  Goal: Skin integrity remains intact  Description  INTERVENTIONS  - Identify patients at risk for skin breakdown  - Assess and monitor skin integrity  - Assess and monitor nutrition and hydration status  - Monitor labs (i e  albumin)  - Assess for incontinence   - Turn and reposition patient  - Assist with mobility/ambulation  - Relieve pressure over bony prominences  - Avoid friction and shearing  - Provide appropriate hygiene as needed including keeping skin clean and dry  - Evaluate need for skin moisturizer/barrier cream  - Collaborate with interdisciplinary team (i e  Nutrition, Rehabilitation, etc )   - Patient/family teaching  Outcome: Progressing

## 2019-08-27 NOTE — ASSESSMENT & PLAN NOTE
Patient has a recent closed fracture of the right clavicle  She is due to be seen outpatient by Orthopedics this afternoon    Will put referral

## 2019-08-27 NOTE — PLAN OF CARE
Problem: Potential for Falls  Goal: Patient will remain free of falls  Description  INTERVENTIONS:  - Assess patient frequently for physical needs  -  Identify cognitive and physical deficits and behaviors that affect risk of falls    -  [place] fall precautions as indicated by assessment   - Educate patient/family on patient safety including physical limitations  - Instruct patient to call for assistance with activity based on assessment  - Modify environment to reduce risk of injury  - Consider OT/PT consult to assist with strengthening/mobility  Outcome: Progressing     Problem: Prexisting or High Potential for Compromised Skin Integrity  Goal: Skin integrity is maintained or improved  Description  INTERVENTIONS:  - Identify patients at risk for skin breakdown  - Assess and monitor skin integrity  - Assess and monitor nutrition and hydration status  - Monitor labs   - Assess for incontinence   - Turn and reposition patient  - Assist with mobility/ambulation  - Relieve pressure over bony prominences  - Avoid friction and shearing  - Provide appropriate hygiene as needed including keeping skin clean and dry  - Evaluate need for skin moisturizer/barrier cream  - Collaborate with interdisciplinary team   - Patient/family teaching  - Consider wound care consult   Outcome: Progressing     Problem: NEUROSENSORY - ADULT  Goal: Achieves stable or improved neurological status  Description  INTERVENTIONS  - Monitor and report changes in neurological status  - Monitor vital signs such as temperature, blood pressure, glucose, and any other labs ordered   - Initiate measures to prevent increased intracranial pressure  - Monitor for seizure activity and implement precautions if appropriate      Outcome: Progressing     Problem: MUSCULOSKELETAL - ADULT  Goal: Maintain or return mobility to safest level of function  Description  INTERVENTIONS:  - Assess patient's ability to carry out ADLs; assess patient's baseline for ADL function and identify physical deficits which impact ability to perform ADLs (bathing, care of mouth/teeth, toileting, grooming, dressing, etc )  - Assess/evaluate cause of self-care deficits   - Assess range of motion  - Assess patient's mobility  - Assess patient's need for assistive devices and provide as appropriate  - Encourage maximum independence but intervene and supervise when necessary  - Involve family in performance of ADLs  - Assess for home care needs following discharge   - Consider OT consult to assist with ADL evaluation and planning for discharge  - Provide patient education as appropriate  Outcome: Progressing

## 2019-08-27 NOTE — ASSESSMENT & PLAN NOTE
Presumptive cystitis UTI is being thought of  Patient started on Rocephin  Will also administer intravenous fluids at a low rate  In that way, the patient gets adequate hydration was she is unable to take much by mouth  Procalcitonin  Will also get follow-up metabolic profile CBC with differential tomorrow

## 2019-08-28 PROBLEM — N39.0 URINARY TRACT INFECTION: Status: ACTIVE | Noted: 2019-08-27

## 2019-08-28 PROBLEM — I10 HYPERTENSION: Status: ACTIVE | Noted: 2019-08-28
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
## 2019-08-28 LAB
ALBUMIN SERPL BCP-MCNC: 3.5 G/DL (ref 3.5–5)
ALP SERPL-CCNC: 94 U/L (ref 46–116)
ALT SERPL W P-5'-P-CCNC: 8 U/L (ref 12–78)
ANION GAP SERPL CALCULATED.3IONS-SCNC: 5 MMOL/L (ref 4–13)
AST SERPL W P-5'-P-CCNC: 35 U/L (ref 5–45)
BASOPHILS # BLD AUTO: 0.04 THOUSANDS/ΜL (ref 0–0.1)
BASOPHILS NFR BLD AUTO: 1 % (ref 0–1)
BILIRUB SERPL-MCNC: 0.63 MG/DL (ref 0.2–1)
BUN SERPL-MCNC: 15 MG/DL (ref 5–25)
CALCIUM SERPL-MCNC: 9 MG/DL (ref 8.3–10.1)
CHLORIDE SERPL-SCNC: 108 MMOL/L (ref 100–108)
CO2 SERPL-SCNC: 29 MMOL/L (ref 21–32)
CREAT SERPL-MCNC: 0.72 MG/DL (ref 0.6–1.3)
EOSINOPHIL # BLD AUTO: 0.1 THOUSAND/ΜL (ref 0–0.61)
EOSINOPHIL NFR BLD AUTO: 1 % (ref 0–6)
ERYTHROCYTE [DISTWIDTH] IN BLOOD BY AUTOMATED COUNT: 13.5 % (ref 11.6–15.1)
GFR SERPL CREATININE-BSD FRML MDRD: 74 ML/MIN/1.73SQ M
GLUCOSE SERPL-MCNC: 90 MG/DL (ref 65–140)
HCT VFR BLD AUTO: 41.7 % (ref 34.8–46.1)
HGB BLD-MCNC: 13.7 G/DL (ref 11.5–15.4)
IMM GRANULOCYTES # BLD AUTO: 0.02 THOUSAND/UL (ref 0–0.2)
IMM GRANULOCYTES NFR BLD AUTO: 0 % (ref 0–2)
LYMPHOCYTES # BLD AUTO: 1.58 THOUSANDS/ΜL (ref 0.6–4.47)
LYMPHOCYTES NFR BLD AUTO: 21 % (ref 14–44)
MAGNESIUM SERPL-MCNC: 2.1 MG/DL (ref 1.6–2.6)
MCH RBC QN AUTO: 31.2 PG (ref 26.8–34.3)
MCHC RBC AUTO-ENTMCNC: 32.9 G/DL (ref 31.4–37.4)
MCV RBC AUTO: 95 FL (ref 82–98)
MONOCYTES # BLD AUTO: 0.71 THOUSAND/ΜL (ref 0.17–1.22)
MONOCYTES NFR BLD AUTO: 9 % (ref 4–12)
NEUTROPHILS # BLD AUTO: 5.17 THOUSANDS/ΜL (ref 1.85–7.62)
NEUTS SEG NFR BLD AUTO: 68 % (ref 43–75)
NRBC BLD AUTO-RTO: 0 /100 WBCS
PLATELET # BLD AUTO: 204 THOUSANDS/UL (ref 149–390)
PMV BLD AUTO: 11 FL (ref 8.9–12.7)
POTASSIUM SERPL-SCNC: 4.2 MMOL/L (ref 3.5–5.3)
PROCALCITONIN SERPL-MCNC: <0.05 NG/ML
PROT SERPL-MCNC: 7.1 G/DL (ref 6.4–8.2)
RBC # BLD AUTO: 4.39 MILLION/UL (ref 3.81–5.12)
SODIUM SERPL-SCNC: 142 MMOL/L (ref 136–145)
TSH SERPL DL<=0.05 MIU/L-ACNC: 0.87 UIU/ML (ref 0.36–3.74)
WBC # BLD AUTO: 7.62 THOUSAND/UL (ref 4.31–10.16)

## 2019-08-28 PROCEDURE — G8987 SELF CARE CURRENT STATUS: HCPCS

## 2019-08-28 PROCEDURE — G8988 SELF CARE GOAL STATUS: HCPCS

## 2019-08-28 PROCEDURE — G8978 MOBILITY CURRENT STATUS: HCPCS

## 2019-08-28 PROCEDURE — 99232 SBSQ HOSP IP/OBS MODERATE 35: CPT | Performed by: NURSE PRACTITIONER

## 2019-08-28 PROCEDURE — 92526 ORAL FUNCTION THERAPY: CPT

## 2019-08-28 PROCEDURE — 99233 SBSQ HOSP IP/OBS HIGH 50: CPT | Performed by: PSYCHIATRY & NEUROLOGY

## 2019-08-28 PROCEDURE — G8979 MOBILITY GOAL STATUS: HCPCS

## 2019-08-28 PROCEDURE — 83735 ASSAY OF MAGNESIUM: CPT | Performed by: INTERNAL MEDICINE

## 2019-08-28 PROCEDURE — 84145 PROCALCITONIN (PCT): CPT | Performed by: NURSE PRACTITIONER

## 2019-08-28 PROCEDURE — 97163 PT EVAL HIGH COMPLEX 45 MIN: CPT

## 2019-08-28 PROCEDURE — 85025 COMPLETE CBC W/AUTO DIFF WBC: CPT | Performed by: INTERNAL MEDICINE

## 2019-08-28 PROCEDURE — 84443 ASSAY THYROID STIM HORMONE: CPT | Performed by: INTERNAL MEDICINE

## 2019-08-28 PROCEDURE — NC001 PR NO CHARGE: Performed by: ORTHOPAEDIC SURGERY

## 2019-08-28 PROCEDURE — 80053 COMPREHEN METABOLIC PANEL: CPT | Performed by: INTERNAL MEDICINE

## 2019-08-28 PROCEDURE — G8989 SELF CARE D/C STATUS: HCPCS

## 2019-08-28 PROCEDURE — 97167 OT EVAL HIGH COMPLEX 60 MIN: CPT

## 2019-08-28 RX ORDER — BETAMETHASONE SODIUM PHOSPHATE AND BETAMETHASONE ACETATE 3; 3 MG/ML; MG/ML
12 INJECTION, SUSPENSION INTRA-ARTICULAR; INTRALESIONAL; INTRAMUSCULAR; SOFT TISSUE ONCE
Status: COMPLETED | OUTPATIENT
Start: 2019-08-28 | End: 2019-08-28

## 2019-08-28 RX ORDER — BUPIVACAINE HYDROCHLORIDE 2.5 MG/ML
10 INJECTION, SOLUTION EPIDURAL; INFILTRATION; INTRACAUDAL ONCE
Status: COMPLETED | OUTPATIENT
Start: 2019-08-28 | End: 2019-08-28

## 2019-08-28 RX ADMIN — CARBIDOPA AND LEVODOPA 1 TABLET: 50; 200 TABLET, EXTENDED RELEASE ORAL at 14:40

## 2019-08-28 RX ADMIN — ENTACAPONE 200 MG: 200 TABLET ORAL at 12:16

## 2019-08-28 RX ADMIN — ACETAMINOPHEN 975 MG: 325 TABLET ORAL at 14:40

## 2019-08-28 RX ADMIN — ACETAMINOPHEN 975 MG: 325 TABLET ORAL at 22:09

## 2019-08-28 RX ADMIN — CARBIDOPA AND LEVODOPA 1 TABLET: 50; 200 TABLET, EXTENDED RELEASE ORAL at 12:16

## 2019-08-28 RX ADMIN — BETAMETHASONE SODIUM PHOSPHATE AND BETAMETHASONE ACETATE 12 MG: 3; 3 INJECTION, SUSPENSION INTRA-ARTICULAR; INTRALESIONAL; INTRAMUSCULAR at 17:52

## 2019-08-28 RX ADMIN — CARBIDOPA AND LEVODOPA 1 TABLET: 50; 200 TABLET, EXTENDED RELEASE ORAL at 06:15

## 2019-08-28 RX ADMIN — SODIUM CHLORIDE 50 ML/HR: 0.9 INJECTION, SOLUTION INTRAVENOUS at 01:19

## 2019-08-28 RX ADMIN — ENTACAPONE 200 MG: 200 TABLET ORAL at 17:48

## 2019-08-28 RX ADMIN — ENOXAPARIN SODIUM 40 MG: 40 INJECTION SUBCUTANEOUS at 08:47

## 2019-08-28 RX ADMIN — Medication 400 UNITS: at 08:59

## 2019-08-28 RX ADMIN — BUPIVACAINE HYDROCHLORIDE 10 ML: 2.5 INJECTION, SOLUTION EPIDURAL; INFILTRATION; INTRACAUDAL; PERINEURAL at 17:52

## 2019-08-28 RX ADMIN — DEXTRAN 70, AND HYPROMELLOSE 2910 1 DROP: 1; 3 SOLUTION/ DROPS OPHTHALMIC at 16:27

## 2019-08-28 RX ADMIN — ACETAMINOPHEN 975 MG: 325 TABLET ORAL at 06:15

## 2019-08-28 RX ADMIN — LIDOCAINE 1 PATCH: 50 PATCH CUTANEOUS at 08:58

## 2019-08-28 RX ADMIN — CEFTRIAXONE 1000 MG: 1 INJECTION, POWDER, FOR SOLUTION INTRAMUSCULAR; INTRAVENOUS at 04:46

## 2019-08-28 RX ADMIN — CARBIDOPA AND LEVODOPA 1 TABLET: 50; 200 TABLET, EXTENDED RELEASE ORAL at 22:19

## 2019-08-28 RX ADMIN — ENTACAPONE 200 MG: 200 TABLET ORAL at 06:15

## 2019-08-28 RX ADMIN — ENTACAPONE 200 MG: 200 TABLET ORAL at 22:19

## 2019-08-28 RX ADMIN — ENTACAPONE 200 MG: 200 TABLET ORAL at 14:40

## 2019-08-28 RX ADMIN — CARBIDOPA AND LEVODOPA 1 TABLET: 50; 200 TABLET, EXTENDED RELEASE ORAL at 17:48

## 2019-08-28 RX ADMIN — POLYETHYLENE GLYCOL 3350 17 G: 17 POWDER, FOR SOLUTION ORAL at 22:09

## 2019-08-28 RX ADMIN — Medication 1 TABLET: at 08:47

## 2019-08-28 NOTE — SOCIAL WORK
Cm reviewed patient during care coordination rounds with Jocy, SLIM  Patient not stable for discharge today  Patient anticipated for possible discharge in 24-48 hours  PT/OT recommending home therapy upon discharge  Cm met with patient's son and caregiver at bedside who are requesting referral be placed to [address] [place] [address]  for RN, PT, OT, , Speech, & Home Aide services  Referral placed per their choice/request and awaiting determination if able to accept  Cm following and patient's family to transport once stable  A post acute care recommendation was made by your care team for [facility]  Discussed [place] of Choice with patient's son and caregiver  List of agencies given to caregiver via in person  caregiver aware the list is custom filtered for them by preference  and that [facility]'s post acute providers are designated

## 2019-08-28 NOTE — UTILIZATION REVIEW
Initial Clinical Review    Admission: Date/Time/Statement: Observation 8/27 and changed to Inpatient on 8/28 @ 1437  Pt requiring at least 2 MN to continue treatment and care    Inpatient Admission Orders (From admission, onward)     Ordered        08/28/19 1437  Inpatient Admission  Once                    Inpatient Admission     Standing Status:   Standing     Number of Occurrences:   1     Order Specific Question:   Admitting Physician     Answer:   Holland Camacho [[id number]]     Order Specific Question:   Level of Care     Answer:   Med Surg [[id number]]     Order Specific Question:   Estimated length of stay     Answer:   More than 2 Midnights     Order Specific Question:   Certification     Answer:   I certify that inpatient services are medically necessary for this patient for a duration of greater than two midnights  See H&P and MD Progress Notes for additional information about the patient's course of treatment  ED Arrival Information     Expected Arrival Acuity Means of Arrival Escorted By Service Admission Type    - 8/27/2019 00:46 Urgent Ambulance [place] Hospitalist Urgent    Arrival Complaint    Altered Mental Status        Chief Complaint   Patient presents with    Altered Mental Status     Pt presents from home where last night (8/26/19) her caregivers noticed her acting differently  Called tonight after tonight she started acting off her normal as well  Pt baseline is a GCS 14 with advanced parkinson's  EMS found sugar to be 56, given 200 D10 , retaken 120     Assessment/Plan:   Leanna Hutchinson is a 80 y o  female who has a past medical history significant for Parkinson disease and takes parkinsonian medications 5 times daily  She also has a history of dementia as well as depression with anxiety  Noted is that medication list is full of medications better able to induce somnolence  That said, she recently had a right clavicular fracture resulting from a fall    She is due to be seen by Orthopedics  But meanwhile, the patient was placed on Ultram by her primary physician who visits her at home  The patient has been sleeping it seems for the past 24 hours  She has not eaten or taken any of her medications  She was seen to be last awake approximately 1 in the morning on Sunday and she was still sleeping as of past midnight  Her primary care physician who does house visits went to her place and according to the caretaker who is with her the assessment was possibility of polypharmacy affecting her wakefulness especially with the use of Ultram as part of recent medications  Currently the patient is alert and awake however she is not communicative  She shaking and regards at the examiner but would not follow commands  There are times that she would attempt to do so but is quite unsuccessful  According to the caretaker these are symptoms that are reminiscent of worsening parkinsonian symptoms should she not take her medication  That said, the patient is placed in hospital in order for her to get her Parkinson's meds and at least get Neurology to see her  Also, she was found to have foul smelling urine and a questionable cystitis  She started on Rocephin  Meanwhile, she is supposed to see her orthopedic surgeon this afternoon ([facility] however caretaker does not know the name)  Will put a referral in  She has mild pain upon extension of her arm especially forced on the right side  GCS = 14 X 2      8/27 AFTERNOON UPDATE - NEUROLOGY CONSULT   Royal Olivo is a 81 yo F with PMHx of advanced Parkinson's, dementia, and depression/anxiety who presents with altered mental status following prolonged sleep episode, missing daily medications    Plan:  AMS:  Current causes include polypharmacy (tramadol/paroxetine) vs missing daily Parkinson's meds vs UTI   - tramadol given for shoulder pain, ortho saw and recommended CS shot, tramadol discontinued  - consider tapering off paroxetine and finding alternative, discuss with family  - Return to daily Parkinson medication regimen and watch for return to baseline  - Primary team started patient on rocephin      8/27 Amirah Min -  80 y  o female with right shoulder pain with atraumatic etiology       Plan:   · WBAT RUE  · Will discuss with family R shoulder CS injection later today  · Dispo: Ortho signing off after discussion with family  +++++++++++++++++++++++++++++++++++++++++++++++++++++++++++  * Parkinson disease, symptomatic ([facility] [address] )  Assessment & Plan  Currently the patient seems to have exacerbation of parkinsonian symptoms not having taken her medications for the past 24 hours which is dosed at 5 times a day  Currently she can take the medications by mouth albeit slowly  Will continue observation  Will also get Neurology to see patient  Meanwhile as patient is unable take much by mouth; will place patient on gentle IV hydration      Polypharmacy  Assessment & Plan  The patient was placed on Ultram as per history for left shoulder pain  The patient has a follow-up with Orthopedics regarding a recent humeral fracture  Will continue referral with Orthopedics      Acute cystitis without hematuria  Assessment & Plan  Presumptive cystitis UTI is being thought of  Patient started on Rocephin  Will also administer intravenous fluids at a low rate  In that way, the patient gets adequate hydration was she is unable to take much by mouth  Procalcitonin  Will also get follow-up metabolic profile CBC with differential tomorrow      Depression with anxiety  Assessment & Plan  Currently the patient is on multiple anti anxiety and depressive medications  However with the patient's tendency for polypharmacy and its presentation of increased somnolence; will put it on hold      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  Assessment & Plan  Patient has a recent closed fracture of the right clavicle    She is due to be seen outpatient by Orthopedics this afternoon  Will put referral       VTE Prophylaxis: Enoxaparin (Lovenox)  / sequential compression device   Code Status: Prior do not resuscitate as per [facility]    ED Triage Vitals [08/27/19 0053]   Temperature Pulse Respirations Blood Pressure SpO2   98 7 °F (37 1 °C) (!) 142 18 153/84 98 %      Temp Source Heart Rate Source Patient Position - Orthostatic VS BP Location FiO2 (%)   Oral Monitor Lying Right arm --      Pain Score       No Pain          Wt Readings from Last 1 Encounters:   08/28/19 51 4 kg (113 lb 6 4 oz)     Additional Vital Signs:   08/27/19 07:12:36  98 3 °F (36 8 °C)  64  16  165/67  100  96 %     08/27/19 0503        178/66Abnormal          08/27/19 04:58:10    59  18      98 %     08/27/19 0400    58  22  169/75    96 %  None (Room air)   08/27/19 0330    56  22  156/66    94 %  None (Room air)   08/27/19 0215    58  22  174/80Abnormal     98 %  None (Room air)   08/27/19 0056    70               Pertinent Labs/Diagnostic Test Results:     8/27 XRAY R HUMERUS - No acute osseous abnormality  8/27 CXR - No acute cardiopulmonary disease  8/27 CT HEAD - 1  No acute intracranial hemorrhage, midline shift, or mass effect  2   Chronic small vessel stomach changes  8/27 XRAY R SHOULDER - No acute osseous abnormality      8/27 ECG - Normal sinus rhythm  Right bundle branch block  Abnormal ECG  When compared with ECG of 17-AUG-2019 09:31,  No significant change was found    Results from last 7 days   Lab Units 08/28/19 0442 08/27/19  0123   WBC Thousand/uL 7 62 6 10   HEMOGLOBIN g/dL 13 7 14 6   HEMATOCRIT % 41 7 44 7   PLATELETS Thousands/uL 204 235   NEUTROS ABS Thousands/µL 5 17 3 96     Results from last 7 days   Lab Units 08/28/19  0442 08/27/19  0123   SODIUM mmol/L 142 139   POTASSIUM mmol/L 4 2 4 9   CHLORIDE mmol/L 108 102   CO2 mmol/L 29 30   ANION GAP mmol/L 5 7   BUN mg/dL 15 24   CREATININE mg/dL 0 72 0 82   EGFR ml/min/1 73sq m 74 64 CALCIUM mg/dL 9 0 9 4   MAGNESIUM mg/dL 2 1  --      Results from last 7 days   Lab Units 08/28/19  0442 08/27/19  0123   AST U/L 35 39   ALT U/L 8* 34   ALK PHOS U/L 94 88   TOTAL PROTEIN g/dL 7 1 7 4   ALBUMIN g/dL 3 5 3 6   TOTAL BILIRUBIN mg/dL 0 63 0 65     Results from last 7 days   Lab Units 08/28/19  0442 08/27/19  0123   GLUCOSE RANDOM mg/dL 90 127     Results from last 7 days   Lab Units 08/27/19  0123   CK TOTAL U/L 98     Results from last 7 days   Lab Units 08/27/19  0123   TROPONIN I ng/mL <0 02     Results from last 7 days   Lab Units 08/27/19  0123   PROTIME seconds 13 1   INR  1 03   PTT seconds 27     Results from last 7 days   Lab Units 08/28/19  0442 08/27/19  0123   TSH 3RD GENERATON uIU/mL 0 867 2 500     Results from last 7 days   Lab Units 08/27/19  0123   LIPASE u/L 53*     Results from last 7 days   Lab Units 08/27/19  0208 08/27/19  0207   CLARITY UA   --  Clear   COLOR UA  tito Tito   SPEC GRAV UA   --  1 015   PH UA   --  7 0   GLUCOSE UA mg/dl  --  Negative   KETONES UA mg/dl  --  15 (1+)*   BLOOD UA   --  Trace*   PROTEIN UA mg/dl  --  Negative   NITRITE UA   --  Positive*   BILIRUBIN UA   --  Negative   UROBILINOGEN UA E U /dl  --  0 2   LEUKOCYTES UA   --  Small*   WBC UA /hpf  --  4-10*   RBC UA /hpf  --  None Seen   BACTERIA UA /hpf  --  Occasional   EPITHELIAL CELLS WET PREP /hpf  --  Occasional     ED Treatment:   Medication Administration from 08/27/2019 0046 to 08/27/2019 0448    Date/Time Order Dose Route Action   08/27/2019 0145 sodium chloride 0 9 % bolus 500 mL 500 mL Intravenous New Bag   08/27/2019 0255 carbidopa-levodopa (SINEMET CR)  mg per ER tablet 1 tablet 1 tablet Oral Given   08/27/2019 0256 entacapone (COMTAN) tablet 200 mg 200 mg Oral Given   08/27/2019 0253 PARoxetine (PAXIL) tablet 20 mg 20 mg Oral Given   08/27/2019 0352 ceftriaxone (ROCEPHIN) 1 g/50 mL in dextrose IVPB 1,000 mg Intravenous New Bag        Past Medical History:   Diagnosis Date    Anxiety     Dementia     Depression     Gastric ulcer     H/O:  section     HLD (hyperlipidemia) 2016    Hypercholesteremia     Parkinson's disease ([facility] [address] )     Raynaud's disease      Present on Admission:   [facility]   Depression with anxiety   Fall   Hypertension    Admitting Diagnosis: Parkinson disease, symptomatic ([facility] [address] ) [G20]  UTI (urinary tract infection) [N39 0]  Altered mental status [R41 82]  Closed displaced fracture of acromial end of right clavicle with nonunion, subsequent encounter [S42 031K]     Age/Sex: 80 y o  female     Admission Orders:    Current Facility-Administered Medications:  acetaminophen 975 mg Oral Q8H [facility]    carbidopa-levodopa 1 tablet Oral 5x Daily    [START ON 2019] cefTRIAXone 1,000 mg Intravenous Q24H    cholecalciferol 400 Units Oral Daily    enoxaparin 40 mg Subcutaneous Daily    entacapone 200 mg Oral 5x Daily    lidocaine 1 patch Topical Daily    multivitamin-minerals 1 tablet Oral Daily    ondansetron 4 mg Intravenous Q6H PRN    polyethylene glycol 17 g Oral Daily    sodium chloride 50 mL/hr Intravenous Continuous Last Rate: 50 mL/hr (19 0455)     SCDs  DAILY WT  STRAIGHT CATH PRN   UP W/ ASSIST   PROCALCITONIN   URINE CULTURE  CARDIAC DYSPHAGIA DIET   IP CONSULT TO ORTHOPEDIC SURGERY  IP CONSULT TO NEUROLOGY  [facility]/[place]     Network Utilization Review Department  Phone: [phone]; Fax [phone]  [email or web address]  ATTENTION: Please call with any questions or concerns to [phone]  and carefully listen to the prompts so that you are directed to the right person  Send all requests for admission clinical reviews, approved or denied determinations and any other requests to fax [phone]   All voicemails are confidential

## 2019-08-28 NOTE — PROGRESS NOTES
Progress Note - Vika Kong 5/8/1930, 80 y o  female MRN: [id number]    Unit/Bed#: [facility] 804-01 Encounter: [id number]    Primary Care Provider: Alexys Bauer MD   Date and time admitted to hospital: 8/27/2019 12:47 AM        * Parkinson disease, symptomatic ([facility] [address] )  Assessment & Plan  · Increased confusion and worsening parkinson symtoms s/p fall  · Pt family (son and daughter in law) endorse pt baseline awake, alert, and has much clearer speech than present  · Pt A+OX3 on exam  · Pt drowsy and pt family states that this is not baseline mental status    Fall  Assessment & Plan  · Pt complains of shoulder pain in ED  · Shoulder XR: no acute fracture or dislocation  Mild degenerative arthritis noted at the acromioclavicular joint  No lytic or blastic lesions are seen  Soft tissues are unremarkable  · Ortho followed and recommended injection  · Spoke with family regarding completion during inpatient stay      Urinary tract infection  Assessment & Plan  · POA: UA positive for infection; IV rocephin started  · Urine cx pending  · Spoke with lab for preliminary report; slow growing will need to follow up tomorrow  · No fever or elevated WBC     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  Assessment & Plan  · Patient has a recent closed fracture of the right clavicle  · otho followed today   · Potential injection during hospital stay    Polypharmacy  Assessment & Plan  · Tramadol d/c due to patient population and potential for increased confusion  · Will follow up with outpatient neurologist for titration of current med list for Parkisons     Depression with anxiety  Assessment & Plan  · Currently the patient is on multiple anti anxiety and depressive medications      · However with the patient's tendency for polypharmacy and its presentation of increased somnolence  · Pt family reports noticing a change since addition of Paxyl to regimen, on hold at this time    VTE Pharmacologic Prophylaxis: Pharmacologic: Enoxaparin (Lovenox)  Mechanical VTE Prophylaxis in Place: Yes    Patient Centered Rounds: I have performed bedside rounds with nursing staff today  Discussions with Specialists or Other Care Team Provider: speech pathology, nursing staff, case management    Education and Discussions with Family / Patient: discussion at length with daughter and son at the bedside regarding inpatient management of care    Time Spent for Care: 30 minutes  More than 50% of total time spent on counseling and coordination of care as described above  Current Length of Stay: 0 day(s)    Current Patient Status: Inpatient   Certification Statement: The patient will continue to require additional inpatient hospital stay due to further inpatient work up and treatment    Discharge Plan: likely discharge in the next 24-48hours pending current work up and improvement of symptomatology    Code Status: Level 3 - DNAR and DNI      Subjective:   Pt denies pain of shoulder, stomach, and generally "feels better"    Objective:     Vitals:   Temp (24hrs), Av 6 °F (37 °C), Min:97 3 °F (36 3 °C), Max:99 1 °F (37 3 °C)    Temp:  [97 3 °F (36 3 °C)-99 1 °F (37 3 °C)] 97 3 °F (36 3 °C)  HR:  [64-74] 64  Resp:  [17-18] 18  BP: (160-163)/(58-65) 160/58  SpO2:  [93 %-94 %] 94 %  Body mass index is 19 47 kg/m²  Input and Output Summary (last 24 hours): Intake/Output Summary (Last 24 hours) at 2019 1452  Last data filed at 2019 1436  Gross per 24 hour   Intake 2188 34 ml   Output 892 ml   Net 1296 34 ml       Physical Exam:     Physical Exam   HENT:   Head: Normocephalic  Eyes: Pupils are equal, round, and reactive to light  Cardiovascular: Normal rate and regular rhythm  Pulmonary/Chest: Effort normal  No respiratory distress  Abdominal: Soft  Bowel sounds are normal  She exhibits no distension  There is no tenderness  Musculoskeletal:   Limited ROM   Neurological: She is alert     Skin: Skin is warm and dry  Capillary refill takes less than 2 seconds  Psychiatric: She has a normal mood and affect  Her behavior is normal  Judgment normal          Additional Data:     Labs:    Results from last 7 days   Lab Units 08/28/19  0442   WBC Thousand/uL 7 62   HEMOGLOBIN g/dL 13 7   HEMATOCRIT % 41 7   PLATELETS Thousands/uL 204   NEUTROS PCT % 68   LYMPHS PCT % 21   MONOS PCT % 9   EOS PCT % 1     Results from last 7 days   Lab Units 08/28/19  0442   SODIUM mmol/L 142   POTASSIUM mmol/L 4 2   CHLORIDE mmol/L 108   CO2 mmol/L 29   BUN mg/dL 15   CREATININE mg/dL 0 72   ANION GAP mmol/L 5   CALCIUM mg/dL 9 0   ALBUMIN g/dL 3 5   TOTAL BILIRUBIN mg/dL 0 63   ALK PHOS U/L 94   ALT U/L 8*   AST U/L 35   GLUCOSE RANDOM mg/dL 90     Results from last 7 days   Lab Units 08/27/19  0123   INR  1 03             Results from last 7 days   Lab Units 08/28/19  0442   PROCALCITONIN ng/ml <0 05           * I Have Reviewed All Lab Data Listed Above  * Additional Pertinent Lab Tests Reviewed:  [facility] 66 Admission Reviewed    Imaging:    Imaging Reports Reviewed Today Include: shoulder XR      Recent Cultures (last 7 days):     Results from last 7 days   Lab Units 08/27/19  1351   URINE CULTURE  Culture too young- will reincubate       Last 24 Hours Medication List:     Current Facility-Administered Medications:  acetaminophen 975 mg Oral [facility] HUNTER Grider    carbidopa-levodopa 1 tablet Oral 5x Daily Monroe Euceda MD    cefTRIAXone 1,000 mg Intravenous Q24H Monroe Euceda MD Last Rate: Stopped (08/28/19 0516)   cholecalciferol 400 Units Oral Daily Monroe Euceda MD    dextran 70-hypromellose 1 drop Both Eyes PRN HUNTER Molina    enoxaparin 40 mg Subcutaneous Daily Monroe Euceda MD    entacapone 200 mg Oral 5x Daily Monroe Euceda MD    lidocaine 1 patch Topical Daily HUNTER Schwartz    multivitamin-minerals 1 tablet Oral Daily Monroe Euceda MD    ondansetron 4 mg Intravenous Q6H PRN Monroe Euceda MD    polyethylene glycol 17 g Oral Daily HUNTER Grider         Today, Patient Was Seen By: HUNTER Chu    ** Please Note: Dictation voice to text software may have been used in the creation of this document   **

## 2019-08-28 NOTE — PLAN OF CARE
Problem: PHYSICAL THERAPY ADULT  Goal: Performs mobility at highest level of function for planned discharge setting  See evaluation for individualized goals  Description  Treatment/Interventions: Functional transfer training, LE strengthening/ROM, Therapeutic exercise, Endurance training, Patient/family training, Equipment eval/education, Bed mobility, Gait training, OT, Spoke to case management, Spoke to nursing  Equipment Recommended: (HAS ALL NECESSARY DME )       See flowsheet documentation for full assessment, interventions and recommendations  Note:   Prognosis: Guarded  Problem List: Decreased strength, Decreased endurance, Impaired balance, Decreased mobility, Decreased cognition, Impaired judgement, Decreased safety awareness, Impaired hearing  Assessment: PT COMPLETED EVALUATION OF 80YEAR OLD FEMALE AD[facility]ITTED [facility] ON 8/27/17 S/P FALL WITH EXACERBATION OF PARKINSON DISEASE SYMPTOMS  CURRENT DIAGNOSES INCLUDE SYMPTOMATIC PARKINSON DISEASE, FALL (COMPLAINS OF R SHOULDER PAIN, IMAGING (-), ORTHO REPORTS "WBAT R UE" AND IS RECOMMENDING CS INJECTION), AND UTI  CURRENT STATUS INSTABILITIES INCLUDE PAIN, CONTINUOUS O2/HR MONITORING, FALLS RISK, AND BED/CHAIR ALARMS  PMH IS SIGNIFICANT FOR PD, DEMENTIA (PER FAMILY NORMALLY ORIENTED TO PEOPLE AND PLACES), RAYNAUDS DISEASE, DEPRESSION, AND FEMOR FRACTURE (02/2017)  PER CAREGIVER PRESENT DURING EVALUATION PATIENT RESIDES IN HER OWN HOME WITH 24/7 CAREGIVER ASSISTANCE  AT TIMES THERE ARE TWO AIDS DURING WHICH THEY WILL TRY AND AMBULATE WITH PATIENT AS AN AX2 WITH A RW HOWEVER NORMALLY THERE IS JUST ONE AIDE AND SO PATIENT PARTICIPATES IN STAND PIVOT TRANSFERS (BED<-->CHAIR<-->COMMODE) WITH MAXIMAL ASSISTANCE  PATIENT RECEIVES ASSISTANCE WITH ALL ADLS  PER CAREGIVER, SHE IS CONCERNED THAT R UE PAIN MAY BE DUE TO HOWEVER PATIENT HAS BEEN TRANSFERRED AND EXPRESSES QUESTIONS ABOUT HOW TO TRANSFER PATIENT AS AN AX1 WITHOUT USING ARMS   PT DISCUSSED OPTIONS INCLUDING USE OF GAIT BELT AND ALSO KEEPING HALF FOLDED SHEET OR TOWEL UNDER PATIENT'S BOTTOM AS SLING FOR PERFORMANCE OF TRANSFERS  DURING PT EVALUATION CAREGIVER PRESENT FOR OBSERVATION  PATIENT DID NOT FOLLOW COMMANDS AND REQUIRED MAX-AX2 TO PERFORM SUPINE-->SIT TRANSFER AND MOD-AX1 TO MAINTAIN STATIC SITTING  PATIENT WAS TRANSFERRED FROM BED TO DROP ARM CHAIR VIA SIT PIVOT TRANSFER WITH USE OF BED PAD UNDER PATIENT'S BOTTOM, DEPENDENT AX1 WITH THERAPIST POSITIONED IN FRONT OF PATIENT AND SBA OF SECOND PERSON  IN FUTURE SESSIONS PLAN TO PROGRESS PATIENT'S ABILITY TO PARTICIPATE IN TRANSFERS AND PROVIDE EDUCATION TO CAREGIVER FOR CARRYOVER UPON D/C  PER SON ALSO PRESENT DURING EVALUATION, HE ONLY WISHES FOR PATIENT TO RETURN HOME WITH HOME PT/OT SERVICES AND ANY INCREASED ASSISTANCE THAT MAY BE PROVIDED WITH AIDS (IE  HAVING TWO AIDS THERE MORE OFTEN)  PT SPOKE WITH CM REGARDING FAMILY'S REQUESTS  PT D/C RECOMMENDATION IS FOR HOME WITH CONTINUED 24 HOUR CARE AND HOME SERVICES  PATIENT WILL BENEFIT FROM CONTINUED SKILLED PT THIS ADMISSION TO ACHIEVE MAXIMAL FUNCTION AND SAFETY  Recommendation: (S) Home with family support, 24 hour supervision/assist, Home PT     PT - OK to Discharge: (S) Yes(HOME W/ PRIOR LEVEL OF CARE + HHPT WHEN MED JR )    See flowsheet documentation for full assessment

## 2019-08-28 NOTE — ASSESSMENT & PLAN NOTE
· Patient has a recent closed fracture of the right clavicle      · otho followed today   · Potential injection during hospital stay

## 2019-08-28 NOTE — ASSESSMENT & PLAN NOTE
· Pt complains of shoulder pain in ED  · Shoulder XR: no acute fracture or dislocation  Mild degenerative arthritis noted at the acromioclavicular joint  No lytic or blastic lesions are seen  Soft tissues are unremarkable    · Ortho followed and recommended injection  · Spoke with family regarding completion during inpatient stay

## 2019-08-28 NOTE — CONSULTS
Please see consult from Medical student Paramjit Miles and Neurologist Dr Keyur Bush on 8/27/19 at 337 pm

## 2019-08-28 NOTE — PLAN OF CARE
Problem: Potential for Falls  Goal: Patient will remain free of falls  Description  INTERVENTIONS:  - Assess patient frequently for physical needs  -  Identify cognitive and physical deficits and behaviors that affect risk of falls    -  Braham fall precautions as indicated by assessment   - Educate patient/family on patient safety including physical limitations  - Instruct patient to call for assistance with activity based on assessment  - Modify environment to reduce risk of injury  - Consider OT/PT consult to assist with strengthening/mobility  Outcome: Progressing     Problem: Prexisting or High Potential for Compromised Skin Integrity  Goal: Skin integrity is maintained or improved  Description  INTERVENTIONS:  - Identify patients at risk for skin breakdown  - Assess and monitor skin integrity  - Assess and monitor nutrition and hydration status  - Monitor labs   - Assess for incontinence   - Turn and reposition patient  - Assist with mobility/ambulation  - Relieve pressure over bony prominences  - Avoid friction and shearing  - Provide appropriate hygiene as needed including keeping skin clean and dry  - Evaluate need for skin moisturizer/barrier cream  - Collaborate with interdisciplinary team   - Patient/family teaching  - Consider wound care consult   Outcome: Progressing     Problem: NEUROSENSORY - ADULT  Goal: Achieves stable or improved neurological status  Description  INTERVENTIONS  - Monitor and report changes in neurological status  - Monitor vital signs such as temperature, blood pressure, glucose, and any other labs ordered   - Initiate measures to prevent increased intracranial pressure  - Monitor for seizure activity and implement precautions if appropriate      Outcome: Progressing     Problem: MUSCULOSKELETAL - ADULT  Goal: Maintain or return mobility to safest level of function  Description  INTERVENTIONS:  - Assess patient's ability to carry out ADLs; assess patient's baseline for ADL function and identify physical deficits which impact ability to perform ADLs (bathing, care of mouth/teeth, toileting, grooming, dressing, etc )  - Assess/evaluate cause of self-care deficits   - Assess range of motion  - Assess patient's mobility  - Assess patient's need for assistive devices and provide as appropriate  - Encourage maximum independence but intervene and supervise when necessary  - Involve family in performance of ADLs  - Assess for home care needs following discharge   - Consider OT consult to assist with ADL evaluation and planning for discharge  - Provide patient education as appropriate  Outcome: Progressing     Problem: GENITOURINARY - ADULT  Goal: Absence of urinary retention  Description  INTERVENTIONS:  - Assess patients ability to void and empty bladder  - Monitor I/O  - Bladder scan as needed  - Discuss with physician/AP medications to alleviate retention as needed  - Discuss catheterization for long term situations as appropriate  Outcome: Progressing     Problem: METABOLIC, FLUID AND ELECTROLYTES - ADULT  Goal: Fluid balance maintained  Description  INTERVENTIONS:  - Monitor labs   - Monitor I/O and WT  - Instruct patient on fluid and nutrition as appropriate  - Assess for signs & symptoms of volume excess or deficit  Outcome: Progressing     Problem: SKIN/TISSUE INTEGRITY - ADULT  Goal: Skin integrity remains intact  Description  INTERVENTIONS  - Identify patients at risk for skin breakdown  - Assess and monitor skin integrity  - Assess and monitor nutrition and hydration status  - Monitor labs (i e  albumin)  - Assess for incontinence   - Turn and reposition patient  - Assist with mobility/ambulation  - Relieve pressure over bony prominences  - Avoid friction and shearing  - Provide appropriate hygiene as needed including keeping skin clean and dry  - Evaluate need for skin moisturizer/barrier cream  - Collaborate with interdisciplinary team (i e  Nutrition, Rehabilitation, etc )   - Patient/family teaching  Outcome: Progressing

## 2019-08-28 NOTE — PHYSICIAN ADVISOR
Current patient class: Observation  The patient is currently on Hospital Day: 2 at [address] [place] [address]      This patient was originally admitted to the hospital under observation status  After admission the patient was reevaluated and determined to require further hospitalization  The patient is now documented to require at least a 2nd midnight in the hospital  As such the patient is now expected to satisfy the 2 midnight benchmark and is therefore eligible for inpatient admission  After review of the relevant documentation, labs, vital signs and test results, the patient is appropriate for INPATIENT ADMISSION  Admission to the hospital as an inpatient is a complex decision making process which requires the practitioner to consider the patients presenting complaint, history and physical examination and all relevant testing  With this in mind, in this case, the patient was deemed appropriate for INPATIENT ADMISSION  After review of the documentation and testing available at the time of the admission I concur with this clinical determination of medical necessity  Rationale is as follows: The patient is an 77-year-old female with Parkinson's disease who presented to the hospital on August 27, 2019 with toxic metabolic encephalopathy  This was felt to be due in part to polypharmacy and tramadol for left shoulder pain  She was also diagnosed with presumptive urinary tract infection and was started on intravenous Rocephin  Intravenous fluids were also ordered  The patient has improved somewhat but still seems confused  She is expected to remain in the hospital for continued care  She will remain on antibiotics, fluids, and Orthopedics is considering a therapeutic/diagnostic injection to help relieve symptoms and avoid medications that may contribute to encephalopathy  At this point the patient is appropriate for change to inpatient class      The patients vitals on arrival were ED Triage Vitals [19 0053]   Temperature Pulse Respirations Blood Pressure SpO2   98 7 °F (37 1 °C) (!) 142 18 153/84 98 %      Temp Source Heart Rate Source Patient Position - Orthostatic VS BP Location FiO2 (%)   Oral Monitor Lying Right arm --      Pain Score       No Pain           Past Medical History:   Diagnosis Date    Anxiety     Dementia     Depression     Gastric ulcer     H/O:  section     HLD (hyperlipidemia) 2016    Hypercholesteremia     [facility]'s disease ([facility] )     Raynaud's disease      Past Surgical History:   Procedure Laterality Date     SECTION      CHOLECYSTECTOMY      ESOPHAGOGASTRODUODENOSCOPY      KNEE ARTHROSCOPY Left     [place] COLONOSCOPY FLX DX W/COLLJ SPEC WHEN PFRMD N/A 3/18/2016    Procedure: COLONOSCOPY;  Surgeon: Belle Moore MD;  [facility]; Service: Gastroenterology    UT ESOPHAGOGASTRODUODENOSCOPY TRANSORAL DIAGNOSTIC N/A 2016    Procedure: ESOPHAGOGASTRODUODENOSCOPY (EGD); Surgeon: Belle Moore MD;  [facility]; Service: Gastroenterology    UT OPEN RX FEMUR FX+INTRAMED CHARLEY Right 2017    Procedure: INSERTION NAIL IM FEMUR ANTEGRADE (TROCHANTERIC);   Surgeon: Richie Edouard MD;  [facility];  Service: Orthopedics       The patient was admitted to the hospital at N/A on N/A for the following diagnosis:  Parkinson disease, symptomatic (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, subsequent encounter [S42 031K]    Consults have been placed to:   IP CONSULT TO Bhavin Wang 187:    19 1530 19 2323 19 0600 19 0700   BP: 162/64 161/62  160/58   BP Location:       Pulse: 73 68  64   Resp:  17  18   Temp: 99 1 °F (37 3 °C) 98 8 °F (37 1 °C)  (!) 97 3 °F (36 3 °C)   TempSrc:    Axillary   SpO2: 94% 93%  94%   Weight:   51 4 kg (113 lb 6 4 oz)    Height:           Most recent labs:    Recent Labs     08/27/19  0123 08/28/19  0442   WBC 6 10 7 62   HGB 14 6 13 7   HCT 44 7 41 7    204   K 4 9 4 2   CALCIUM 9 4 9 0   BUN 24 15   CREATININE 0 82 0 72   LIPASE 53*  --    INR 1 03  --    TROPONINI <0 02  --    CKTOTAL 98  --    AST 39 35   ALT 34 8*   ALKPHOS 88 94       Scheduled Meds:  Current Facility-Administered Medications:  acetaminophen 975 mg Oral [facility] HUNTER Grider    carbidopa-levodopa 1 tablet Oral 5x Daily Monroe Euceda MD    cefTRIAXone 1,000 mg Intravenous Q24H Monroe Euceda MD Last Rate: Stopped (08/28/19 0516)   cholecalciferol 400 Units Oral Daily Monroe Euceda MD    dextran 70-hypromellose 1 drop Both Eyes PRN HUNTER Molina    enoxaparin 40 mg Subcutaneous Daily Monroe Euceda MD    entacapone 200 mg Oral 5x Daily Monroe Euceda MD    lidocaine 1 patch Topical Daily HUNTER Schwartz    multivitamin-minerals 1 tablet Oral Daily Monroe Euceda MD    ondansetron 4 mg Intravenous Q6H PRN Monroe Euceda MD    polyethylene glycol 17 g Oral Daily HUNTER Grider      Continuous Infusions:   PRN Meds: dextran 70-hypromellose    ondansetron    Surgical procedures (if appropriate):

## 2019-08-28 NOTE — ASSESSMENT & PLAN NOTE
· Currently the patient is on multiple anti anxiety and depressive medications      · However with the patient's tendency for polypharmacy and its presentation of increased somnolence  · Pt family reports noticing a change since addition of Paxyl to regimen, on hold at this time

## 2019-08-28 NOTE — PROGRESS NOTES
Progress Note - Neurology   Michael Quintero 80 y o  female MRN: [id number]  Unit/Bed#: [facility] 804-01 Encounter: [id number]    Assessment:  Michael Quintero is a 81 yo F with PMHx of advanced Parkinson's, dementia, and depression/anxiety who presents with altered mental status following prolonged sleep episode, missing daily medications, and UTI  Plan:  Toxic metabolic encephalopathy:  Likely due to tramadol use for R shoulder pain, UTI, and poor PO intake over 24 hour period in setting of underlying PD/dementia  - tramadol discontinued, ortho saw and recommended CS shot, likely will get today  - Return to daily Parkinson medication regimen and watch for return to baseline, caretaker believes some confusion still present, though attributes to hospital stay  - Primary team started patient on rocephin for UTI  - Avoid other CNS depressants    Subjective:   No events overnight  Patient's caretaker present in room, says patient did not get much sleep last night, though seems close to baseline with some confusion still  She attributes this to hospital stay  Caretaker reports that patient has been experiencing "deep sleep" episodes for a while, about once a week, though this one was much longer than any others in the past  Typically during these episodes she will sleep until about 3:00 PM, then be drowsy and want to go back to bed by 4:30  Caretaker noticed patient holding her stomach, questions if patient has an upset stomach possibly due to abx, though patient denies  ROS:  Unable to assess    Vitals: Blood pressure 160/58, pulse 64, temperature (!) 97 3 °F (36 3 °C), temperature source Axillary, resp  rate 18, height 5' 4" (1 626 m), weight 51 4 kg (113 lb 6 4 oz), SpO2 94 %  ,Body mass index is 19 47 kg/m²      Physical Exam: General appearance: alert and oriented to self  Head: Normocephalic, without obvious abnormality, atraumatic  Eyes: negative findings: conjunctivae and sclerae normal and pupils equal, round, reactive to light and accomodation  Throat: normal findings: lips normal without lesions, gums healthy, palate normal and tongue midline and normal  Neck: supple, symmetrical, trachea midline  Lungs: clear to auscultation bilaterally  Heart: regular rate and rhythm, S1, S2 normal, no murmur, click, rub or gallop  Extremities: no edema, redness or tenderness in the calves or thighs  Neurologic Exam     Mental Status   Disoriented to person: Oriented to self  Oriented to place  Oriented to time  Speech: (Dysarthric)  Level of consciousness: alert  Able to name object  Able to repeat  Cranial Nerves     CN III, IV, VI   Pupils are equal, round, and reactive to light  Extraocular motions are normal      CN VII   Facial expression full, symmetric  CN VIII   CN VIII normal      CN XII   CN XII normal    Visual field blink to threat intact     Motor Exam Increased tone likely paratonia due to PD/dementia  Spontaneous movement of all extremities     Sensory Exam   Unable to assess     Gait, Coordination, and Reflexes     Reflexes   Right brachioradialis: 3+  Left brachioradialis: 3+  Right biceps: 3+  Left biceps: 3+  Right patellar: 0  Left patellar: 0  Right achilles: 1+  Left achilles: 1+  Right plantar: normal  Left plantar: normal  Unable to assess gait/coordination         Lab, Imaging and other studies: I have personally reviewed pertinent reports  VTE Prophylaxis: Enoxaparin (Lovenox)    Counseling / Coordination of Care  Total time spent today 30 minutes  Greater than 50% of total time was spent with the patient and / or family counseling and / or coordination of care   A description of the counseling / coordination of care: chart review, patient evaluation, education

## 2019-08-28 NOTE — PROGRESS NOTES
Patient takes Thera tears at home every hour per family  I sent a message to Dr Cheryl Tesfaye with [employer]

## 2019-08-28 NOTE — ASSESSMENT & PLAN NOTE
· Tramadol d/c due to patient population and potential for increased confusion  · Will follow up with outpatient neurologist for titration of current med list for [facility]

## 2019-08-28 NOTE — ASSESSMENT & PLAN NOTE
· POA: UA positive for infection; IV rocephin started  · Urine cx pending  · Spoke with lab for preliminary report; slow growing will need to follow up tomorrow  · No fever or elevated WBC

## 2019-08-28 NOTE — SPEECH THERAPY NOTE
Speech Language/Pathology    Speech/Language Pathology Progress Note    Patient Name: Blossom Mejia  ZOVCO'ROSE MARIE Date: 2019     Problem List  Patient Active Problem List   Diagnosis    [facility]    Hypoglycemia    Femur fracture, right ([facility] )    DDD (degenerative disc disease), lumbar    Depression with anxiety    Anemia associated with acute blood loss    Displaced fracture of greater trochanter of left femur, initial encounter for closed fracture ([facility] )    [facility] to [facility]    Dysphagia    BPPV (benign paroxysmal positional vertigo)    Candida esophagitis (HCC)    Chronic low back pain    Chronic pain disorder    Dextroscoliosis    Helicobacter pylori (H  pylori) infection    Severe scoliosis    Spondylolisthesis at L4-L5 level    Urinary tract infection    Polypharmacy    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    Fall    Hypertension        Past Medical History  Past Medical History:   Diagnosis Date    Anxiety     Dementia     Depression     Gastric ulcer     H/O:  section     HLD (hyperlipidemia) 2016    Hypercholesteremia     [facility]     Raynaud's disease         Past Surgical History  Past Surgical History:   Procedure Laterality Date     SECTION      CHOLECYSTECTOMY      ESOPHAGOGASTRODUODENOSCOPY      KNEE ARTHROSCOPY Left     [place] COLONOSCOPY FLX DX W/COLLJ SPEC WHEN PFRMD N/A 3/18/2016    Procedure: COLONOSCOPY;  Surgeon: Louisa Hernández MD;  [facility]; Service: Gastroenterology    [place] ESOPHAGOGASTRODUODENOSCOPY TRANSORAL DIAGNOSTIC N/A 2016    Procedure: ESOPHAGOGASTRODUODENOSCOPY (EGD); Surgeon: Louisa Hernández MD;  [facility]; Service: Gastroenterology    [place] OPEN RX FEMUR FX+INTRAMED CHARLEY Right 2017    Procedure: INSERTION NAIL IM FEMUR ANTEGRADE (TROCHANTERIC);   Surgeon: Clovis Figueroa MD; [facility];  Service: Orthopedics         Subjective:  "Where are you from?"     Objective: The patient is awake and alert today  She is OOB in chair with caregiver present  Baseline diet discussed in more detail with caregiver, who reports most food is soft, and cut into small pieces  She states that the overall rigidity is similar to patient's baseline status, and does fluctuate at home  The patient dislikes puree solids  She is assessed with diced peaches and siri cracker  The patient has double swallows with peaches, and appears to swallow 1 piece whole before fully masticating the second piece  Max verbal cues and reminders are given to masticate fully  Mastication is improved and more complete/efficient with siri cracker  No oral residue is observed  The patient does have decreased ability to bite cracker and small pieces are placed in patient's oral cavity  She is observed taking small, consecutive sips of thin liquids via straw with no overt s/s aspiration  Assessment:  Patient tolerated siri cracker well, but continues with some difficulty swallowing diced peaches  Plan/Recommendations:  Recommend diet upgrade to mechanical soft with thin liquids  Patient must have strict 1:1 supervision with meals to ensure safety  Caregiver reports that someone from home is there for dinner and breakfast  ST will follow up to further assess tolerance

## 2019-08-28 NOTE — QUICK NOTE
Got called about the patient's family reconsidering the steroid injection  Discussed risks and benefits with the patient's son Jesus over the phone and he agreed to it  Will proceed to inject the right shoulder today

## 2019-08-28 NOTE — PROCEDURES
Procedure- Orthopedics   Peter Boles 80 y o  female MRN: [id number]  Unit/Bed#: [facility] 804-01    Procedure: right glenohumeral injection    After sterile preparation of the skin overlying the shoulder a 25 gauge needle was inserted via a posterior approach  A mixture of 2cc   5% Marcaine, 1cc Betamethasone was injected into the glenohumeral joint without resistance  The needle was withdrawn and a sterile dressing was placed over the injection site  Pt tolerated the procedure well and was neurovascularly intact both pre and post procedure      Lise Kapadia MD

## 2019-08-28 NOTE — PHYSICAL THERAPY NOTE
Physical Therapy Evaluation     Patient's Name: Cathleen Hernadez    Admitting Diagnosis  Parkinson disease, symptomatic ([facility] [address] ) [G20]  UTI (urinary tract infection) [N39 0]  Altered mental status [R41 82]  Closed displaced fracture of acromial end of right clavicle with nonunion, subsequent encounter [S42 [id number]]    Problem List  Patient Active Problem List   Diagnosis    Parkinson disease, symptomatic ([address] )    Hypoglycemia    Femur fracture, right ([address] )    DDD (degenerative disc disease), lumbar    Depression with anxiety    Anemia associated with acute blood loss    Displaced fracture of greater trochanter of left femur, initial encounter for closed fracture ([facility] [address] )    Dementia due to Parkinson's disease without behavioral disturbance (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 [address] )     Raynaud's disease        Past Surgical History  Past Surgical History:   Procedure Laterality Date     SECTION      CHOLECYSTECTOMY      ESOPHAGOGASTRODUODENOSCOPY      KNEE ARTHROSCOPY Left     FL COLONOSCOPY FLX DX W/COLLJ SPEC WHEN PFRMD N/A 3/18/2016    Procedure: COLONOSCOPY;  Surgeon: Robyn Hoffmann MD;  [facility]; Service: Gastroenterology    FL ESOPHAGOGASTRODUODENOSCOPY TRANSORAL DIAGNOSTIC N/A 2016    Procedure: ESOPHAGOGASTRODUODENOSCOPY (EGD);   Surgeon: Robyn Hoffmann MD;  [facility]; Service: Gastroenterology    OR OPEN RX FEMUR FX+INTRAMED CHARLEY Right 2/23/2017    Procedure: INSERTION NAIL IM FEMUR ANTEGRADE (TROCHANTERIC); Surgeon: Marisa Antonio MD;  [facility];  Service: Orthopedics        08/28/19 7930   Note Type   Note type Eval only   Pain Assessment   Pain Assessment FLACC   Pain Rating: FLACC (Rest) - Face 0   Pain Rating: FLACC (Rest) - Legs 0   Pain Rating: FLACC (Rest) - Activity 0   Pain Rating: FLACC (Rest) - Cry 0   Pain Rating: FLACC (Rest) - Consolability 0   Score: FLACC (Rest) 0   Pain Rating: FLACC (Activity) - Face 1  (W/ MOVEMENT OF R SHOULDER)   Pain Rating: FLACC (Activity) - Legs 0   Pain Rating: FLACC (Activity) - Activity 0   Pain Rating: FLACC (Activity) - Cry 0   Pain Rating: FLACC (Activity) - Consolability 0   Score: FLACC (Activity) 1   Home Living   Type of Home House   Home Layout Two level; Able to live on main level with bedroom/bathroom; Performs ADLs on one level   Bathroom Shower/Tub Tub/shower unit   Bathroom Toilet Raised   Bathroom Equipment Shower chair;Commode   [address] [facility]; Wheelchair-manual;Hospital bed   Additional Comments PER CAREGIVER PRESENT DURING EVALUATION PATIENT RESIDES IN HER OWN HOME WITH 24/7 CAREGIVER ASSISTANCE  AT TIMES THERE ARE TWO AIDS DURING WHICH THEY WILL TRY AND AMBULATE WITH PATIENT AS AN AX2 WITH A RW HOWEVER NORMALLY THERE IS JUST ONE AIDE AND SO PATIENT PARTICIPATES IN STAND PIVOT TRANSFERS (BED<-->CHAIR<-->COMMODE) WITH MAXIMAL ASSISTANCE  PATIENT RECEIVES ASSISTANCE WITH ALL ADLS  PER CAREGIVER, SHE IS CONCERNED THAT R UE PAIN MAY BE DUE TO HOWEVER PATIENT HAS BEEN TRANSFERRED AND EXPRESSES QUESTIONS ABOUT HOW TO TRANSFER PATIENT AS AN AX1 WITHOUT USING ARMS  Prior Function   Level of [place] Needs assistance with ADLs and functional mobility; Needs assistance with IADLs   Lives With Other (Comment)  (24/7 CAREGIVERS)   Receives Help From Personal care attendant   ADL Assistance Needs assistance   IADLs Needs assistance   Falls in the last 6 months 1 to 4   Vocational Retired   Restrictions/Precautions   Wells Lisandro Bearing Precautions Per Order Yes   RUE Weight Bearing Per Order WBAT   Braces or Orthoses   (NONE)   Other Precautions Hard of hearing;Pain; Fall Risk;Bed Alarm; Chair Alarm;Cognitive   General   Family/Caregiver Present No   Cognition   Overall Cognitive Status Impaired   Arousal/Participation Alert   Attention Difficulty attending to directions   Orientation Level Oriented to person;Disoriented to situation;Disoriented to time;Disoriented to place   Memory Decreased recall of precautions;Decreased recall of recent events;Decreased short term memory;Decreased long term memory   Following Commands Follows one step commands with increased time or repetition   RUE Assessment   RUE Assessment   (SEE OT EVAL )   LUE Assessment   LUE Assessment   (SEE OT EVAL )   RLE Assessment   RLE Assessment X   Strength RLE   RLE Overall Strength 3-/5   LLE Assessment   LLE Assessment X   Strength LLE   LLE Overall Strength 3-/5   Bed Mobility   Supine to Sit 2  Maximal assistance   Additional items Assist x 2; Increased time required;LE management;Verbal cues   Transfers   Sit pivot 1  Dependent   Additional items Assist x 1  (SBA 2ND PERSON FOR SAFETY )   Balance   Static Sitting Poor -   Endurance Deficit   Endurance Deficit Yes   Endurance Deficit Description GROSS DECONDITIONING   Activity Tolerance   Activity Tolerance Other (Comment)  (LIMITED BY COGNITION (REDUCED CAPACITY TO LEARN/PARTICIPATE))   Medical Staff Made Aware OT CHRISTIANA; CM SAGE AND ROBIN; PCA REGARDING HOW TO TRANSFER BACK TO BED   Nurse Made Aware JR TO SEE PER RN BRENDEN    Assessment   Prognosis Guarded   Problem List Decreased strength;Decreased endurance; Impaired balance;Decreased mobility; Decreased cognition; Impaired judgement;Decreased safety awareness; Impaired hearing   Assessment PT COMPLETED EVALUATION OF 80YEAR OLD FEMALE ADMITTED TO -B ON 8/27/17 S/P FALL WITH EXACERBATION OF PARKINSON DISEASE SYMPTOMS  CURRENT DIAGNOSES INCLUDE SYMPTOMATIC PARKINSON DISEASE, FALL (COMPLAINS OF R SHOULDER PAIN, IMAGING (-), ORTHO REPORTS "WBAT R UE" AND IS RECOMMENDING CS INJECTION), AND UTI  CURRENT STATUS INSTABILITIES INCLUDE PAIN, CONTINUOUS O2/HR MONITORING, FALLS RISK, AND BED/CHAIR ALARMS  PMH IS SIGNIFICANT FOR PD, DEMENTIA (PER FAMILY NORMALLY ORIENTED TO PEOPLE AND PLACES), RAYNAUDS DISEASE, DEPRESSION, AND FEMOR FRACTURE (02/2017)  PER CAREGIVER PRESENT DURING EVALUATION PATIENT RESIDES IN HER OWN HOME WITH 24/7 CAREGIVER ASSISTANCE  AT TIMES THERE ARE TWO AIDS DURING WHICH THEY WILL TRY AND AMBULATE WITH PATIENT AS AN AX2 WITH A RW HOWEVER NORMALLY THERE IS JUST ONE AIDE AND SO PATIENT PARTICIPATES IN STAND PIVOT TRANSFERS (BED<-->CHAIR<-->COMMODE) WITH MAXIMAL ASSISTANCE  PATIENT RECEIVES ASSISTANCE WITH ALL ADLS  PER CAREGIVER, SHE IS CONCERNED THAT R UE PAIN MAY BE DUE TO HOWEVER PATIENT HAS BEEN TRANSFERRED AND EXPRESSES QUESTIONS ABOUT HOW TO TRANSFER PATIENT AS AN AX1 WITHOUT USING ARMS  PT DISCUSSED OPTIONS INCLUDING USE OF GAIT BELT AND ALSO KEEPING HALF FOLDED SHEET OR TOWEL UNDER PATIENT'S BOTTOM AS SLING FOR PERFORMANCE OF TRANSFERS  DURING PT EVALUATION CAREGIVER PRESENT FOR OBSERVATION  PATIENT DID NOT FOLLOW COMMANDS AND REQUIRED MAX-AX2 TO PERFORM SUPINE-->SIT TRANSFER AND MOD-AX1 TO MAINTAIN STATIC SITTING  PATIENT WAS TRANSFERRED FROM BED TO DROP ARM CHAIR VIA SIT PIVOT TRANSFER WITH USE OF BED PAD UNDER PATIENT'S BOTTOM, DEPENDENT AX1 WITH THERAPIST POSITIONED IN FRONT OF PATIENT AND SBA OF SECOND PERSON   IN FUTURE SESSIONS PLAN TO PROGRESS PATIENT'S ABILITY TO PARTICIPATE IN TRANSFERS AND PROVIDE EDUCATION TO CAREGIVER FOR CARRYOVER UPON D/C  PER SON ALSO PRESENT DURING EVALUATION, HE ONLY WISHES FOR PATIENT TO RETURN HOME WITH HOME PT/OT SERVICES AND ANY INCREASED ASSISTANCE THAT MAY BE PROVIDED WITH AIDS (IE  HAVING TWO AIDS THERE MORE OFTEN)  PT SPOKE WITH CM REGARDING FAMILY'S REQUESTS  PT D/C RECOMMENDATION IS FOR HOME WITH CONTINUED 24 HOUR CARE AND HOME SERVICES  PATIENT WILL BENEFIT FROM CONTINUED SKILLED PT THIS ADMISSION TO ACHIEVE MAXIMAL FUNCTION AND SAFETY  Goals   Patient Goals UNABLE TO EXPRESS 2* COG DEFICITS; CAREGIVER GOAL IS TO LEARN HOW TO TRANSFER PATIENT AX1 W/O USE OF PATIENT'S ARMS    LTG Expiration Date 09/11/19   Long Term Goal #1 1) PERFORM BED MOBILITY MOD-AX1 TO PARTICIPATE IN FREQUENT REPOSITIONING AND IMPROVE SKIN INTEGRITY; 2) PERFORM SIT PIVOT, SIT<-->STAND, AND STAND PIVOT TRANSFER MOD-AX1 TO PROMOTE I WITH TOILETING AND OOB MOBILITY; 3) AMBULATE 20 FEET MOD-AX2 W/ RW TO PARTICIPATE IN HOUSEHOLD AND COMMUNITY LEVEL AMBULATION; 4) IMPROVE B/L LE STRENGTH BY 1/2 GRADE TO IMPROVE EFFICIENCY OF TRANSFERS; 5) IMPROVE BALANCE BY 1 GRADE TO REDUCE RISK FOR FALLS; 6) PROVIDE FAMILY/CAREGIVER TRAINING TO ADDRESS QUESTIONS ABOUT TRANSFERRING PATIENT W/ MINIMAL USE OF PATIENT'S B/L UE TO ASSIST IN PERFORMANCE OF SAFE TRANFERS UPON D/C   Treatment Day 0   Plan   Treatment/Interventions Functional transfer training;LE strengthening/ROM; Therapeutic exercise; Endurance training;Patient/family training;Equipment eval/education; Bed mobility;Gait training;OT;Spoke to case management;Spoke to nursing   PT Frequency Other (Comment)  (3-5X/WK)   Recommendation   Recommendation Home with family support;24 hour supervision/assist;Home PT   Equipment Recommended   (HAS ALL NECESSARY DME )   PT - OK to Discharge Yes  (HOME W/ PRIOR LEVEL OF CARE + HHPT WHEN MED JR )   Barthel Index   Feeding 5   Bathing 0   Grooming Score 0   Dressing Score 0   Bladder Score 0   Bowels Score 5   Toilet Use Score 0   Transfers (Bed/Chair) Score 5   Mobility (Level Surface) Score 0   Stairs Score 0   Barthel Index Score 15           Madina Rios, PT

## 2019-08-28 NOTE — OCCUPATIONAL THERAPY NOTE
[address] Evaluation     Patient Name: Leanan Hutchinson  ZYFTF'[id number] Date: 2019  Problem List  Patient Active Problem List   Diagnosis    Parkinson disease, symptomatic ([facility] [address] )    Hypoglycemia    Femur fracture, right ([facility] [address] )    DDD (degenerative disc disease), lumbar    Depression with anxiety    Anemia associated with acute blood loss    Displaced fracture of greater trochanter of left femur, initial encounter for closed fracture ([facility] [address] )    Dementia due to Parkinson's disease without behavioral disturbance (HCC)    Dysphagia    BPPV (benign paroxysmal positional vertigo)    Candida esophagitis (HCC)    Chronic low back pain    Chronic pain disorder    Dextroscoliosis    Helicobacter pylori (H  pylori) infection    Severe scoliosis    Spondylolisthesis at L4-L5 level    Urinary tract infection    Polypharmacy    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    Fall    Hypertension     Past Medical History  Past Medical History:   Diagnosis Date    Anxiety     Dementia     Depression     Gastric ulcer     H/O:  section     HLD (hyperlipidemia) 2016    Hypercholesteremia     Parkinson's disease ([facility] [address] )     Raynaud's disease      Past Surgical History  Past Surgical History:   Procedure Laterality Date     SECTION      CHOLECYSTECTOMY      ESOPHAGOGASTRODUODENOSCOPY      KNEE ARTHROSCOPY Left     NH COLONOSCOPY FLX DX W/COLLJ SPEC WHEN PFRMD N/A 3/18/2016    Procedure: COLONOSCOPY;  Surgeon: Toya Santoro MD;  [facility]; Service: Gastroenterology    NH ESOPHAGOGASTRODUODENOSCOPY TRANSORAL DIAGNOSTIC N/A 2016    Procedure: ESOPHAGOGASTRODUODENOSCOPY (EGD); Surgeon: Toya Santoro MD;  [facility]; Service: Gastroenterology    NH OPEN RX FEMUR FX+INTRAMED CHARLEY Right 2017    Procedure: INSERTION NAIL IM FEMUR ANTEGRADE (TROCHANTERIC);   Surgeon: Aguilar Burnham MD;  [facility];  Service: Orthopedics 08/28/19 1120   Note Type   Note type Eval only   Restrictions/Precautions   Weight Bearing Precautions Per Order Yes   RUE Weight Bearing Per Order WBAT   Other Precautions Cognitive; Chair Alarm; Bed Alarm;Multiple lines;Telemetry; Fall Risk;Pain;WBS   Pain Assessment   Pain Assessment No/denies pain   Pain Score No Pain   Home Living   Type of Home House   Home Layout Two level; Able to live on main level with bedroom/bathroom  (2STE)   Bathroom Shower/Tub Tub/shower unit   Bathroom Toilet Raised   Bathroom Equipment Commode; [facility] [address]; Wheelchair-manual;Hospital bed   Prior Function   Level of [place] Needs assistance with IADLs; Needs assistance with ADLs and functional mobility   Lives With   (Caregivers 24/7)   Receives Help From Personal care attendant; Family   ADL Assistance Needs assistance   IADLs Needs assistance   Falls in the last 6 months 1 to 4   Vocational Retired   [address] with ADL's/IADL's-pt able to self feed post set-up, ambulates with RW and assist x 2, transfers assist of one to w/c, commode, bed  (caregiver reports pt is set-up for self feeding)   Reciprocal Relationships Caregivers, family -son   Service to Others retired   Intrinsic Gratification cooking/baking   Psychosocial   Psychosocial (WDL) WDL   Subjective   Subjective pt received with caregiver and son present during evaluation, pt agreeble for therapy   ADL   Where Assessed Chair   Eating Assistance 2  Maximal Assistance   Eating Deficit   (Per caregiver pt able to feed self, now unable due to right UE pain )   Grooming Assistance 1  Total Assistance   UB Bathing Assistance 1  Total Assistance   LB Bathing Assistance 1  Total Assistance   [address] 1  Total Assistance   LB Dressing Assistance 1  Total Assistance   Toileting Assistance  1  Total Assistance   Transfers   Sit pivot 1  Dependent   Additional items Assist x 2; Increased time required;Verbal cues  (bed>drop arm chair)   Balance   Static Sitting Poor   Activity Tolerance   Activity Tolerance Patient limited by fatigue   Medical Staff Made Aware CM   Nurse Made Aware RN cleared pt for therapy   RUE Assessment   RUE Assessment X  (pain with shoulder flexion, elbow flex/ext-did not formally assess due to pain, pt's cognitive status )   LUE Assessment   LUE Assessment WFL   Hand Function   Gross Motor Coordination Impaired   Fine Motor Coordination Impaired   Cognition   Overall Cognitive Status Impaired   Arousal/Participation Alert   Attention Difficulty attending to directions   Orientation Level Oriented to person;Disoriented to time;Disoriented to situation;Disoriented to place   Memory Decreased recall of recent events;Decreased recall of precautions;Decreased recall of biographical information;Decreased short term memory;Decreased long term memory   Following Commands Follows one step commands inconsistently   Assessment   Limitation Decreased ADL status; Decreased cognition;Decreased endurance;Decreased self-care trans;Decreased high-level ADLs   Prognosis Guarded   Assessment Pt is a 80 y o  female who was admitted to [facility] on 8/27/2019 with Parkinson disease, symptomatic ([address] ), fall xray shoulder no acute fracture/dislocatin,depression aniexty, UTI, polypharmacy, fall, HTN per orthopedics pt is WBAT to [facility]   Pt's problem list also includes PMH of chronic low back pain, dysphagia, BPPV, anemia, DDD, parkinson's disease   At baseline pt was completing set-up with self feeding, assist with all other ADL's, assist of 1 sit pivot transfers, mobility-W/C dependent  Pt lives in her own home with 24/7 caregivers [facility]  Currently pt requires D for overall ADLS and D without AD for sit pivot transfers   Pt currently presents with impairments in the following categories -difficulty performing ADLS, limited insight into deficits and decreased initiation and engagement  activity tolerance and endurance  These impairments, as well as pt's fatigue, pain and risk for falls  limit pt's ability to safely engage in all baseline areas of occupation, includingeating, grooming, bathing, dressing, toileting and functional mobility/transfers From OT standpoint, recommend -per son goal for family is for pt to return home with continued 24/7 assistance/S and home OT upon D/C  No further acute OT needs indicated at this time - Recommend continued oob for meals, ambulation to/from BR, setup for self care tasks and mobility in hallway with nursing/restorative - d/c from caseload with above recommendations     Goals   Patient Goals per caregiver/son -decrease pain to RUE and I with transfers   Recommendation   OT Discharge Recommendation Home with family support   OT - OK to Discharge Yes   Barthel Index   Feeding 5   Bathing 0   Grooming Score 0   Dressing Score 0   Bladder Score 0   Bowels Score 5   Toilet Use Score 0   Transfers (Bed/Chair) Score 5   Mobility (Level Surface) Score 0   Stairs Score 0   Barthel Index Score 15   Modified [place] Scale   Modified [place] Scale 4     Carrie Fernández MOT, OTR/L

## 2019-08-29 PROCEDURE — 99222 1ST HOSP IP/OBS MODERATE 55: CPT | Performed by: INTERNAL MEDICINE

## 2019-08-29 PROCEDURE — 97112 NEUROMUSCULAR REEDUCATION: CPT | Performed by: PHYSICAL THERAPIST

## 2019-08-29 PROCEDURE — G8998 SWALLOW D/C STATUS: HCPCS

## 2019-08-29 PROCEDURE — 97116 GAIT TRAINING THERAPY: CPT | Performed by: PHYSICAL THERAPIST

## 2019-08-29 PROCEDURE — 99232 SBSQ HOSP IP/OBS MODERATE 35: CPT | Performed by: INTERNAL MEDICINE

## 2019-08-29 PROCEDURE — 92526 ORAL FUNCTION THERAPY: CPT

## 2019-08-29 RX ADMIN — CARBIDOPA AND LEVODOPA 1 TABLET: 50; 200 TABLET, EXTENDED RELEASE ORAL at 14:48

## 2019-08-29 RX ADMIN — ENOXAPARIN SODIUM 40 MG: 40 INJECTION SUBCUTANEOUS at 10:48

## 2019-08-29 RX ADMIN — ACETAMINOPHEN 975 MG: 325 TABLET ORAL at 06:51

## 2019-08-29 RX ADMIN — POLYETHYLENE GLYCOL 3350 17 G: 17 POWDER, FOR SOLUTION ORAL at 21:40

## 2019-08-29 RX ADMIN — ACETAMINOPHEN 975 MG: 325 TABLET ORAL at 21:40

## 2019-08-29 RX ADMIN — ENTACAPONE 200 MG: 200 TABLET ORAL at 17:23

## 2019-08-29 RX ADMIN — Medication 400 UNITS: at 10:48

## 2019-08-29 RX ADMIN — CARBIDOPA AND LEVODOPA 1 TABLET: 50; 200 TABLET, EXTENDED RELEASE ORAL at 21:40

## 2019-08-29 RX ADMIN — ENTACAPONE 200 MG: 200 TABLET ORAL at 06:51

## 2019-08-29 RX ADMIN — LIDOCAINE 1 PATCH: 50 PATCH CUTANEOUS at 10:48

## 2019-08-29 RX ADMIN — DEXTRAN 70, AND HYPROMELLOSE 2910 1 DROP: 1; 3 SOLUTION/ DROPS OPHTHALMIC at 02:08

## 2019-08-29 RX ADMIN — ENTACAPONE 200 MG: 200 TABLET ORAL at 10:48

## 2019-08-29 RX ADMIN — ENTACAPONE 200 MG: 200 TABLET ORAL at 14:47

## 2019-08-29 RX ADMIN — CARBIDOPA AND LEVODOPA 1 TABLET: 50; 200 TABLET, EXTENDED RELEASE ORAL at 17:23

## 2019-08-29 RX ADMIN — CARBIDOPA AND LEVODOPA 1 TABLET: 50; 200 TABLET, EXTENDED RELEASE ORAL at 10:48

## 2019-08-29 RX ADMIN — ENTACAPONE 200 MG: 200 TABLET ORAL at 21:40

## 2019-08-29 RX ADMIN — Medication 1 TABLET: at 10:48

## 2019-08-29 RX ADMIN — ACETAMINOPHEN 975 MG: 325 TABLET ORAL at 14:47

## 2019-08-29 RX ADMIN — CEFTRIAXONE 1000 MG: 1 INJECTION, POWDER, FOR SOLUTION INTRAMUSCULAR; INTRAVENOUS at 04:00

## 2019-08-29 RX ADMIN — CARBIDOPA AND LEVODOPA 1 TABLET: 50; 200 TABLET, EXTENDED RELEASE ORAL at 06:51

## 2019-08-29 NOTE — PLAN OF CARE
Problem: Potential for Falls  Goal: Patient will remain free of falls  Description  INTERVENTIONS:  - Assess patient frequently for physical needs  -  Identify cognitive and physical deficits and behaviors that affect risk of falls    -  [place] fall precautions as indicated by assessment   - Educate patient/family on patient safety including physical limitations  - Instruct patient to call for assistance with activity based on assessment  - Modify environment to reduce risk of injury  - Consider OT/PT consult to assist with strengthening/mobility  Outcome: Progressing     Problem: Prexisting or High Potential for Compromised Skin Integrity  Goal: Skin integrity is maintained or improved  Description  INTERVENTIONS:  - Identify patients at risk for skin breakdown  - Assess and monitor skin integrity  - Assess and monitor nutrition and hydration status  - Monitor labs   - Assess for incontinence   - Turn and reposition patient  - Assist with mobility/ambulation  - Relieve pressure over bony prominences  - Avoid friction and shearing  - Provide appropriate hygiene as needed including keeping skin clean and dry  - Evaluate need for skin moisturizer/barrier cream  - Collaborate with interdisciplinary team   - Patient/family teaching  - Consider wound care consult   Outcome: Progressing     Problem: NEUROSENSORY - ADULT  Goal: Achieves stable or improved neurological status  Description  INTERVENTIONS  - Monitor and report changes in neurological status  - Monitor vital signs such as temperature, blood pressure, glucose, and any other labs ordered   - Initiate measures to prevent increased intracranial pressure  - Monitor for seizure activity and implement precautions if appropriate      Outcome: Progressing     Problem: MUSCULOSKELETAL - ADULT  Goal: Maintain or return mobility to safest level of function  Description  INTERVENTIONS:  - Assess patient's ability to carry out ADLs; assess patient's baseline for ADL function and identify physical deficits which impact ability to perform ADLs (bathing, care of mouth/teeth, toileting, grooming, dressing, etc )  - Assess/evaluate cause of self-care deficits   - Assess range of motion  - Assess patient's mobility  - Assess patient's need for assistive devices and provide as appropriate  - Encourage maximum independence but intervene and supervise when necessary  - Involve family in performance of ADLs  - Assess for home care needs following discharge   - Consider OT consult to assist with ADL evaluation and planning for discharge  - Provide patient education as appropriate  Outcome: Progressing     Problem: GENITOURINARY - ADULT  Goal: Absence of urinary retention  Description  INTERVENTIONS:  - Assess patients ability to void and empty bladder  - Monitor I/O  - Bladder scan as needed  - Discuss with physician/AP medications to alleviate retention as needed  - Discuss catheterization for long term situations as appropriate  Outcome: Progressing     Problem: METABOLIC, FLUID AND ELECTROLYTES - ADULT  Goal: Fluid balance maintained  Description  INTERVENTIONS:  - Monitor labs   - Monitor I/O and WT  - Instruct patient on fluid and nutrition as appropriate  - Assess for signs & symptoms of volume excess or deficit  Outcome: Progressing     Problem: SKIN/TISSUE INTEGRITY - ADULT  Goal: Skin integrity remains intact  Description  INTERVENTIONS  - Identify patients at risk for skin breakdown  - Assess and monitor skin integrity  - Assess and monitor nutrition and hydration status  - Monitor labs (i e  albumin)  - Assess for incontinence   - Turn and reposition patient  - Assist with mobility/ambulation  - Relieve pressure over bony prominences  - Avoid friction and shearing  - Provide appropriate hygiene as needed including keeping skin clean and dry  - Evaluate need for skin moisturizer/barrier cream  - Collaborate with interdisciplinary team (i e  Nutrition, Rehabilitation, etc )   - Patient/family teaching  Outcome: Progressing

## 2019-08-29 NOTE — PLAN OF CARE
Problem: PHYSICAL THERAPY ADULT  Goal: Performs mobility at highest level of function for planned discharge setting  See evaluation for individualized goals  Description  Treatment/Interventions: Functional transfer training, LE strengthening/ROM, Therapeutic exercise, Endurance training, Patient/family training, Equipment eval/education, Bed mobility, Gait training, OT, Spoke to case management, Spoke to nursing  Equipment Recommended: (HAS ALL NECESSARY DME )       See flowsheet documentation for full assessment, interventions and recommendations  Outcome: Progressing  Note:   Prognosis: Guarded  Problem List: Decreased strength, Decreased endurance, Decreased mobility, Impaired balance, Decreased coordination, Decreased cognition, Impaired judgement, Decreased safety awareness, Impaired hearing  Assessment: pt is alert, oriented only to self  follow commands but needs frequent re-instructoin  pt performed bed mobility, transfer and gait training  only able to amb a few steps using rw and max assist of 2  worked on sitting balance prior to standing due to lean to left while sitting  pt tolerated well, caregiver presnt for session and pleased  recommend return home, home PT  pt is close to baseline  Barriers to Discharge: None     Recommendation: Home with family support, 24 hour supervision/assist, Home PT     PT - OK to Discharge: Yes    See flowsheet documentation for full assessment

## 2019-08-29 NOTE — ASSESSMENT & PLAN NOTE
· Pt complains of shoulder pain in ED  · Shoulder XR: no acute fracture or dislocation  Mild degenerative arthritis noted at the acromioclavicular joint  No lytic or blastic lesions are seen  Soft tissues are unremarkable  · Ortho gave steroid intraarticular joint injection yesterday, she is still complaining of pain  · Discussed with family regarding completion during inpatient stay- given yesterday  · Continue lidoderm patch and tylenol standing

## 2019-08-29 NOTE — NURSING NOTE
Pt repositioned and now resting in bed  Home caregiver present with pt  VSS  Will continue to monitor pt closely

## 2019-08-29 NOTE — CONSULTS
Consultation - Geriatric Medicine   Deidra Santana 80 y o  female MRN: [id number]  Unit/Bed#: [facility] 804-01 Encounter: [id number]      Assessment/Plan   Parkinsonism:  · 79 y/o F with Parkinsonism  · Follows neurologist Dr Jill Thurman outpatient  · Presented with worsening symptoms of Parkinson's on admission due to not taking medications on previous day from sleeping all day long, suspected to be from polypharmacy, recent medication changes include adding tramadol due to right shoulder pain  · Mental status seems to have improved per caretaker, however presents with confusion on occasion, likely due to acute cystitis vs polypharmacy vs current hospital stay   · Neurology service was consulted: continue home medication sinemet  mg and entacapone 200 mg 5 times a day, avoid CNS depressants, hold sedating medications/narcotics    Polypharmacy  · Patient with home medications which can cause drowsiness such as mirtazapine, paroxetine, melatonin, doxylamine succinate  · Consider discontinuation due to high risk of drowsiness, confusion and falls     Frailty  · Frail score of 3: fatigued most of the time, requires assistance to ambulate and perform most ADLs  · Evaluated by PT /OT, who recommend to continue PT/OT at home  · Patient will benefit from PT/OT intervention both at hospital and to continue at home, in addition to supplementation with meals    Acute cystitis  · UA on admission with trace blood, positive nitrites, small leukocytes, occasional bacterial, yeast cell present  · Pending urine culture: Culture too young- will reincubate  · Patient has been afebrile during admission, WBC normal    · Currently on Rocephin 1G IV qd per primary team    Depression with anxiety:  · Patient at home also on mirtazapine and paroxetine     · Medications held during admission  · Consider weaning off paroxetine with plan to explore other options    Right shoulder pain  · 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  · Orthopedic team consulted:   · X-ray right shoulder: Mild degenerative arthritis noted at the acromioclavicular joint  No acute osseous abnormality  · Discussed with family members using steroid injection, family agreed, done on 8/28  · Pain management with lidoderm patch 5%    Malnutrition  · BMI of 19 48, however on physical examination patient cachectic  · Nutritional service consulted  · Recommend supplementation TID      History of Present Illness    Physician Requesting Consult: Natanael Lowery MD  Reason for Consult / Principal Problem: Parkinson's, AMS, UTI, polypharmacy   Hx and PE limited by: dementia      HPI: Chelsey Guallpa is a 80y o  year old female who presents with worsening parkinson symptoms, mid dehydration and foul smelling urine  Patient had spent 24 hrs sleeping, did not take medications, no intake of foods or fluids  Caretaker Janay Midget) reports patient usually has episodes of deep sleep, she would fall asleep at usual hour but wakes up midday next day  When she has these episodes, Isai Daniel reports she wakes up "zombie like" and falls asleep again a few hours later  However, normally patient has good memory, is alert and awake, is able to follow conversations  She needs assistance ambulating, and performing ADLs  Is able to keep track of her family and follow up on things with them  Reports with worsening dementia and parkinson's patient will have occasions of of randomly not following verbal commands, unable to finish sentences  Follows outpatient neurology with Dr Micah Martel, PCP does home visits             Inpatient consult to Gerontology     Performed by  Gayle Valdez MD     Authorized by Natanael Lowery MD              Review of Systems   Unable to perform ROS: Dementia         Geriatric Conditions:   Mobility:  Requires assistance  Assistive Devices: walker  Fraility: score of 3  Nutrition/weight loss/grocery shopping/meal preparation: caretaker prepares meals, states patient has good appetite  Vision impairment: wears glasses  Hearing impairment: none  Polypharmacy: yes  Patients primary residence:her home Lives with: caretakers , sons visit often  ADL's:  Hygiene: needs assistance, can brush her teeth, Dressing: needs assistance, can undress on occasion, Feeding: able to feed herself, Transferring: , Toileting: unknown, Ambulation: needs 2 people for assistance and uses walker    Historical Information   Past Medical History:   Diagnosis Date    Anxiety     Dementia     Depression     Gastric ulcer     H/O:  section     HLD (hyperlipidemia) 2016    Hypercholesteremia     [facility]'s disease ([facility] [address] [facility]     Raynaud's disease      Past Surgical History:   Procedure Laterality Date     SECTION      CHOLECYSTECTOMY      ESOPHAGOGASTRODUODENOSCOPY      KNEE ARTHROSCOPY Left     TN COLONOSCOPY FLX DX W/COLLJ SPEC WHEN PFRMD N/A 3/18/2016    Procedure: COLONOSCOPY;  Surgeon: Magdaleno Barbosa MD;  [facility]; Service: Gastroenterology    TN ESOPHAGOGASTRODUODENOSCOPY TRANSORAL DIAGNOSTIC N/A 2016    Procedure: ESOPHAGOGASTRODUODENOSCOPY (EGD); Surgeon: Magdaleno Barbosa MD;  [facility]; Service: Gastroenterology    TN OPEN RX FEMUR FX+INTRAMED CHARLEY Right 2017    Procedure: INSERTION NAIL IM FEMUR ANTEGRADE (TROCHANTERIC); Surgeon: Joseph Ayala MD;  [facility];  Service: Orthopedics     Social History   Social History     Substance and Sexual Activity   Alcohol Use Never    Alcohol/week: 1 0 standard drinks    Types: 1 Glasses of wine per week    Frequency: Never    Comment: social     Social History     Substance and Sexual Activity   Drug Use No     Social History     Tobacco Use   Smoking Status Never Smoker   Smokeless Tobacco Never Used     Family History: History reviewed  No pertinent family history      Meds/Allergies   all current active meds have been reviewed and current meds: Current Facility-Administered Medications   Medication Dose Route Frequency    acetaminophen (TYLENOL) tablet 975 mg  975 mg Oral Q8H [facility]    carbidopa-levodopa (SINEMET CR)  mg per ER tablet 1 tablet  1 tablet Oral 5x Daily    cefTRIAXone (ROCEPHIN) 1,000 mg in dextrose 5 % 50 mL IVPB  1,000 mg Intravenous Q24H    cholecalciferol (VITAMIN D) oral liquid 400 Units  400 Units Oral Daily    dextran 70-hypromellose (GENTEAL TEARS) 0 1-0 3 % ophthalmic solution 1 drop  1 drop Both Eyes PRN    enoxaparin (LOVENOX) subcutaneous injection 40 mg  40 mg Subcutaneous Daily    entacapone (COMTAN) tablet 200 mg  200 mg Oral 5x Daily    lidocaine (LIDODERM) 5 % patch 1 patch  1 patch Topical Daily    multivitamin-minerals (CENTRUM) tablet 1 tablet  1 tablet Oral Daily    ondansetron (ZOFRAN) injection 4 mg  4 mg Intravenous Q6H PRN    polyethylene glycol (MIRALAX) packet 17 g  17 g Oral Daily       Allergies   Allergen Reactions    Penicillins      Unknown reaction  Does not remember when last had it       Objective     Intake/Output Summary (Last 24 hours) at 8/29/2019 1046  Last data filed at 8/29/2019 0600  Gross per 24 hour   Intake 826 67 ml   Output 356 ml   Net 470 67 ml     Invasive Devices     Peripheral Intravenous Line            Peripheral IV 08/28/19 Left Forearm 1 day                Physical Exam   Constitutional: She appears cachectic  She does not appear ill  No distress  HENT:   Head: Normocephalic and atraumatic  Eyes: EOM are normal    Cardiovascular: Normal rate and regular rhythm  Exam reveals gallop  Pulmonary/Chest: Effort normal and breath sounds normal  She has no wheezes  She has no rales  Abdominal: Bowel sounds are normal  There is no tenderness  Neurological: She is alert  She is disoriented  Skin: Skin is warm  Psychiatric: She has a normal mood and affect  Vitals reviewed        Lab Results:   I have personally reviewed pertinent lab results including the following:  Results for orders placed or performed during the hospital encounter of 08/27/19   Urine culture   Result Value Ref Range    Urine Culture Culture too young- will reincubate    CBC and differential   Result Value Ref Range    WBC 6 10 4 31 - 10 16 Thousand/uL    RBC 4 64 3 81 - 5 12 Million/uL    Hemoglobin 14 6 11 5 - 15 4 g/dL    Hematocrit 44 7 34 8 - 46 1 %    MCV 96 82 - 98 fL    MCH 31 5 26 8 - 34 3 pg    MCHC 32 7 31 4 - 37 4 g/dL    RDW 14 1 11 6 - 15 1 %    MPV 11 2 8 9 - 12 7 fL    Platelets 533 752 - 179 Thousands/uL    nRBC 0 /100 WBCs    Neutrophils Relative 65 43 - 75 %    Immat GRANS % 0 0 - 2 %    Lymphocytes Relative 26 14 - 44 %    Monocytes Relative 6 4 - 12 %    Eosinophils Relative 2 0 - 6 %    Basophils Relative 1 0 - 1 %    Neutrophils Absolute 3 96 1 85 - 7 62 Thousands/µL    Immature Grans Absolute 0 01 0 00 - 0 20 Thousand/uL    Lymphocytes Absolute 1 61 0 60 - 4 47 Thousands/µL    Monocytes Absolute 0 39 0 17 - 1 22 Thousand/µL    Eosinophils Absolute 0 09 0 00 - 0 61 Thousand/µL    Basophils Absolute 0 04 0 00 - 0 10 Thousands/µL   Comprehensive metabolic panel   Result Value Ref Range    Sodium 139 136 - 145 mmol/L    Potassium 4 9 3 5 - 5 3 mmol/L    Chloride 102 100 - 108 mmol/L    CO2 30 21 - 32 mmol/L    ANION GAP 7 4 - 13 mmol/L    BUN 24 5 - 25 mg/dL    Creatinine 0 82 0 60 - 1 30 mg/dL    Glucose 127 65 - 140 mg/dL    Calcium 9 4 8 3 - 10 1 mg/dL    AST 39 5 - 45 U/L    ALT 34 12 - 78 U/L    Alkaline Phosphatase 88 46 - 116 U/L    Total Protein 7 4 6 4 - 8 2 g/dL    Albumin 3 6 3 5 - 5 0 g/dL    Total Bilirubin 0 65 0 20 - 1 00 mg/dL    eGFR 64 ml/min/1 73sq m   TSH, 3rd generation with Free T4 reflex   Result Value Ref Range    TSH 3RD GENERATON 2 500 0 358 - 3 740 uIU/mL   Troponin I   Result Value Ref Range    Troponin I <0 02 <=0 04 ng/mL   Protime-INR   Result Value Ref Range    Protime 13 1 11 6 - 14 5 seconds    INR 1 03 0 84 - 1 19   APTT   Result Value Ref Range    PTT 27 23 - 37 seconds   Lipase   Result Value Ref Range    Lipase 53 (L) 73 - 393 u/L   Urine Microscopic   Result Value Ref Range    RBC, UA None Seen None Seen, 0-5 /hpf    WBC, UA 4-10 (A) None Seen, 0-5, 5-55, 5-65 /hpf    Epithelial Cells Occasional None Seen, Occasional /hpf    Bacteria, UA Occasional None Seen, Occasional /hpf    URINE COMMENT Yeast cells present      CK (with reflex to MB)   Result Value Ref Range    Total CK 98 26 - 192 U/L   TSH, 3rd generation   Result Value Ref Range    TSH 3RD GENERATON 0 867 0 358 - 3 740 uIU/mL   Comprehensive metabolic panel   Result Value Ref Range    Sodium 142 136 - 145 mmol/L    Potassium 4 2 3 5 - 5 3 mmol/L    Chloride 108 100 - 108 mmol/L    CO2 29 21 - 32 mmol/L    ANION GAP 5 4 - 13 mmol/L    BUN 15 5 - 25 mg/dL    Creatinine 0 72 0 60 - 1 30 mg/dL    Glucose 90 65 - 140 mg/dL    Calcium 9 0 8 3 - 10 1 mg/dL    AST 35 5 - 45 U/L    ALT 8 (L) 12 - 78 U/L    Alkaline Phosphatase 94 46 - 116 U/L    Total Protein 7 1 6 4 - 8 2 g/dL    Albumin 3 5 3 5 - 5 0 g/dL    Total Bilirubin 0 63 0 20 - 1 00 mg/dL    eGFR 74 ml/min/1 73sq m   Magnesium   Result Value Ref Range    Magnesium 2 1 1 6 - 2 6 mg/dL   CBC and differential   Result Value Ref Range    WBC 7 62 4 31 - 10 16 Thousand/uL    RBC 4 39 3 81 - 5 12 Million/uL    Hemoglobin 13 7 11 5 - 15 4 g/dL    Hematocrit 41 7 34 8 - 46 1 %    MCV 95 82 - 98 fL    MCH 31 2 26 8 - 34 3 pg    MCHC 32 9 31 4 - 37 4 g/dL    RDW 13 5 11 6 - 15 1 %    MPV 11 0 8 9 - 12 7 fL    Platelets 899 109 - 164 Thousands/uL    nRBC 0 /100 WBCs    Neutrophils Relative 68 43 - 75 %    Immat GRANS % 0 0 - 2 %    Lymphocytes Relative 21 14 - 44 %    Monocytes Relative 9 4 - 12 %    Eosinophils Relative 1 0 - 6 %    Basophils Relative 1 0 - 1 %    Neutrophils Absolute 5 17 1 85 - 7 62 Thousands/µL    Immature Grans Absolute 0 02 0 00 - 0 20 Thousand/uL    Lymphocytes Absolute 1 58 0 60 - 4 47 Thousands/µL    Monocytes Absolute 0 71 0 17 - 1 22 Thousand/µL    Eosinophils Absolute 0 10 0 00 - 0 61 Thousand/µL    Basophils Absolute 0 04 0 00 - 0 10 Thousands/µL   Procalcitonin   Result Value Ref Range    Procalcitonin <0 05 <=0 25 ng/ml   POCT urinalysis dipstick   Result Value Ref Range    Color, UA tito    ECG 12 lead   Result Value Ref Range    Ventricular Rate 61 BPM    Atrial Rate 61 BPM    CT Interval 152 ms    QRSD Interval 134 ms    QT Interval 446 ms    QTC Interval 448 ms    P Axis 70 degrees    QRS Axis 61 degrees    T Wave [place] 42 degrees   ED Urine Macroscopic   Result Value Ref Range    Color, UA Tito     Clarity, UA Clear     pH, UA 7 0 4 5 - 8 0    Leukocytes, UA Small (A) Negative    Nitrite, UA Positive (A) Negative    Protein, UA Negative Negative mg/dl    Glucose, UA Negative Negative mg/dl    Ketones, UA 15 (1+) (A) Negative mg/dl    Urobilinogen, UA 0 2 0 2, 1 0 E U /dl E U /dl    Bilirubin, UA Negative Negative    Blood, UA Trace (A) Negative    Specific Gravity, UA 1 015 1 003 - 1 030       I have personally reviewed the following imaging study reports:  XR shoulder 2+ views RIGHT   Final Result      No acute osseous abnormality  Workstation performed: [id number]         CT head without contrast   Final Result      1  No acute intracranial hemorrhage, midline shift, or mass effect  2   Chronic small vessel stomach changes  Workstation performed: [id number]         XR humerus RIGHT   Final Result      No acute osseous abnormality  Workstation performed: [id number]         XR chest 1 view   Final Result      No acute cardiopulmonary disease              Workstation performed: [id number]           VTE Prophylaxis: Sequential compression device (Venodyne)  and Enoxaparin (Lovenox)    Code Status: Level 3 - DNAR and DNI  Advance Directive and Living Will:      Power of :    POLST:      Family and Social Support: 2 sons live near the area and visit often, 1 daughter lives in [place]   Per caretaker patient keeps in close contact by phone with family  Freedom of Choice: Yes  CM Handoff Comments: Omar Jin

## 2019-08-29 NOTE — SOCIAL WORK
Cm reviewed patient during care coordination rounds with Dr Gavi Sheffield  Patient anticipated for possible discharge tomorrow pending cultures  Awaiting culture results  Patient's son to transport if discharged tomorrow but patient may need transport arranged if not stable for discharge tomorrow as patient's son will be out of town  Patient to resume 24/7 care with aides at home  SHANDA following and able to accept for [place] / [place] / [place] / Juan Reinoso / Talya Mata / Speech services  Abdelrahman following

## 2019-08-29 NOTE — ASSESSMENT & PLAN NOTE
· Patient has a recent closed fracture of the right clavicle      · ortho followed   · Received injection yesterday

## 2019-08-29 NOTE — SPEECH THERAPY NOTE
Speech Language/Pathology    Speech/Language Pathology Progress Note    Patient Name: Brianna Ramirez  PQPNR'F Date: 2019     Problem List  Patient Active Problem List   Diagnosis    Parkinson disease, symptomatic ([facility] [address] )    Toxic metabolic encephalopathy    Hypoglycemia    Femur fracture, right ([facility] [address]    DDD (degenerative disc disease), lumbar    Depression with anxiety    Anemia associated with acute blood loss    Displaced fracture of greater trochanter of left femur, initial encounter for closed fracture ([facility] [address]    Dementia due to Parkinson's disease without behavioral disturbance (HCC)    Dysphagia    BPPV (benign paroxysmal positional vertigo)    Candida esophagitis (HCC)    Chronic low back pain    Chronic pain disorder    Dextroscoliosis    Helicobacter pylori (H  pylori) infection    Severe scoliosis    Spondylolisthesis at L4-L5 level    Urinary tract infection    Polypharmacy    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    Fall    Hypertension        Past Medical History  Past Medical History:   Diagnosis Date    Anxiety     Dementia     Depression     Gastric ulcer     H/O:  section     HLD (hyperlipidemia) 2016    Hypercholesteremia     Parkinson's disease ([facility] [address] [facility]     Raynaud's disease         Past Surgical History  Past Surgical History:   Procedure Laterality Date     SECTION      CHOLECYSTECTOMY      ESOPHAGOGASTRODUODENOSCOPY      KNEE ARTHROSCOPY Left     GA COLONOSCOPY FLX DX W/COLLJ SPEC WHEN PFRMD N/A 3/18/2016    Procedure: COLONOSCOPY;  Surgeon: Emmanuel Heaton MD;  [facility]; Service: Gastroenterology    GA ESOPHAGOGASTRODUODENOSCOPY TRANSORAL DIAGNOSTIC N/A 2016    Procedure: ESOPHAGOGASTRODUODENOSCOPY (EGD); Surgeon: Emmanuel Heaton MD;  [facility];   Service: Gastroenterology    GA OPEN RX FEMUR FX+INTRAMED CHARLEY Right 2017    Procedure: INSERTION NAIL IM FEMUR ANTEGRADE (TROCHANTERIC); Surgeon: Brianne Elmore MD;  [facility];  Service: Orthopedics         Subjective:  "I like it, but I don't want anymore"     Objective: The patient is awake and alert this afternoon  She is OOB in chair and seen at lunch meal  Caregiver is present  The patient is assessed with mechanical soft solids, including mac and cheese, ground fish and ground green beans  SLP feeds patient  Retrieval via tsp is adequate  Mastication is reduced, with patient immediately transferring some and swallowing, then fully chewing rest of bolus  Despite this, no signs of aspiration observed  Minimal lingual residue is seen, but cleared with liquid wash  She takes small, single sips of thin liquids via straw  No overt s/s aspiration observed  Caregiver reports current diet is most like how food is prepped at home  Assessment:  Continues with decreased mastication with mechanical soft solids  However, caregiver reports at baseline  Plan/Recommendations:  Continue mechanical soft diet with thin liquids   D/C ST

## 2019-08-29 NOTE — ASSESSMENT & PLAN NOTE
· Increased confusion and worsening parkinson symtoms s/p fall  · Pt family (son and daughter in law) endorse pt baseline awake, alert, and has much clearer speech than present  · Pt A+OX3 on exam  · Pt drowsy and pt family states that this is not baseline mental status at the time of admission  · Today she is more alert and following commands, ate 50% of her breakfast, she eats better at home

## 2019-08-29 NOTE — PROGRESS NOTES
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  No lytic or blastic lesions are seen  Soft tissues are unremarkable  · Ortho gave steroid intraarticular joint injection yesterday, she is still complaining of pain  · Discussed with family regarding completion during inpatient stay- given yesterday  · Continue lidoderm patch and tylenol standing  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  Assessment & Plan  · Patient has a recent closed fracture of the right clavicle  · ortho followed   · Received injection yesterday     Polypharmacy  Assessment & Plan  · Tramadol d/c due to patient population and potential for increased confusion  · Will follow up with outpatient neurologist for titration of current med list for Parkisons     Urinary tract infection  Assessment & Plan  · POA: UA positive for infection; IV rocephin started  · Urine cx pending  · Spoke with lab for preliminary report; slow growing reincubated will need to follow up tomorrow  · No fever or elevated WBC     Depression with anxiety  Assessment & Plan  · Currently the patient is on multiple anti anxiety and depressive medications      · However with the patient's tendency for polypharmacy and its presentation of increased somnolence  · Pt family reports noticing a change since addition of Paxyl to regimen, on hold at this time    Toxic metabolic encephalopathy  Assessment & Plan  on top of Parkinsons dementia due to UTI/ Tramadol use evidenced by increasing confusion, increased somnolence ,family states that this is not baseline mental status treated with CT of head, discontinue Tramadol, Neuro consult and IV Rocephin for UTI       * Parkinson disease, symptomatic (HCC)  Assessment & Plan  · Increased confusion and worsening parkinson symtoms s/p fall  · Pt family (son and daughter in law) endorse pt baseline awake, alert, and has much clearer speech than present  · Pt A+OX3 on exam  · Pt drowsy and pt family states that this is not baseline mental status at the time of admission  · Today she is more alert and following commands, ate 50% of her breakfast, she eats better at home        VTE Pharmacologic Prophylaxis:   Pharmacologic: Enoxaparin (Lovenox)  Mechanical VTE Prophylaxis in Place: Yes    Patient Centered Rounds: I have performed bedside rounds with nursing staff today  Discussions with Specialists or Other Care Team Provider: neurology notes reviewed, geriatrics notes reviewed     Education and Discussions with Family / Patient: patients care giver at bedside, and called and discussed with son  Time Spent for Care: 45 minutes  More than 50% of total time spent on counseling and coordination of care as described above  Current Length of Stay: 1 day(s)    Current Patient Status: Inpatient   Certification Statement: The patient will continue to require additional inpatient hospital stay due to UTI, medications    Discharge Plan: tomorrow? If culture returns    Code Status: Level 3 - DNAR and DNI      Subjective:   Doing well today  She was alert and smiling  Participated in physical exam  No acute issues  Objective:     Vitals:   Temp (24hrs), Av 2 °F (36 8 °C), Min:98 1 °F (36 7 °C), Max:98 4 °F (36 9 °C)    Temp:  [98 1 °F (36 7 °C)-98 4 °F (36 9 °C)] 98 1 °F (36 7 °C)  HR:  [56-68] 56  Resp:  [16-20] 18  BP: (158-161)/(57-85) 158/85  SpO2:  [92 %-93 %] 92 %  Body mass index is 19 48 kg/m²       Input and Output Summary (last 24 hours): Intake/Output Summary (Last 24 hours) at 8/29/2019 1105  Last data filed at 8/29/2019 0600  Gross per 24 hour   Intake 826 67 ml   Output 356 ml   Net 470 67 ml       Physical Exam:     Physical Exam   Constitutional: She appears well-developed and well-nourished  HENT:   Head: Normocephalic and atraumatic  Right Ear: External ear normal    Left Ear: External ear normal    Nose: Nose normal    Mouth/Throat: Oropharynx is clear and moist    Eyes: Pupils are equal, round, and reactive to light  Conjunctivae and EOM are normal    Cardiovascular: Normal rate, regular rhythm, normal heart sounds and intact distal pulses  Exam reveals no gallop and no friction rub  No murmur heard  Pulmonary/Chest: Effort normal and breath sounds normal  No stridor  No respiratory distress  She has no wheezes  She has no rales  She exhibits no tenderness  Abdominal: Soft  Bowel sounds are normal  She exhibits no distension and no mass  There is no tenderness  There is no rebound and no guarding  No hernia  Neurological: She is alert  She displays normal reflexes  No cranial nerve deficit  Coordination normal    Skin: Skin is warm and dry  No rash noted  No erythema  No pallor  Nursing note and vitals reviewed  Additional Data:     Labs:    Results from last 7 days   Lab Units 08/28/19  0442   WBC Thousand/uL 7 62   HEMOGLOBIN g/dL 13 7   HEMATOCRIT % 41 7   PLATELETS Thousands/uL 204   NEUTROS PCT % 68   LYMPHS PCT % 21   MONOS PCT % 9   EOS PCT % 1     Results from last 7 days   Lab Units 08/28/19  0442   POTASSIUM mmol/L 4 2   CHLORIDE mmol/L 108   CO2 mmol/L 29   BUN mg/dL 15   CREATININE mg/dL 0 72   CALCIUM mg/dL 9 0   ALK PHOS U/L 94   ALT U/L 8*   AST U/L 35     Results from last 7 days   Lab Units 08/27/19  0123   INR  1 03       * I Have Reviewed All Lab Data Listed Above  * Additional Pertinent Lab Tests Reviewed:  Xavier 66 Admission Reviewed    Imaging:    Imaging Reports Reviewed Today Include:    Imaging Personally Reviewed by Myself Includes:      Recent Cultures (last 7 days):     Results from last 7 days   Lab Units 08/27/19  1351   URINE CULTURE  Culture too young- will reincubate       Last 24 Hours Medication List:     Current Facility-Administered Medications:  acetaminophen 975 mg Oral [facility] HUNTER Grider    carbidopa-levodopa 1 tablet Oral 5x Daily Scarlett Cm MD    cefTRIAXone 1,000 mg Intravenous Q24H Scarlett Cm MD Last Rate: 1,000 mg (08/29/19 0400)   cholecalciferol 400 Units Oral Daily Scarlett Cm MD    dextran 70-hypromellose 1 drop Both Eyes PRN HUNTER Molina    enoxaparin 40 mg Subcutaneous Daily Scarlett Cm MD    entacapone 200 mg Oral 5x Daily Scarlett Cm MD    lidocaine 1 patch Topical Daily HUNTER Schwartz    multivitamin-minerals 1 tablet Oral Daily Sacrlett Cm MD    ondansetron 4 mg Intravenous Q6H PRN Scarlett Cm MD    polyethylene glycol 17 g Oral Daily HUNTER Schwartz         Today, Patient Was Seen By: Kya Funes MD    ** Please Note: Dictation voice to text software may have been used in the creation of this document   **

## 2019-08-29 NOTE — ASSESSMENT & PLAN NOTE
· POA: UA positive for infection; IV rocephin started  · Urine cx pending  · Spoke with lab for preliminary report; slow growing reincubated will need to follow up tomorrow  · No fever or elevated WBC

## 2019-08-29 NOTE — PHYSICAL THERAPY NOTE
Physical Therapy Progress Note     08/29/19 1045   Pain Assessment   Pain Assessment No/denies pain   Restrictions/Precautions   Weight Bearing Precautions Per Order Yes   RUE Weight Bearing Per Order WBAT   Other Precautions Cognitive; Chair Alarm; Bed Alarm; Fall Risk;Hard of hearing   General   Chart Reviewed Yes   Family/Caregiver Present No   Cognition   Overall Cognitive Status Impaired   Arousal/Participation Alert   Orientation Level Oriented to person   Subjective   Subjective no complaint   Bed Mobility   Supine to Sit 2  Maximal assistance   Additional items Assist x 1; Increased time required;Verbal cues;LE management   Transfers   Sit to Stand 3  Moderate assistance   Additional items Assist x 2; Increased time required;Verbal cues   Stand to Sit 3  Moderate assistance   Additional items Assist x 2; Increased time required;Verbal cues   Stand pivot 3  Moderate assistance   Additional items Assist x 2; Increased time required; Impulsive;Verbal cues   Ambulation/Elevation   Gait pattern Not appropriate; Wide BIENVENIDO; Improper Weight shift;Decreased foot clearance; Inconsistent sunday; Short stride; Step to;Excessively slow  (crosses feet but can correct with cues)   Gait Assistance 2  Maximal assist   Additional items Assist x 2;Verbal cues; Tactile cues   Assistive Device Rolling walker   Distance 4'   Balance   Static Sitting Fair -   Dynamic Sitting Poor -   Static Standing Poor -   Dynamic Standing Poor -   Ambulatory Zero   Endurance Deficit   Endurance Deficit Yes   Endurance Deficit Description fatigue  weakness   Activity Tolerance   Activity Tolerance Patient tolerated treatment well   Nurse Made Aware yes   Exercises   TKR Sitting;10 reps;AAROM;AROM; Bilateral  (frequent instruction  occasional assist)   Balance training  edge of bed  leans to left, practice trunk rotation in sitting and leaning to right on forearm for a few minutes     Assessment   Prognosis Guarded   Problem List Decreased strength;Decreased endurance;Decreased mobility; Impaired balance;Decreased coordination;Decreased cognition; Impaired judgement;Decreased safety awareness; Impaired hearing   Assessment pt is alert, oriented only to self  follow commands but needs frequent re-instructoin  pt performed bed mobility, transfer and gait training  only able to amb a few steps using rw and max assist of 2  worked on sitting balance prior to standing due to lean to left while sitting  pt tolerated well, caregiver presnt for session and pleased  recommend return home, home PT  pt is close to baseline   Barriers to Discharge None   Goals   Patient Goals none offered   LTG Expiration Date 09/11/19   Treatment Day 1   Plan   Treatment/Interventions Functional transfer training;LE strengthening/ROM; Therapeutic exercise; Endurance training;Cognitive reorientation;Patient/family training;Equipment eval/education; Bed mobility;Gait training;Spoke to nursing   Progress Slow progress, cognitive deficits   PT Frequency   (3-5x/week)   Recommendation   Recommendation Home with family support;24 hour supervision/assist;Home PT   PT - OK to Discharge Yes     Jamie Lees, PT

## 2019-08-29 NOTE — ASSESSMENT & PLAN NOTE
on top of Parkinsons dementia due to UTI/ Tramadol use evidenced by increasing confusion, increased somnolence ,family states that this is not baseline mental status treated with CT of head, discontinue Tramadol, Neuro consult and IV Rocephin for UTI

## 2019-08-30 VITALS
HEART RATE: 52 BPM | HEIGHT: 64 IN | SYSTOLIC BLOOD PRESSURE: 136 MMHG | WEIGHT: 114.42 LBS | TEMPERATURE: 97.9 F | OXYGEN SATURATION: 97 % | DIASTOLIC BLOOD PRESSURE: 61 MMHG | BODY MASS INDEX: 19.53 KG/M2 | RESPIRATION RATE: 16 BRPM

## 2019-08-30 LAB
ANION GAP SERPL CALCULATED.3IONS-SCNC: 6 MMOL/L (ref 4–13)
BACTERIA UR CULT: ABNORMAL
BASOPHILS # BLD AUTO: 0.01 THOUSANDS/ΜL (ref 0–0.1)
BASOPHILS NFR BLD AUTO: 0 % (ref 0–1)
BUN SERPL-MCNC: 27 MG/DL (ref 5–25)
CALCIUM SERPL-MCNC: 8.8 MG/DL (ref 8.3–10.1)
CHLORIDE SERPL-SCNC: 105 MMOL/L (ref 100–108)
CO2 SERPL-SCNC: 28 MMOL/L (ref 21–32)
CREAT SERPL-MCNC: 0.71 MG/DL (ref 0.6–1.3)
EOSINOPHIL # BLD AUTO: 0 THOUSAND/ΜL (ref 0–0.61)
EOSINOPHIL NFR BLD AUTO: 0 % (ref 0–6)
ERYTHROCYTE [DISTWIDTH] IN BLOOD BY AUTOMATED COUNT: 13.6 % (ref 11.6–15.1)
GFR SERPL CREATININE-BSD FRML MDRD: 76 ML/MIN/1.73SQ M
GLUCOSE SERPL-MCNC: 121 MG/DL (ref 65–140)
HCT VFR BLD AUTO: 37.4 % (ref 34.8–46.1)
HGB BLD-MCNC: 12.4 G/DL (ref 11.5–15.4)
IMM GRANULOCYTES # BLD AUTO: 0.05 THOUSAND/UL (ref 0–0.2)
IMM GRANULOCYTES NFR BLD AUTO: 0 % (ref 0–2)
LYMPHOCYTES # BLD AUTO: 1.05 THOUSANDS/ΜL (ref 0.6–4.47)
LYMPHOCYTES NFR BLD AUTO: 8 % (ref 14–44)
MCH RBC QN AUTO: 31.2 PG (ref 26.8–34.3)
MCHC RBC AUTO-ENTMCNC: 33.2 G/DL (ref 31.4–37.4)
MCV RBC AUTO: 94 FL (ref 82–98)
MONOCYTES # BLD AUTO: 0.84 THOUSAND/ΜL (ref 0.17–1.22)
MONOCYTES NFR BLD AUTO: 7 % (ref 4–12)
NEUTROPHILS # BLD AUTO: 10.63 THOUSANDS/ΜL (ref 1.85–7.62)
NEUTS SEG NFR BLD AUTO: 85 % (ref 43–75)
NRBC BLD AUTO-RTO: 0 /100 WBCS
PLATELET # BLD AUTO: 194 THOUSANDS/UL (ref 149–390)
PMV BLD AUTO: 11.8 FL (ref 8.9–12.7)
POTASSIUM SERPL-SCNC: 3.8 MMOL/L (ref 3.5–5.3)
RBC # BLD AUTO: 3.97 MILLION/UL (ref 3.81–5.12)
SODIUM SERPL-SCNC: 139 MMOL/L (ref 136–145)
WBC # BLD AUTO: 12.58 THOUSAND/UL (ref 4.31–10.16)

## 2019-08-30 PROCEDURE — 85025 COMPLETE CBC W/AUTO DIFF WBC: CPT | Performed by: INTERNAL MEDICINE

## 2019-08-30 PROCEDURE — 99239 HOSP IP/OBS DSCHRG MGMT >30: CPT | Performed by: INTERNAL MEDICINE

## 2019-08-30 PROCEDURE — 99232 SBSQ HOSP IP/OBS MODERATE 35: CPT | Performed by: INTERNAL MEDICINE

## 2019-08-30 PROCEDURE — 80048 BASIC METABOLIC PNL TOTAL CA: CPT | Performed by: INTERNAL MEDICINE

## 2019-08-30 RX ORDER — PAROXETINE HYDROCHLORIDE 20 MG/1
20 TABLET, FILM COATED ORAL DAILY
Status: DISCONTINUED | OUTPATIENT
Start: 2019-08-30 | End: 2019-08-30 | Stop reason: HOSPADM

## 2019-08-30 RX ADMIN — ENTACAPONE 200 MG: 200 TABLET ORAL at 06:06

## 2019-08-30 RX ADMIN — ENOXAPARIN SODIUM 40 MG: 40 INJECTION SUBCUTANEOUS at 08:24

## 2019-08-30 RX ADMIN — CEFTRIAXONE 1000 MG: 1 INJECTION, POWDER, FOR SOLUTION INTRAMUSCULAR; INTRAVENOUS at 03:45

## 2019-08-30 RX ADMIN — CARBIDOPA AND LEVODOPA 1 TABLET: 50; 200 TABLET, EXTENDED RELEASE ORAL at 12:08

## 2019-08-30 RX ADMIN — PAROXETINE 20 MG: 20 TABLET, FILM COATED ORAL at 10:37

## 2019-08-30 RX ADMIN — ENTACAPONE 200 MG: 200 TABLET ORAL at 12:08

## 2019-08-30 RX ADMIN — Medication 400 UNITS: at 08:24

## 2019-08-30 RX ADMIN — LIDOCAINE 1 PATCH: 50 PATCH CUTANEOUS at 08:24

## 2019-08-30 RX ADMIN — ACETAMINOPHEN 975 MG: 325 TABLET ORAL at 06:06

## 2019-08-30 RX ADMIN — CARBIDOPA AND LEVODOPA 1 TABLET: 50; 200 TABLET, EXTENDED RELEASE ORAL at 06:06

## 2019-08-30 RX ADMIN — Medication 1 TABLET: at 08:24

## 2019-08-30 NOTE — PLAN OF CARE
Problem: Potential for Falls  Goal: Patient will remain free of falls  Description  INTERVENTIONS:  - Assess patient frequently for physical needs  -  Identify cognitive and physical deficits and behaviors that affect risk of falls    -  [place] fall precautions as indicated by assessment   - Educate patient/family on patient safety including physical limitations  - Instruct patient to call for assistance with activity based on assessment  - Modify environment to reduce risk of injury  - Consider OT/PT consult to assist with strengthening/mobility  Outcome: Progressing     Problem: Prexisting or High Potential for Compromised Skin Integrity  Goal: Skin integrity is maintained or improved  Description  INTERVENTIONS:  - Identify patients at risk for skin breakdown  - Assess and monitor skin integrity  - Assess and monitor nutrition and hydration status  - Monitor labs   - Assess for incontinence   - Turn and reposition patient  - Assist with mobility/ambulation  - Relieve pressure over bony prominences  - Avoid friction and shearing  - Provide appropriate hygiene as needed including keeping skin clean and dry  - Evaluate need for skin moisturizer/barrier cream  - Collaborate with interdisciplinary team   - Patient/family teaching  - Consider wound care consult   Outcome: Progressing     Problem: NEUROSENSORY - ADULT  Goal: Achieves stable or improved neurological status  Description  INTERVENTIONS  - Monitor and report changes in neurological status  - Monitor vital signs such as temperature, blood pressure, glucose, and any other labs ordered   - Initiate measures to prevent increased intracranial pressure  - Monitor for seizure activity and implement precautions if appropriate      Outcome: Progressing     Problem: MUSCULOSKELETAL - ADULT  Goal: Maintain or return mobility to safest level of function  Description  INTERVENTIONS:  - Assess patient's ability to carry out ADLs; assess patient's baseline for ADL function and identify physical deficits which impact ability to perform ADLs (bathing, care of mouth/teeth, toileting, grooming, dressing, etc )  - Assess/evaluate cause of self-care deficits   - Assess range of motion  - Assess patient's mobility  - Assess patient's need for assistive devices and provide as appropriate  - Encourage maximum independence but intervene and supervise when necessary  - Involve family in performance of ADLs  - Assess for home care needs following discharge   - Consider OT consult to assist with ADL evaluation and planning for discharge  - Provide patient education as appropriate  Outcome: Progressing     Problem: GENITOURINARY - ADULT  Goal: Absence of urinary retention  Description  INTERVENTIONS:  - Assess patients ability to void and empty bladder  - Monitor I/O  - Bladder scan as needed  - Discuss with physician/AP medications to alleviate retention as needed  - Discuss catheterization for long term situations as appropriate  Outcome: Progressing     Problem: METABOLIC, FLUID AND ELECTROLYTES - ADULT  Goal: Fluid balance maintained  Description  INTERVENTIONS:  - Monitor labs   - Monitor I/O and WT  - Instruct patient on fluid and nutrition as appropriate  - Assess for signs & symptoms of volume excess or deficit  Outcome: Progressing     Problem: SKIN/TISSUE INTEGRITY - ADULT  Goal: Skin integrity remains intact  Description  INTERVENTIONS  - Identify patients at risk for skin breakdown  - Assess and monitor skin integrity  - Assess and monitor nutrition and hydration status  - Monitor labs (i e  albumin)  - Assess for incontinence   - Turn and reposition patient  - Assist with mobility/ambulation  - Relieve pressure over bony prominences  - Avoid friction and shearing  - Provide appropriate hygiene as needed including keeping skin clean and dry  - Evaluate need for skin moisturizer/barrier cream  - Collaborate with interdisciplinary team (i e  Nutrition, Rehabilitation, etc )   - Patient/family teaching  Outcome: Progressing     Problem: Nutrition/Hydration-ADULT  Goal: Nutrient/Hydration intake appropriate for improving, restoring or maintaining nutritional needs  Description  Monitor and assess patient's nutrition/hydration status for malnutrition  Collaborate with interdisciplinary team and initiate plan and interventions as ordered  Monitor patient's weight and dietary intake as ordered or per policy  Utilize nutrition screening tool and intervene as necessary  Determine patient's food preferences and provide high-protein, high-caloric foods as appropriate       INTERVENTIONS:  - Monitor oral intake, urinary output, labs, and treatment plans  - Assess nutrition and hydration status and recommend course of action  - Evaluate amount of meals eaten  - Assist patient with eating if necessary   - Allow adequate time for meals  - Recommend/ encourage appropriate diets, oral nutritional supplements, and vitamin/mineral supplements  - Order, calculate, and assess calorie counts as needed  - Recommend, monitor, and adjust tube feedings and TPN/PPN based on assessed needs  - Assess need for intravenous fluids  - Provide specific nutrition/hydration education as appropriate  - Include patient/family/caregiver in decisions related to nutrition  Outcome: Progressing

## 2019-08-30 NOTE — PROGRESS NOTES
Progress Note - Geriatric Medicine   Leanna Hutchinson 80 y o  female MRN: [id number]  Unit/Bed#: [facility] 804-01 Encounter: [id number]      Assessment/Plan:    Toxic metabolic encephalopathy  -multifactorial including polypharmacy, UTI, and Parkinson's disease with on-off phenomenon as well as superimposed hospital delirium  -Tramadol d/c prior to admission due to encephalopathy and somnolence  -improved with d/c Tramadol, treatment of right shoulder pain with lidocaine patch and well as treatment of UTI  -overnight patient was restless, suspect steroid injection contributing  -consider restarting home Paxil to prevent withdrawal symptoms since encephalopathy continues to improve    UTI  -treatment completed with Rocephin    Right clavicle fracture with non-union  -due to fall, s/p steroid injection by Ortho  -encourages use of topical lidocaine patch instead of systemic pain medications due to increased risk of worsening encephalopathy     Parkinson's disease   -currently on Parcopa 25-100mg disintegrating tabs 1-2 times PRN off periods and Stevalo -357pd tabs one tab 5 times daily, Q4H  -continue outpatient follow-up with Dr Guevara Orozco Neurology    Polypharmacy  -patients caregiver reports that family prefers no medication changes due to difficulty maintaining balance on regimen  -no medication changes made this admission beside treatment of UTI which is completed  -medication list reviewed in Epic with patients caregiver at bedside, copy of medication list provided to primary team and attached as image to Epic chart for future reference  -doxylamine succinate relatively CI in geriatric population due to risk of delirium and falls, recommend caregivers discuss with patients PCP at d/c    Ambulatory dysfunction with fall  -multifactorial including polypharmacy, Parkinson's, UTI and frailty  -continue fall precautions    Coordination of Care  -I spent 50 minutes coordinating care of this patient with the primary team (WILDAIM), the patients caregiver at the bedside, case management, and contacting the patients PCP Dr [facility]a [facility]) [phone] at the request of the patients caretaker  I was unable to reach Dr Spain Mauri and left message that patient will be d/c today and would like to establish follow-up for patient as well as answer any questions regarding details of stay  I assisted the patients caregiver with ensuring that her correct medication list was attached to her record in epic and personally imported the photo attachment as well as reviewed it personally with the caretaker at the bedside to ensure that ours was accurate and reviewed the discharge medication list which was confirmed with SLIM prior to reviewing with caretaker  Patient takes Miralax which should be added to home medication list in chart, does not take glucosamine any longer, will ask primary team to remove from home medication list      Subjective:   Patient seen and examined at the chairside where she is sitting resting comfortably  Her caregiver is at her bedside and states that she was somewhat restless overnight but seems to be getting better as the morning goes on  Her caregiver is very anxious to get her home today  She was awaiting return of urine cultures which revealed coag negative staph which was likely contaminant per ID  Review of Systems   Reason unable to perform ROS: dementia, patient does not provide any complaints today, caretaker denies acute concerns except related to dc p(anning  Objective:     Vitals: Blood pressure 136/61, pulse (!) 52, temperature 97 9 °F (36 6 °C), temperature source Oral, resp  rate 16, height 5' 4" (1 626 m), weight 51 9 kg (114 lb 6 7 oz), SpO2 97 %  ,Body mass index is 19 64 kg/m²        Intake/Output Summary (Last 24 hours) at 8/30/2019 1023  Last data filed at 8/30/2019 0620  Gross per 24 hour   Intake 240 ml   Output    Net 240 ml     Current Medications: Reviewed    Physical Exam:   Physical Exam   Constitutional: She appears well-developed  Patient thin and frail appearing, poor dentition   HENT:   Head: Normocephalic and atraumatic  Mucous membranes tachy   Eyes: Conjunctivae are normal  No scleral icterus  Neck: No JVD present  No tracheal deviation present  Cardiovascular: Normal rate and regular rhythm  Pulmonary/Chest: Effort normal and breath sounds normal  No respiratory distress  Abdominal: Soft  There is no tenderness  Musculoskeletal: She exhibits no edema  Neurological:   Awake and alert, smiles, speech uncomprehensible   Skin: Skin is warm and dry  There is pallor  Psychiatric: She has a normal mood and affect  Nursing note and vitals reviewed  Invasive Devices     Peripheral Intravenous Line            Peripheral IV 08/28/19 Left Forearm 2 days              Lab, Imaging and other studies: I have personally reviewed pertinent reports

## 2019-08-30 NOTE — SOCIAL WORK
Cm reviewed patient during care coordination rounds with Dr Lori Martinez  Patient stable for discharge home today  Patient's family to transport (patient's son/EC Jesus not available to transport but patient's caregivers at bedside will make arrangements with patient's other son & DIL to transport)  [facility] aware of discharge and  following for RN/PT/OT/Home Aide/SW/Speech services  Cm left voicemail to confirm plan with Jesus and awaiting return call  Cm following

## 2019-09-01 ENCOUNTER — HOSPITAL ENCOUNTER (EMERGENCY)
Facility: HOSPITAL | Age: 84
Discharge: HOME | End: 2019-09-01
Attending: EMERGENCY MEDICINE | Admitting: EMERGENCY MEDICINE
Payer: MEDICARE

## 2019-09-01 ENCOUNTER — APPOINTMENT (EMERGENCY)
Dept: RADIOLOGY | Facility: HOSPITAL | Age: 84
End: 2019-09-01
Payer: MEDICARE

## 2019-09-01 VITALS
TEMPERATURE: 97 F | DIASTOLIC BLOOD PRESSURE: 97 MMHG | HEART RATE: 72 BPM | OXYGEN SATURATION: 100 % | RESPIRATION RATE: 101 BRPM | SYSTOLIC BLOOD PRESSURE: 180 MMHG

## 2019-09-01 DIAGNOSIS — S63.502A SPRAIN OF LEFT WRIST, INITIAL ENCOUNTER: Primary | ICD-10-CM
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
## 2019-09-01 LAB
ANION GAP SERPL CALCULATED.3IONS-SCNC: 5 MMOL/L (ref 4–13)
BACTERIA UR QL AUTO: ABNORMAL /HPF
BASOPHILS # BLD AUTO: 0.02 THOUSANDS/ΜL (ref 0–0.1)
BASOPHILS NFR BLD AUTO: 0 % (ref 0–1)
BILIRUB UR QL STRIP: NEGATIVE
BUN SERPL-MCNC: 24 MG/DL (ref 5–25)
CALCIUM SERPL-MCNC: 9 MG/DL (ref 8.3–10.1)
CHLORIDE SERPL-SCNC: 107 MMOL/L (ref 100–108)
CLARITY UR: ABNORMAL
CO2 SERPL-SCNC: 30 MMOL/L (ref 21–32)
COLOR UR: ABNORMAL
COLOR, POC: YELLOW
CREAT SERPL-MCNC: 0.59 MG/DL (ref 0.6–1.3)
EOSINOPHIL # BLD AUTO: 0.1 THOUSAND/ΜL (ref 0–0.61)
EOSINOPHIL NFR BLD AUTO: 2 % (ref 0–6)
ERYTHROCYTE [DISTWIDTH] IN BLOOD BY AUTOMATED COUNT: 14.1 % (ref 11.6–15.1)
GFR SERPL CREATININE-BSD FRML MDRD: 82 ML/MIN/1.73SQ M
GLUCOSE SERPL-MCNC: 108 MG/DL (ref 65–140)
GLUCOSE UR STRIP-MCNC: NEGATIVE MG/DL
HCT VFR BLD AUTO: 43.9 % (ref 34.8–46.1)
HGB BLD-MCNC: 14.4 G/DL (ref 11.5–15.4)
HGB UR QL STRIP.AUTO: NEGATIVE
HYALINE CASTS #/AREA URNS LPF: ABNORMAL /LPF
IMM GRANULOCYTES # BLD AUTO: 0.01 THOUSAND/UL (ref 0–0.2)
IMM GRANULOCYTES NFR BLD AUTO: 0 % (ref 0–2)
KETONES UR STRIP-MCNC: ABNORMAL MG/DL
LEUKOCYTE ESTERASE UR QL STRIP: NEGATIVE
LYMPHOCYTES # BLD AUTO: 1.32 THOUSANDS/ΜL (ref 0.6–4.47)
LYMPHOCYTES NFR BLD AUTO: 21 % (ref 14–44)
MAGNESIUM SERPL-MCNC: 2.5 MG/DL (ref 1.6–2.6)
MCH RBC QN AUTO: 31.7 PG (ref 26.8–34.3)
MCHC RBC AUTO-ENTMCNC: 32.8 G/DL (ref 31.4–37.4)
MCV RBC AUTO: 97 FL (ref 82–98)
MONOCYTES # BLD AUTO: 0.49 THOUSAND/ΜL (ref 0.17–1.22)
MONOCYTES NFR BLD AUTO: 8 % (ref 4–12)
NEUTROPHILS # BLD AUTO: 4.44 THOUSANDS/ΜL (ref 1.85–7.62)
NEUTS SEG NFR BLD AUTO: 69 % (ref 43–75)
NITRITE UR QL STRIP: NEGATIVE
NON-SQ EPI CELLS URNS QL MICRO: ABNORMAL /HPF
NRBC BLD AUTO-RTO: 0 /100 WBCS
PH UR STRIP.AUTO: 6 [PH] (ref 4.5–8)
PLATELET # BLD AUTO: 203 THOUSANDS/UL (ref 149–390)
PMV BLD AUTO: 11.5 FL (ref 8.9–12.7)
POTASSIUM SERPL-SCNC: 4.5 MMOL/L (ref 3.5–5.3)
PROT UR STRIP-MCNC: ABNORMAL MG/DL
RBC # BLD AUTO: 4.54 MILLION/UL (ref 3.81–5.12)
RBC #/AREA URNS AUTO: ABNORMAL /HPF
SODIUM SERPL-SCNC: 142 MMOL/L (ref 136–145)
SP GR UR STRIP.AUTO: 1.02 (ref 1–1.03)
TROPONIN I SERPL-MCNC: <0.02 NG/ML
UROBILINOGEN UR QL STRIP.AUTO: 0.2 E.U./DL
WBC # BLD AUTO: 6.38 THOUSAND/UL (ref 4.31–10.16)
WBC #/AREA URNS AUTO: ABNORMAL /HPF

## 2019-09-01 PROCEDURE — 99284 EMERGENCY DEPT VISIT MOD MDM: CPT

## 2019-09-01 PROCEDURE — 81001 URINALYSIS AUTO W/SCOPE: CPT

## 2019-09-01 PROCEDURE — 93005 ELECTROCARDIOGRAM TRACING: CPT

## 2019-09-01 PROCEDURE — 70450 CT HEAD/BRAIN W/O DYE: CPT

## 2019-09-01 PROCEDURE — 72125 CT NECK SPINE W/O DYE: CPT

## 2019-09-01 PROCEDURE — 36415 COLL VENOUS BLD VENIPUNCTURE: CPT | Performed by: EMERGENCY MEDICINE

## 2019-09-01 PROCEDURE — 85025 COMPLETE CBC W/AUTO DIFF WBC: CPT | Performed by: EMERGENCY MEDICINE

## 2019-09-01 PROCEDURE — 71046 X-RAY EXAM CHEST 2 VIEWS: CPT

## 2019-09-01 PROCEDURE — 83735 ASSAY OF MAGNESIUM: CPT | Performed by: EMERGENCY MEDICINE

## 2019-09-01 PROCEDURE — 73110 X-RAY EXAM OF WRIST: CPT

## 2019-09-01 PROCEDURE — 80048 BASIC METABOLIC PNL TOTAL CA: CPT | Performed by: EMERGENCY MEDICINE

## 2019-09-01 PROCEDURE — 99285 EMERGENCY DEPT VISIT HI MDM: CPT | Performed by: EMERGENCY MEDICINE

## 2019-09-01 PROCEDURE — 84484 ASSAY OF TROPONIN QUANT: CPT | Performed by: EMERGENCY MEDICINE

## 2019-09-01 NOTE — ED ATTENDING ATTESTATION
Annia Taylor MD, saw and evaluated the patient  I have discussed the patient with the resident/non-physician practitioner and agree with the resident's/non-physician practitioner's findings, Plan of Care, and MDM as documented in the resident's/non-physician practitioner's note, except where noted  All available labs and Radiology studies were reviewed  I was present for key portions of any procedure(s) performed by the resident/non-physician practitioner and I was immediately available to provide assistance  At this point I agree with the current assessment done in the Emergency Department  I have conducted an independent evaluation of this patient a history and physical is as follows:    OA: 79 y/o f with multiple medical problems currently on abx for UTI who presents to the ED with concern for L wrist pain s/p fall  Per current caregiver, the patient fell 2 days ago and has been c/o wrist pain since  Of note, the pt has baseline dementia and herself is a poor historian but does follow some simple commands  She also has a history of parkinson's and baseline tremor  No report of cp/sob/n/v/fevers/chills  Pt does appear more fatigued than normal per caregiver  PE, elderly f, calm and cooperative, + mild abrasion to R forehead, healing, unclear if sustained in the fall, PERRL, no hemotympanum, neck supple/FROM/-palpable ttp, RR, lungs CTAB, abd soft, +BS, MOONEY, + mild grimace when L dorsal wrist is palpated, mild ecchymosis, no swelling, FROM fingers, intact pulses, able to fully range b/l LE   A/p eval for trauma, CT head, neck, CXR, L wrist imaging, basic blood work, EKG, urinalysis, treat and dispo accordingly    Critical Care Time  Procedures

## 2019-09-02 LAB
ATRIAL RATE: 52 BPM
P AXIS: 42 DEGREES
PR INTERVAL: 136 MS
QRS AXIS: 33 DEGREES
QRSD INTERVAL: 150 MS
QT INTERVAL: 480 MS
QTC INTERVAL: 446 MS
T WAVE AXIS: 38 DEGREES
VENTRICULAR RATE: 52 BPM

## 2019-09-02 PROCEDURE — 93010 ELECTROCARDIOGRAM REPORT: CPT | Performed by: INTERNAL MEDICINE

## 2019-09-03 ENCOUNTER — EPISODE CHANGES (OUTPATIENT)
Dept: CASE MANAGEMENT | Facility: HOSPITAL | Age: 84
End: 2019-09-03

## 2019-09-03 LAB
ATRIAL RATE: 74 BPM
QRS AXIS: 35 DEGREES
QRSD INTERVAL: 140 MS
QT INTERVAL: 450 MS
QTC INTERVAL: 450 MS
T WAVE AXIS: 53 DEGREES
VENTRICULAR RATE: 60 BPM

## 2019-09-03 PROCEDURE — 93010 ELECTROCARDIOGRAM REPORT: CPT | Performed by: INTERNAL MEDICINE

## 2019-09-03 NOTE — ED PROVIDER NOTES
History  Chief Complaint   Patient presents with    Wrist Injury     patient fell at home on friday  c/o left wrist pain  Patient is a 79-year-old female with a history of Parkinson's dementia presents with left wrist pain following a fall  Patient lives at home with home nursing care  She had did not witness fall from bed on Friday  Patient was initially was thought to have no injuries by family, so she was not brought in for evaluation  However, family noted that she was favoring her left wrist and had an ecchymosis to the area  Patient has a chronically waxing and waning level of alertness, but has been sleepier than average according to son  No associated fevers/chills, vomiting  Patient is unable to provide further history due to dementia          Prior to Admission Medications   Prescriptions Last Dose Informant Patient Reported? Taking? Acetaminophen 325 MG CAPS  Spouse/Significant Other Yes No   Sig: Take by mouth as needed    Cholecalciferol (VITAMIN D PO)  Spouse/Significant Other Yes No   Sig: Take 1 tablet by mouth daily     Cyanocobalamin (VITAMIN B-12 PO)  Spouse/Significant Other Yes No   Sig: Take by mouth  Melatonin-Pyridoxine (MELATIN PO)   Yes No   Sig: Take 1 tablet by mouth daily at bedtime   Multiple Vitamins-Minerals (CENTRUM SILVER ADULT 50+ PO)  Spouse/Significant Other Yes No   Sig: Take by mouth daily     PARoxetine (PAXIL) 20 mg tablet   Yes No   Sig: Take 20 mg by mouth daily   carbidopa-levodopa (PARCOPA)  mg per disintegrating tablet   No No   Si- 2 times prn off periods   carbidopa-levodopa-entacapone (STALEVO) -200 MG per tablet   No No   Sig: Take 1tab 5 times a day (every 4 hours)   docusate sodium (COLACE) 100 mg capsule   Yes No   Sig: Take 100 mg by mouth daily   glucosamine 500 MG CAPS capsule  Spouse/Significant Other Yes No   Sig: Take 1,000 mg by mouth daily     mirtazapine (REMERON) 15 mg tablet   No No   Sig: Take 1 tablet by mouth daily at bedtime for 30 days   polyethylene glycol (MIRALAX) 17 g packet  Child Yes No   Sig: Take 17 g by mouth every evening      Facility-Administered Medications: None       Past Medical History:   Diagnosis Date    Anxiety     Dementia     Depression     Gastric ulcer     H/O:  section     HLD (hyperlipidemia) 2016    Hypercholesteremia     Parkinson's disease ([facility] [address] )     Raynaud's disease        Past Surgical History:   Procedure Laterality Date     SECTION      CHOLECYSTECTOMY      ESOPHAGOGASTRODUODENOSCOPY      KNEE ARTHROSCOPY Left     NH COLONOSCOPY FLX DX W/COLLJ SPEC WHEN PFRMD N/A 3/18/2016    Procedure: COLONOSCOPY;  Surgeon: Rohith Santizo MD;  [facility]; Service: Gastroenterology    NH ESOPHAGOGASTRODUODENOSCOPY TRANSORAL DIAGNOSTIC N/A 2016    Procedure: ESOPHAGOGASTRODUODENOSCOPY (EGD); Surgeon: Rohith Santizo MD;  [facility]; Service: Gastroenterology    NH OPEN RX FEMUR FX+INTRAMED CHARLEY Right 2017    Procedure: INSERTION NAIL IM FEMUR ANTEGRADE (TROCHANTERIC); Surgeon: Petey Giordano MD;  [facility];  Service: Orthopedics       History reviewed  No pertinent family history  I have reviewed and agree with the history as documented      Social History     Tobacco Use    Smoking status: Never Smoker    Smokeless tobacco: Never Used   Substance Use Topics    Alcohol use: Never     Alcohol/week: 1 0 standard drinks     Types: 1 Glasses of wine per week     Frequency: Never     Comment: social    Drug use: No        Review of Systems   Unable to perform ROS: Dementia       Physical Exam  ED Triage Vitals   Temperature Pulse Respirations Blood Pressure SpO2   19 1118 19 1110 19 1110 19 1110 19 1110   (!) 97 °F (36 1 °C) (!) 117 16 (!) 212/84 94 %      Temp src Heart Rate Source Patient Position - Orthostatic VS BP Location FiO2 (%)   -- 19 1346 19 1346 19 1346 --    Monitor Sitting Right arm       Pain Score       09/01/19 1110       5             Orthostatic Vital Signs  Vitals:    09/01/19 1110 09/01/19 1118 09/01/19 1346 09/01/19 1430   BP: (!) 212/84  (!) 190/81 (!) 180/97   Pulse: (!) 117 55 58 72   Patient Position - Orthostatic VS:   Sitting        Physical Exam   Constitutional: She appears well-developed and well-nourished  No distress  HENT:   Head: Normocephalic and atraumatic  Eyes: Pupils are equal, round, and reactive to light  EOM are normal    Neck: Normal range of motion  Neck supple  Cardiovascular: Normal rate, regular rhythm and normal heart sounds  Pulmonary/Chest: Effort normal and breath sounds normal  No respiratory distress  Abdominal: Soft  Bowel sounds are normal  There is no tenderness  Musculoskeletal: Normal range of motion  Left wrist: She exhibits tenderness and swelling  She exhibits no deformity  Neurological: She is alert  She is disoriented  She displays tremor  She exhibits abnormal muscle tone  Skin: Skin is warm and dry  Psychiatric: She has a normal mood and affect  Nursing note and vitals reviewed        ED Medications  Medications - No data to display    Diagnostic Studies  Results Reviewed     Procedure Component Value Units Date/Time    Troponin I [[id number]]  (Normal) Collected:  09/01/19 1508    Lab Status:  Final result Specimen:  Blood from Arm, Right Updated:  09/01/19 1541     Troponin I <0 02 ng/mL     Magnesium [[id number]]  (Normal) Collected:  09/01/19 1508    Lab Status:  Final result Specimen:  Blood from Hand, Right Updated:  09/01/19 1526     Magnesium 2 5 mg/dL     Urine Microscopic [[id number]]  (Abnormal) Collected:  09/01/19 1349    Lab Status:  Final result Specimen:  Urine, Other Updated:  09/01/19 1416     RBC, UA 2-4 /hpf      WBC, UA 2-4 /hpf      Epithelial Cells None Seen /hpf      Bacteria, UA None Seen /hpf      Hyaline Casts, UA None Seen /lpf     POCT urinalysis dipstick [[id number]]  (Normal) Resulted:  09/01/19 1352    Lab Status:  Final result Updated:  09/01/19 1352     Color, UA yellow    ED Urine Macroscopic [[id number]]  (Abnormal) Collected:  09/01/19 1349    Lab Status:  Final result Specimen:  Urine Updated:  09/01/19 1348     Color, UA Orange     Clarity, UA Slightly Cloudy     pH, UA 6 0     Leukocytes, UA Negative     Nitrite, UA Negative     Protein, UA Trace mg/dl      Glucose, UA Negative mg/dl      Ketones, UA 15 (1+) mg/dl      Urobilinogen, UA 0 2 E U /dl      Bilirubin, UA Negative     Blood, UA Negative     Specific Gravity, UA 1 020    Narrative:       CLINITEK RESULT    Basic metabolic panel [[id number]]  (Abnormal) Collected:  09/01/19 1259    Lab Status:  Final result Specimen:  Blood from Arm, Left Updated:  09/01/19 1320     Sodium 142 mmol/L      Potassium 4 5 mmol/L      Chloride 107 mmol/L      CO2 30 mmol/L      ANION GAP 5 mmol/L      BUN 24 mg/dL      Creatinine 0 59 mg/dL      Glucose 108 mg/dL      Calcium 9 0 mg/dL      eGFR 82 ml/min/1 73sq m     Narrative:       [place] guidelines for Chronic Kidney Disease (CKD):     Stage 1 with normal or high GFR (GFR > 90 mL/min/1 73 square meters)    Stage 2 Mild CKD (GFR = 60-89 mL/min/1 73 square meters)    Stage 3A Moderate CKD (GFR = 45-59 mL/min/1 73 square meters)    Stage 3B Moderate CKD (GFR = 30-44 mL/min/1 73 square meters)    Stage 4 Severe CKD (GFR = 15-29 mL/min/1 73 square meters)    Stage 5 End Stage CKD (GFR <15 mL/min/1 73 square meters)  Note: GFR calculation is accurate only with a steady state creatinine    CBC and differential [[id number]] Collected:  09/01/19 1259    Lab Status:  Final result Specimen:  Blood from Arm, Left Updated:  09/01/19 1307     WBC 6 38 Thousand/uL      RBC 4 54 Million/uL      Hemoglobin 14 4 g/dL      Hematocrit 43 9 %      MCV 97 fL      MCH 31 7 pg      MCHC 32 8 g/dL      RDW 14 1 %      MPV 11 5 fL      Platelets 726 Thousands/uL      nRBC 0 /100 WBCs      Neutrophils Relative 69 %      Immat GRANS % 0 %      Lymphocytes Relative 21 %      Monocytes Relative 8 %      Eosinophils Relative 2 %      Basophils Relative 0 %      Neutrophils Absolute 4 44 Thousands/µL      Immature Grans Absolute 0 01 Thousand/uL      Lymphocytes Absolute 1 32 Thousands/µL      Monocytes Absolute 0 49 Thousand/µL      Eosinophils Absolute 0 10 Thousand/µL      Basophils Absolute 0 02 Thousands/µL                  XR chest 2 views   ED Interpretation by Ramiro Garcia MD (09/01 1326)   No acute abnormality      Final Result by Crow Bocanegra MD (09/01 1426)      No acute cardiopulmonary disease  Workstation performed: [id number]         CT head without contrast   Final Result by Gregor Eisenmenger, MD (09/01 1324)      No acute intracranial abnormality  Microangiopathic changes  Workstation performed: [id number]         CT spine cervical without contrast   Final Result by Gregor Eisenmenger, MD (09/01 1327)      No cervical spine fracture or traumatic malalignment  Workstation performed: [id number]         XR wrist 3+ views LEFT   ED Interpretation by Ramiro Garcia MD (09/01 1327)   No acute abnormality      Final Result by Gregor Eisenmenger, MD (09/01 1409)      No acute osseous abnormality  Workstation performed: [id number]               Procedures  Procedures        ED Course           Identification of Seniors at Risk      Most Recent Value   (ISAR) Identification of Seniors at Risk   Before the illness or injury that brought you to the Emergency, did you need someone to help you on a regular basis? 1 Filed at: 09/01/2019 1110   In the last 24 hours, have you needed more help than usual?  1 Filed at: 09/01/2019 1110   Have you been hospitalized for one or more nights during the past 6 months? 0 Filed at: 09/01/2019 1110   In general, do you see well?   1 Filed at: 09/01/2019 1110   In general, do you have serious problems with your memory? 1 Filed at: 09/01/2019 1110   Do you take more than three different medications every day? 1 Filed at: 09/01/2019 1110   ISAR Score  5 Filed at: 09/01/2019 1110                          [facility]  Number of Diagnoses or Management Options  Fall from bed, initial encounter:   Sprain of left wrist, initial encounter:   Diagnosis management comments: Patient presents for evaluation following a fall on Friday  Noted to have mild pain with range of motion and tenderness of the left wrist   Workup for serious traumatic injury or underlying medical cause of fall is unremarkable  Patient noted to be at baseline mental status by family during emergency department stay  After discussion of option to admit for observation, son elects for patient to be discharged back into the care of home nursing  Return precautions given      Disposition  Final diagnoses:   Sprain of left wrist, initial encounter   Fall from bed, initial encounter     Time reflects when diagnosis was documented in both MDM as applicable and the Disposition within this note     Time User Action Codes Description Comment    9/1/2019  3:36 PM Caridad Dodge 46 Sprain of left wrist, initial encounter     9/1/2019  3:36 PM Sidra Dodge Add [W06  XXXA] Fall from bed, initial encounter       ED Disposition     ED Disposition Condition Date/Time Comment    Discharge Stable Sun Sep 1, 2019  3:35 PM William Houser discharge to home/self care  Follow-up Information     Follow up With Specialties Details Why Contact Info Additional Information    Staci Mosqueda MD Internal Medicine Call in 1 day  [address]   [address] [facility] Emergency Medicine  As needed, If symptoms worsen [address]  [phone]  ED, [address] [place] [address], [facility]          Discharge Medication List as of 9/1/2019  3:36 PM      CONTINUE these medications which have NOT CHANGED    Details   Acetaminophen 325 MG CAPS Take by mouth as needed , Historical Med      carbidopa-levodopa (PARCOPA)  mg per disintegrating tablet 1- 2 times prn off periods, Normal      carbidopa-levodopa-entacapone (STALEVO) -200 MG per tablet Take 1tab 5 times a day (every 4 hours), Normal      Cholecalciferol (VITAMIN D PO) Take 1 tablet by mouth daily  , Historical Med      Cyanocobalamin (VITAMIN B-12 PO) Take by mouth , Historical Med      docusate sodium (COLACE) 100 mg capsule Take 100 mg by mouth daily, Historical Med      glucosamine 500 MG CAPS capsule Take 1,000 mg by mouth daily  , Historical Med      Melatonin-Pyridoxine (MELATIN PO) Take 1 tablet by mouth daily at bedtime, Historical Med      mirtazapine (REMERON) 15 mg tablet Take 1 tablet by mouth daily at bedtime for 30 days, Starting Mon 3/6/2017, Until Tue 8/27/2019, Print      Multiple Vitamins-Minerals (CENTRUM SILVER ADULT 50+ PO) Take by mouth daily  , Historical Med      PARoxetine (PAXIL) 20 mg tablet Take 20 mg by mouth daily, Historical Med      polyethylene glycol (MIRALAX) 17 g packet Take 17 g by mouth every evening, Historical Med           No discharge procedures on file  ED Provider  Attending physically available and evaluated Raisa Vivian  PABLO managed the patient along with the ED Attending      Electronically Signed by         Jennyfer Grimes MD  09/10/19 2141

## 2019-09-04 ENCOUNTER — PATIENT OUTREACH (OUTPATIENT)
Dept: CASE MANAGEMENT | Facility: OTHER | Age: 84
End: 2019-09-04

## 2019-09-04 NOTE — PROGRESS NOTES
Chart review completed  Per WILDA JIMENEZ RN's note, written on 9/1/2019:    UPON ARRIVAL TO PTS HOME SON AND CG PRESENT  REVIEWED DC PAPERWORK WITH CG IN WHICH SHE STATED NO CHANGES MADE TO MED MGMT OR REGIME  CG IND WITH NO ISSUES  CG STATED THAT DUE TO HOSPITALIZATION PT IS VERY WEAK AND DISORIENTED MORE THAN USUAL  STATED THAT SHE WOULD LIKE PHYSICAL THERAPY TO EVALUATE PT AS SOON AS POSSIBLE TO SEE IF ANYTHING CAN BE DONE TO ASSIST PT IN RETURNING TO PRIOR ACTIVITY LEVEL  AT THIS TIME SON AND CG DECLINING NEED FOR SN SERVICES HOWEVER WOULD STILL LIKE THERAPY SERVICES TO COME TO HOME FOR AN EVALUATION  PRIOR TO SN LEAVING CG DID STATE THAT PT FELL OUT OF BED LAST NIGHT AND SEEMS TO BE HOLDING HER LEFT ARM AND HAND VERY STILL AND CLOSE TO HER BODY  UPON SN VISUAL ASSESSMENT LEFT WRIST SWOLLEN WITH BRUISING  PT DID NOT COMPLAIN OF ANY PAIN HOWEVER CG STATED SHE HAS A VERY HIGH PAIN TOLERANCE  SN SUGGESTED ER EVALUATION AS VISUAL EVALUATION IS UNABLE TO DIAGNOSE SPRAIN VS FX  CG AGREEABLE AND CALLED 911  CM  CC  SS  M D  THERAPY SERVICES MADE AWARE  Brendon Dior, RN     Patient was not admitted for skilled nursing services, however, she is scheduled for a physical therapy evaluation today and has an occupational therapy and  evaluation pending

## 2019-09-05 ENCOUNTER — PATIENT OUTREACH (OUTPATIENT)
Dept: CASE MANAGEMENT | Facility: HOSPITAL | Age: 84
End: 2019-09-05

## 2019-09-05 NOTE — PROGRESS NOTES
Spoke with patient son Chyna Martinez  He declines to participate in program at this time  He hired a  doctor who recommended [employer] for his mom   He feels everything is good right now

## 2019-09-09 NOTE — DISCHARGE SUMMARY
Discharge Summary - Keagan Singh 80 y o  female MRN: [id number]    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, subsequent encounter [S42 031K]    HPI:  Keagan Singh is a 80 y o  female who has a past medical history significant for Parkinson disease and takes parkinsonian medications 5 times daily  She also has a history of dementia as well as depression with anxiety  Noted is that medication list is full of medications better able to induce somnolence  That said, she recently had a right clavicular fracture resulting from a fall  She is due to be seen by Orthopedics  But meanwhile, the patient was placed on Ultram by her primary physician who visits her at home  The patient has been sleeping it seems for the past 24 hours  She has not eaten or taken any of her medications  She was seen to be last awake approximately 1 in the morning on Sunday and she was still sleeping as of past midnight  Her primary care physician who does house visits went to her place and according to the caretaker who is with her the assessment was possibility of polypharmacy affecting her wakefulness especially with the use of Ultram as part of recent medications  Currently the patient is alert and awake however she is not communicative  She shaking and regards at the examiner but would not follow commands  There are times that she would attempt to do so but is quite unsuccessful  According to the caretaker these are symptoms that are reminiscent of worsening parkinsonian symptoms should she not take her medication    That said, the patient is placed in hospital in order for her to get her Parkinson's meds and at least get Neurology to see her  Also, she was found to have foul smelling urine and a questionable cystitis  She started on Rocephin      Meanwhile, she is supposed to see her orthopedic surgeon this afternoon ([facility] however caretaker does not know the name)  Will put a referral in  She has mild pain upon extension of her arm especially forced on the right side " noted from Dr Celena Courtney admission noted  Procedures Performed:   Orders Placed This Encounter   Procedures    ED ECG Documentation Only       Summary of Hospital Course: Due to fall prior to admission, she was complaining of right shoulder pain in ED  No acute fracture or dislocations, degenerative artheritis noted at acromioclavicular joint  No lytic and blastic lesions noted  No swelling and tenderness on exam  Completed streroid injection, lidoderm and tylenol was given for pain  Tremadol was discontinued  Presented with Toxic metabolic encephalopathy in the setting of polypharmacy, UTI, and parkinson's disease possible factors prompted her status  She was on Tramadol possible in her case was considered as primary cause of her delirium and somnolence  Improved after discontinuation of the treatment, she was placed instead lidocaine patch, steroid injection by orthopedics and treated for UTI  Patient was on Paxil, which was restarted to prevent withdrawal symptoms  She was on rocephin, which she completed  She was having right clavicle fracture with non-union due to fall s/p steroid injection by Orthopedics  She has anxiety and depression as home treatment  She improved over the stay  Care taker requirement to contact primary doctor, discussed with son on the phone  Geriatrics updated family doctor  She was stable for discharge and private and care takers available to care for the patient       Significant Findings, Care, Treatment and Services Provided: as above    Complications: none    Discharge Diagnosis: toxic metabolic encephalopathy    Resolved Problems  Date Reviewed: 8/29/2019    None          Condition at Discharge: stable         Discharge instructions/Information to patient and family:   See after visit summary for information provided to patient and family  Provisions for Follow-Up Care:  See after visit summary for information related to follow-up care and any pertinent home health orders  PCP: Prudencio Dang MD    Disposition: Home    Planned Readmission: No    Discharge Statement   I spent 35 minutes discharging the patient  This time was spent on the day of discharge  I had direct contact with the patient on the day of discharge  Additional documentation is required if more than 30 minutes were spent on discharge  Discharge Medications:  See after visit summary for reconciled discharge medications provided to patient and family

## 2019-10-02 ENCOUNTER — TELEPHONE (OUTPATIENT)
Dept: NEUROLOGY | Facility: CLINIC | Age: 84
End: 2019-10-02

## 2019-10-02 NOTE — TELEPHONE ENCOUNTER
Received call from Dr Artemio Posadas pt's PCP who asked for a return call at your convenience  Reports that pt has declinced considerably, she is being placed on hospice and she needs your advise regarding her medications      cell [phone]

## 2019-10-03 NOTE — TELEPHONE ENCOUNTER
Spoke with Dr Kavon Xiong  Now on hospice  She does home visit  Stalevo switched to Parcopa 1 tab q 3 hours   When taking 2 tab she was "restless"  She is sleeping much of the day  One time slept 20 hours straight  Only on Paxil and Parcopa  Having leg movements at night  Wondering if ropinirole is a good option  Suggested trying gabapentin for PLS

## 2019-11-27 ENCOUNTER — PATIENT OUTREACH (OUTPATIENT)
Dept: CASE MANAGEMENT | Facility: HOSPITAL | Age: 84
End: 2019-11-27

## (undated) DEVICE — 6617 IOBAN II PATIENT ISOLATION DRAPE 5/BX,4BX/CS: Brand: STERI-DRAPE™ IOBAN™ 2

## (undated) DEVICE — 3M™ TEGADERM™ TRANSPARENT FILM DRESSING FRAME STYLE, 1627, 4 IN X 10 IN (10 CM X 25 CM), 20/CT 4CT/CASE: Brand: 3M™ TEGADERM™

## (undated) DEVICE — DRAPE C-ARMOUR

## (undated) DEVICE — 2.5MM REAMING ROD WITH BALL TIP/950MM-STERILE

## (undated) DEVICE — ABDOMINAL PAD: Brand: DERMACEA

## (undated) DEVICE — BETADINE SURGICAL SCRUB 32OZ

## (undated) DEVICE — 3M™ TEGADERM™ TRANSPARENT FILM DRESSING FRAME STYLE, 1626W, 4 IN X 4-3/4 IN (10 CM X 12 CM), 50/CT 4CT/CASE: Brand: 3M™ TEGADERM™

## (undated) DEVICE — 5.0MM TI LOCKING SCREW W/T25 STARDRIVE 36MM F/IM NAIL-STER: Type: IMPLANTABLE DEVICE | Site: HIP | Status: NON-FUNCTIONAL

## (undated) DEVICE — DRAPE SURGIKIT SADDLE BAG

## (undated) DEVICE — SILVER-COATED ANTIMICROBIAL BARRIER DRESSING: Brand: ACTICOAT   4" X 8"

## (undated) DEVICE — INTENDED FOR TISSUE SEPARATION, AND OTHER PROCEDURES THAT REQUIRE A SHARP SURGICAL BLADE TO PUNCTURE OR CUT.: Brand: BARD-PARKER SAFETY BLADES SIZE 15, STERILE

## (undated) DEVICE — DRAPE SHEET THREE QUARTER

## (undated) DEVICE — GLOVE INDICATOR PI UNDERGLOVE SZ 8.5 BLUE

## (undated) DEVICE — PAD CAST 4 IN COTTON NON STERILE

## (undated) DEVICE — SUT VICRYL PLUS 1 CTB-1 36 IN VCPB947H

## (undated) DEVICE — 4.0MM THREE-FLUTED DRILL BIT QC/260MM/65MM CALIBRATION

## (undated) DEVICE — CHLORAPREP HI-LITE 26ML ORANGE

## (undated) DEVICE — STERILE ORIF HIP PACK: Brand: CARDINAL HEALTH

## (undated) DEVICE — MEDI-VAC YANKAUER SUCTION HANDLE W/STRAIGHT TIP & CONTROL VENT: Brand: CARDINAL HEALTH

## (undated) DEVICE — 3M™ IOBAN™ 2 ANTIMICROBIAL INCISE DRAPE 6650EZ: Brand: IOBAN™ 2

## (undated) DEVICE — ARTHROSCOPY FLOOR MAT

## (undated) DEVICE — SUT VICRYL PLUS 2-0 CTB-1 27 IN VCPB259H

## (undated) DEVICE — SPONGE PVP SCRUB WING STERILE

## (undated) DEVICE — HEAVY DUTY TABLE COVER: Brand: CONVERTORS

## (undated) DEVICE — INTENDED FOR TISSUE SEPARATION, AND OTHER PROCEDURES THAT REQUIRE A SHARP SURGICAL BLADE TO PUNCTURE OR CUT.: Brand: BARD-PARKER SAFETY BLADES SIZE 10, STERILE

## (undated) DEVICE — GLOVE SRG BIOGEL 8

## (undated) DEVICE — 3.2MM GUIDE WIRE 400MM